# Patient Record
Sex: MALE | Race: BLACK OR AFRICAN AMERICAN | NOT HISPANIC OR LATINO | Employment: FULL TIME | URBAN - METROPOLITAN AREA
[De-identification: names, ages, dates, MRNs, and addresses within clinical notes are randomized per-mention and may not be internally consistent; named-entity substitution may affect disease eponyms.]

---

## 2017-04-06 ENCOUNTER — TELEPHONE (OUTPATIENT)
Dept: ORTHOPEDIC SURGERY | Facility: CLINIC | Age: 32
End: 2017-04-06

## 2017-04-06 NOTE — TELEPHONE ENCOUNTER
----- Message from Honey Hicks sent at 4/4/2017  4:53 PM CDT -----  Contact: self  Patient called regarding injury. Didn't get into the specifics but has nevriky been seen. Please contact [phone] (home)

## 2017-04-06 NOTE — TELEPHONE ENCOUNTER
Mr. Shelley was calling because he is concerned about his wife. She is crying in pain and he wanted to know if there is anything else Isabelle Tobin can do for her. I let him know other than the anti-inflammatory, muscle relaxer, physical therapy, appointment with Dr. Peacock and the surgeon she has nothing more to offer. The patient is going for her physical therapy evaluation today.

## 2017-05-16 ENCOUNTER — NURSE TRIAGE (OUTPATIENT)
Dept: ADMINISTRATIVE | Facility: CLINIC | Age: 32
End: 2017-05-16

## 2017-05-16 ENCOUNTER — TELEPHONE (OUTPATIENT)
Dept: FAMILY MEDICINE | Facility: CLINIC | Age: 32
End: 2017-05-16

## 2017-05-16 NOTE — TELEPHONE ENCOUNTER
----- Message from Cesia Mckay sent at 5/16/2017  1:32 PM CDT -----  Contact: Patient  Patient's right middle finger is very swollen. Patient cannot close finger. Needs to be seen today. Please call patient at [phone].

## 2017-05-16 NOTE — TELEPHONE ENCOUNTER
Tried to reach pt. No answer. No voicemail. Unable to leave msg. Will have to try later, or if pt calls back, will recommend UC clinic or offer first available when he calls back.

## 2017-05-16 NOTE — TELEPHONE ENCOUNTER
"    Reason for Disposition   [1] Looks infected (spreading redness, red streak, pus) AND [2] severe pain with movement    Answer Assessment - Initial Assessment Questions  1. ONSET: "When did the pain start?"       4-5 days.    2. LOCATION and RADIATION: "Where is the pain located?"  (e.g., fingertip, around nail, joint, entire   finger)       All around the nail is hurting, and the entire middle finger of my right hand is swollen.    3. SEVERITY: "How bad is the pain?" "What does it keep you from doing?"   (Scale 1-10; or mild, moderate, severe)   - MILD - doesn't interfere with normal activities    - MODERATE - interferes with normal activities or awakens from sleep   - SEVERE - excruciating pain, unable to hold a glass of water or bend finger even a little      I can't write with this hand.    4. APPEARANCE: "What does the finger look like?" (e.g., redness, swelling, bruising, pallor)      Red, swollen at least twice as large as the other hand's middle finger.    5. WORK OR EXERCISE: "Has there been any recent work or exercise that involved this part of the body?"        5. CAUSE: "What do you think is causing the pain?"      I think it is an ingrown nail.  Because I bite my nails.    6. AGGRAVATING FACTORS: "What makes the pain worse?" (e.g., using computer)      No, it is constant pain.  10 of 10 pain.    7. OTHER SYMPTOMS: "Do you have any other symptoms?" (e.g., fever, neck pain, numbness)      My finger is numb in certain areas, but no fever.    8. PREGNANCY: "Is there any chance you are pregnant?" "When was your last menstrual period?"      n/a    Protocols used:  FINGER PAIN-A-    Recommended ED now for Kurt, who called with red, swollen, and severely painful (10 of 10) middle finger of right hand.  He states has been getting worse over the last 4-5 days, and states it is probably due to an ingrown fingernail.  Finger is twice as large as left hand middle finger, and he states he can't hold a pen or " write with the right hand.  Message to Wili Molina MD .  Please contact caller directly with any additional care advice.

## 2017-05-17 NOTE — TELEPHONE ENCOUNTER
Pt spoke with Sole Elias RN yesterday regarding his swollen finger ans he advised pt to go to ED.

## 2017-05-18 ENCOUNTER — TELEPHONE (OUTPATIENT)
Dept: FAMILY MEDICINE | Facility: CLINIC | Age: 32
End: 2017-05-18

## 2017-05-18 NOTE — TELEPHONE ENCOUNTER
----- Message from Artie Donahue sent at 5/18/2017  3:47 PM CDT -----  Contact: self   Patient need orders for bloodwork, any questions please call back at, [phone] (home)

## 2017-05-18 NOTE — TELEPHONE ENCOUNTER
Spoke with pt and he is asking to get lab orders for his annual that he has scheduled 06/05/2017 with Dr. Molina.     Also pt is on the schedule for 05/19/2017 due to chest pains ( not active); he said that he does not c/o SOB or any left arm numbness that it comes and goes and he has been under some stress and seems to think it may be anxiety related.

## 2017-05-19 NOTE — TELEPHONE ENCOUNTER
Lm for pt to return call to inform him that labs will be ordered on his appt that is scheduled for 05/19/2017.

## 2017-05-19 NOTE — TELEPHONE ENCOUNTER
----- Message from Patrica Rocco sent at 5/19/2017  3:25 PM CDT -----  Returning your call.  Please call [phone].

## 2017-05-22 ENCOUNTER — PATIENT OUTREACH (OUTPATIENT)
Dept: ADMINISTRATIVE | Facility: HOSPITAL | Age: 32
End: 2017-05-22

## 2017-06-23 ENCOUNTER — NURSE TRIAGE (OUTPATIENT)
Dept: ADMINISTRATIVE | Facility: CLINIC | Age: 32
End: 2017-06-23

## 2017-06-23 ENCOUNTER — OFFICE VISIT (OUTPATIENT)
Dept: FAMILY MEDICINE | Facility: CLINIC | Age: 32
End: 2017-06-23
Payer: COMMERCIAL

## 2017-06-23 VITALS
TEMPERATURE: 98 F | HEIGHT: 74 IN | BODY MASS INDEX: 23.68 KG/M2 | DIASTOLIC BLOOD PRESSURE: 82 MMHG | WEIGHT: 184.5 LBS | SYSTOLIC BLOOD PRESSURE: 130 MMHG | HEART RATE: 64 BPM

## 2017-06-23 DIAGNOSIS — R07.9 CHEST PAIN, UNSPECIFIED TYPE: Primary | ICD-10-CM

## 2017-06-23 DIAGNOSIS — M51.37 DDD (DEGENERATIVE DISC DISEASE), LUMBOSACRAL: ICD-10-CM

## 2017-06-23 DIAGNOSIS — R20.2 TINGLING SENSATION: ICD-10-CM

## 2017-06-23 DIAGNOSIS — I24.9 ACS (ACUTE CORONARY SYNDROME): ICD-10-CM

## 2017-06-23 DIAGNOSIS — R94.31 ABNORMAL EKG: ICD-10-CM

## 2017-06-23 PROCEDURE — 99215 OFFICE O/P EST HI 40 MIN: CPT | Mod: S$GLB,,, | Performed by: INTERNAL MEDICINE

## 2017-06-23 PROCEDURE — 93005 ELECTROCARDIOGRAM TRACING: CPT | Mod: S$GLB,,, | Performed by: INTERNAL MEDICINE

## 2017-06-23 PROCEDURE — 99999 PR PBB SHADOW E&M-EST. PATIENT-LVL III: CPT | Mod: PBBFAC,,, | Performed by: INTERNAL MEDICINE

## 2017-06-23 PROCEDURE — 93010 ELECTROCARDIOGRAM REPORT: CPT | Mod: S$GLB,,, | Performed by: INTERNAL MEDICINE

## 2017-06-23 NOTE — TELEPHONE ENCOUNTER
Spoke with pt, he states that he has been having this and he doesn't feel he needs to go to the ED for this.  I did explain to him that if anything happens we are not responsible as he did not seek care in the ED when directed and he accepted full responsibility.  He was booked same day with [facility] at 340pm.

## 2017-06-23 NOTE — TELEPHONE ENCOUNTER
Reason for Disposition   Recent long-distance travel with prolonged time in car, bus, plane, or train (i.e., within past 2 weeks; 6 or more hours duration)    Protocols used: ST CHEST PAIN-A-[place]    Pt c/o left sided sharp, intermittent chest pain and left foot tingling. Care advice given. Pt does not want to go to the ER, states he wants appt with PCP. Please call for further care advice.

## 2017-06-23 NOTE — PROGRESS NOTES
Subjective:       Patient ID: Kurt Shelley is a 31 y.o. male.    Chief Complaint: Chest Pain    HPI  32 yo male seen for Dr Molina, for evaluation of L lower thoracic chest pain.  Had some chest pain earlier here in the office before being seen cleared by the time he was evaluated.  She was no known history of any heart problems has reportedly had chest pain involving left lower thoracic area which started about 4 weeks ago it waxes and wanes sore at times is unable to state how long it lasts occurs about 1-2 times a day.  He has no radiation of the chest pain.  No nausea or vomiting associated with it or shortness of breath or diaphoresis.  He does however get sweats at night occasionally which as been occurring for a few months now he denies any drug abuse caffeine intake is one cup of coffee or tea during the day.  In EKG at age 19 to join in [employer] was performed patient reportedly failed due to abnormal heart as he describes it.  [facility] due to his service excess stress test at about age 19 he reportedly did fine he suspects.  Chest pain occurs at rest and with actinic her at walking as well.  He works out regularly he's in good shape he lifts weights and runs 5 miles at a time about 2-3 times per week  Family medical history is negative for coronary disease his mom does have hypertension and has had a CVA.  His brother  of congestive heart failure at 23 he was obese with obstructive sleep apnea.  He also wants to have tingling in his left foot evaluated which he's had for about one week.  He doesn't smoke or have a history of elevated fasting blood sugar.  Has been constant mostly in character.  He has no lower back pain or gluteal pain.  He has a history of bulging disc involving his lower back.from  MRI was performed to lower [facility] roughly around [facility].  He's taken aleve on two occasions but not really sure if this has done anything. Extensive time spent discussing his  "complaints, and answering questions, along w counseling, and plan of care. Total time: 450 pm-600 pm.    Review of Systems   Constitutional: Negative for appetite change, fatigue, fever and unexpected weight change.   HENT: Negative for congestion, postnasal drip, rhinorrhea and sinus pressure.         Seasomnal w perennial allergies   Eyes: Negative for discharge and itching.   Respiratory: Negative for cough, chest tightness, shortness of breath and wheezing.    Cardiovascular: Positive for chest pain and palpitations. Negative for leg swelling.        Palpitations more like skipped beats   Gastrointestinal: Positive for constipation. Negative for abdominal pain, blood in stool, diarrhea, nausea and vomiting.        Heartburn last week; has cleared. Constipation for 2 weeks.    Endocrine: Negative for polydipsia, polyphagia and polyuria.   Genitourinary: Negative for dysuria, hematuria and urgency.   Musculoskeletal: Negative for arthralgias and myalgias.   Skin: Negative for rash.   Allergic/Immunologic: Positive for environmental allergies. Negative for food allergies and immunocompromised state.   Neurological: Negative for tremors, seizures and headaches.        Tingling in his feet.   Hematological: Negative for adenopathy. Does not bruise/bleed easily.   Psychiatric/Behavioral:        Denies anxiety or depression.       Objective:      Vitals:    06/23/17 1613   BP: 130/82   Pulse: 64   Temp: 98.2 °F (36.8 °C)   TempSrc: Oral   Weight: 83.7 kg (184 lb 8.4 oz)   Height: 6' 2" (1.88 m)     Body mass index is 23.69 kg/m².    Physical Exam   Constitutional: He is oriented to person, place, and time. He appears well-developed and well-nourished.   HENT:   Head: Normocephalic and atraumatic.   Eyes: EOM are normal.   Neck: Normal range of motion. Neck supple. No thyromegaly present.   No carotid bruits heard.   Cardiovascular: Normal rate, regular rhythm and normal heart sounds.  Exam reveals no gallop.    No " murmur heard.  Pulmonary/Chest: Effort normal and breath sounds normal. No respiratory distress. He has no wheezes. He has no rales.   Abdominal: Soft. Bowel sounds are normal. He exhibits no distension. There is no tenderness. There is no rebound and no guarding.   Musculoskeletal: Normal range of motion. He exhibits no edema.   Lymphadenopathy:     He has no cervical adenopathy.   Neurological: He is alert and oriented to person, place, and time.   Moves all 4 extremities fine. SLR neg; nl ROM hips; hips nontender. MS 5/5 LE's; patellar DTR's ; DP's 2+; no decreased sensation to touch in his feet.   Skin: No rash noted.   Psychiatric: He has a normal mood and affect. His behavior is normal. Thought content normal.   Vitals reviewed.      Assessment:       1. Chest pain, unspecified type    2. ACS (acute coronary syndrome)    3. Abnormal EKG    4. DDD (degenerative disc disease), lumbosacral    5. Tingling sensation        Plan:       Chest pain, unspecified type; patient given  mg by mouth in the office; he had no further chest pain in the office.  EKG shows normal sinus rhythm at 86 with a deep QS in V1 suspicious for septal infarct.  Also with evidence of left atrial hypertrophy and V1.  Fair amount of T-wave suppression and V3 worsens in V4 and then improves through V5 and V6 which can easily be reflective of anterior lateral ischemia also with ST depression in V4 and V5 and minimum and V6 patient also with mild ST depression inferiorly as well as T inversion with flattened to slightly inverted T waves laterally in I and aVL.  Changes in V4 of elevated R waves  may be due to early repolarization versus LVH w strain as well; r/o ischemia; patient to go to emergency room for further evaluation and treatment of chest pain with evidence of ischemic changes per EKG; suspect ACS w Abnormal EKG;     ACS suspected; needs to be r/o. Needs cardiac isoenzymes asap and ER evaluation.    History of recently elevated  blood pressure; anxiety likely has a significant role    DDD (degenerative disc disease), lumbosacral; his history of bulging disc in his lower back; now with intermittent tingling to his left foot    Tingling sensation to left foot; suspected due to bulging disc; patient needs MRI of lumbar sacral spine; he requests that it be performed in an open MRI; will need insurance authorization first.

## 2017-06-26 DIAGNOSIS — Z76.89 ENCOUNTER TO ESTABLISH CARE: Primary | ICD-10-CM

## 2017-06-27 ENCOUNTER — TELEPHONE (OUTPATIENT)
Dept: FAMILY MEDICINE | Facility: CLINIC | Age: 32
End: 2017-06-27

## 2017-06-27 NOTE — TELEPHONE ENCOUNTER
Spoke with Ana María , she just needs an order place to be attached to appt for billing. The EKG is was already done.

## 2017-06-27 NOTE — TELEPHONE ENCOUNTER
----- Message from Ana María Wang sent at 6/27/2017  8:59 AM CDT -----  Regarding: EKG ORDER  Please put in EKG order, thanks. Ana María [phone]

## 2017-06-28 DIAGNOSIS — R07.9 CHEST PAIN, UNSPECIFIED TYPE: Primary | ICD-10-CM

## 2017-06-29 ENCOUNTER — OFFICE VISIT (OUTPATIENT)
Dept: INTERNAL MEDICINE | Facility: CLINIC | Age: 32
End: 2017-06-29
Payer: COMMERCIAL

## 2017-06-29 ENCOUNTER — LAB VISIT (OUTPATIENT)
Dept: LAB | Facility: HOSPITAL | Age: 32
End: 2017-06-29
Attending: FAMILY MEDICINE
Payer: COMMERCIAL

## 2017-06-29 VITALS
SYSTOLIC BLOOD PRESSURE: 133 MMHG | HEIGHT: 74 IN | HEART RATE: 68 BPM | TEMPERATURE: 98 F | WEIGHT: 186.94 LBS | DIASTOLIC BLOOD PRESSURE: 84 MMHG | BODY MASS INDEX: 23.99 KG/M2

## 2017-06-29 DIAGNOSIS — Z00.00 ROUTINE GENERAL MEDICAL EXAMINATION AT A HEALTH CARE FACILITY: ICD-10-CM

## 2017-06-29 DIAGNOSIS — I10 ESSENTIAL HYPERTENSION: ICD-10-CM

## 2017-06-29 DIAGNOSIS — Z00.00 ROUTINE GENERAL MEDICAL EXAMINATION AT A HEALTH CARE FACILITY: Primary | ICD-10-CM

## 2017-06-29 LAB
ALBUMIN SERPL BCP-MCNC: 4.1 G/DL
ALP SERPL-CCNC: 60 U/L
ALT SERPL W/O P-5'-P-CCNC: 26 U/L
ANION GAP SERPL CALC-SCNC: 7 MMOL/L
AST SERPL-CCNC: 28 U/L
BASOPHILS # BLD AUTO: 0.03 K/UL
BASOPHILS NFR BLD: 1.1 %
BILIRUB SERPL-MCNC: 0.5 MG/DL
BUN SERPL-MCNC: 22 MG/DL
CALCIUM SERPL-MCNC: 9.4 MG/DL
CHLORIDE SERPL-SCNC: 105 MMOL/L
CHOLEST/HDLC SERPL: 3.2 {RATIO}
CO2 SERPL-SCNC: 29 MMOL/L
CREAT SERPL-MCNC: 1.4 MG/DL
DIFFERENTIAL METHOD: ABNORMAL
EOSINOPHIL # BLD AUTO: 0.1 K/UL
EOSINOPHIL NFR BLD: 3 %
ERYTHROCYTE [DISTWIDTH] IN BLOOD BY AUTOMATED COUNT: 13.4 %
EST. GFR  (AFRICAN AMERICAN): >60 ML/MIN/1.73 M^2
EST. GFR  (NON AFRICAN AMERICAN): >60 ML/MIN/1.73 M^2
GLUCOSE SERPL-MCNC: 100 MG/DL
HCT VFR BLD AUTO: 41.2 %
HDL/CHOLESTEROL RATIO: 31.1 %
HDLC SERPL-MCNC: 193 MG/DL
HDLC SERPL-MCNC: 60 MG/DL
HGB BLD-MCNC: 13.7 G/DL
LDLC SERPL CALC-MCNC: 125.4 MG/DL
LYMPHOCYTES # BLD AUTO: 1.6 K/UL
LYMPHOCYTES NFR BLD: 59.2 %
MCH RBC QN AUTO: 28.2 PG
MCHC RBC AUTO-ENTMCNC: 33.3 %
MCV RBC AUTO: 85 FL
MONOCYTES # BLD AUTO: 0.2 K/UL
MONOCYTES NFR BLD: 8.7 %
NEUTROPHILS # BLD AUTO: 0.7 K/UL
NEUTROPHILS NFR BLD: 28 %
NONHDLC SERPL-MCNC: 133 MG/DL
PLATELET # BLD AUTO: 207 K/UL
PMV BLD AUTO: 11.8 FL
POTASSIUM SERPL-SCNC: 4.6 MMOL/L
PROT SERPL-MCNC: 7.4 G/DL
RBC # BLD AUTO: 4.86 M/UL
SODIUM SERPL-SCNC: 141 MMOL/L
T4 FREE SERPL-MCNC: 1.01 NG/DL
TRIGL SERPL-MCNC: 38 MG/DL
TSH SERPL DL<=0.005 MIU/L-ACNC: 1.01 UIU/ML
WBC # BLD AUTO: 2.65 K/UL

## 2017-06-29 PROCEDURE — 84439 ASSAY OF FREE THYROXINE: CPT

## 2017-06-29 PROCEDURE — 84443 ASSAY THYROID STIM HORMONE: CPT

## 2017-06-29 PROCEDURE — 80053 COMPREHEN METABOLIC PANEL: CPT

## 2017-06-29 PROCEDURE — 36415 COLL VENOUS BLD VENIPUNCTURE: CPT | Mod: PO

## 2017-06-29 PROCEDURE — 99999 PR PBB SHADOW E&M-EST. PATIENT-LVL III: CPT | Mod: PBBFAC,,, | Performed by: FAMILY MEDICINE

## 2017-06-29 PROCEDURE — 85025 COMPLETE CBC W/AUTO DIFF WBC: CPT

## 2017-06-29 PROCEDURE — 99395 PREV VISIT EST AGE 18-39: CPT | Mod: S$GLB,,, | Performed by: FAMILY MEDICINE

## 2017-06-29 PROCEDURE — 80061 LIPID PANEL: CPT

## 2017-06-29 RX ORDER — AMLODIPINE BESYLATE 5 MG/1
5 TABLET ORAL DAILY
Qty: 30 TABLET | Refills: 5 | Status: SHIPPED | OUTPATIENT
Start: 2017-06-29 | End: 2017-06-29 | Stop reason: SDUPTHER

## 2017-06-29 RX ORDER — AMLODIPINE BESYLATE 5 MG/1
5 TABLET ORAL DAILY
Qty: 30 TABLET | Refills: 5 | Status: SHIPPED | OUTPATIENT
Start: 2017-06-29 | End: 2017-08-23 | Stop reason: ALTCHOICE

## 2017-06-29 NOTE — PROGRESS NOTES
"Subjective:   Patient ID: Kurt Shelley is a 31 y.o. male.    Chief Complaint: Annual Exam      HPI  32 yo male here with two children. Here for general physical exam.   Patient queried and denies any further complaints.    PREVENTIVE MED  Diet--fair  Exercise--fair  Colorectal Ca--NA for age.  Lung ca--NA  Alcohol use--modest  Tobacco--does not use  BP--cont to monitor  Depression--none  Type 2 DM--screen  Hep C--screen  Lipids--screen  HIV--not at risk  Obesity--cont to monitor  Prostate --na  STD--not at risk  TB--not at risk  Vision--check annually      ALLERGIES AND MEDICATIONS: updated and reviewed.  Review of patient's allergies indicates:   Allergen Reactions    No known drug allergies        Current Outpatient Prescriptions:     amlodipine (NORVASC) 5 MG tablet, Take 1 tablet (5 mg total) by mouth once daily., Disp: 30 tablet, Rfl: 5    aspirin (ECOTRIN) 325 MG EC tablet, Take 325 mg by mouth once daily., Disp: , Rfl:     Review of Systems   Constitutional: Negative for activity change, appetite change, chills, diaphoresis, fatigue, fever and unexpected weight change.   HENT: Negative for congestion, ear discharge, ear pain, postnasal drip, rhinorrhea, sneezing and sore throat.    Eyes: Negative for photophobia and discharge.   Respiratory: Negative for cough, chest tightness, shortness of breath and wheezing.    Cardiovascular: Negative for chest pain and palpitations.   Gastrointestinal: Negative for abdominal distention, abdominal pain, diarrhea, nausea and vomiting.   Genitourinary: Negative for dysuria.   Musculoskeletal: Negative for arthralgias and neck pain.   Skin: Negative for rash.   Neurological: Negative for headaches.       Objective:     Vitals:    06/29/17 0813   BP: 133/84   Pulse: 68   Temp: 97.7 °F (36.5 °C)   TempSrc: Oral   Weight: 84.8 kg (186 lb 15.2 oz)   Height: 6' 2" (1.88 m)   PainSc: 0-No pain     Body mass index is 24 kg/m².    Physical Exam   Constitutional: He appears " well-developed and well-nourished.   HENT:   Head: Normocephalic and atraumatic.   Right Ear: Hearing, tympanic membrane, external ear and ear canal normal. No drainage or swelling. No decreased hearing is noted.   Left Ear: Hearing, tympanic membrane, external ear and ear canal normal. No drainage or swelling. No decreased hearing is noted.   Nose: Nose normal. No rhinorrhea.   Mouth/Throat: Oropharynx is clear and moist. No oropharyngeal exudate, posterior oropharyngeal edema or posterior oropharyngeal erythema.   Eyes: Conjunctivae, EOM and lids are normal. Pupils are equal, round, and reactive to light. Right eye exhibits no discharge and no exudate. Left eye exhibits no discharge and no exudate. Right conjunctiva is not injected. Left conjunctiva is not injected.   Neck: Trachea normal and full passive range of motion without pain. Normal carotid pulses, no hepatojugular reflux and no JVD present. Carotid bruit is not present. No neck rigidity. No edema and no erythema present. No thyroid mass and no thyromegaly present.   Cardiovascular: Normal rate, regular rhythm and normal heart sounds.    Pulmonary/Chest: Effort normal. No respiratory distress.   Abdominal: Soft. Normal appearance and bowel sounds are normal. There is no tenderness. There is negative Holly's sign.   Lymphadenopathy:     He has no cervical adenopathy.   Neurological: He is alert.   Skin: Skin is warm and dry.   Psychiatric: He has a normal mood and affect. His speech is normal and behavior is normal.       Assessment and Plan:   Kurt Ponce was seen today for annual exam.    Diagnoses and all orders for this visit:    Routine general medical examination at a health care facility  -     CBC auto differential; Future  -     Comprehensive metabolic panel; Future  -     Lipid panel; Future  -     TSH; Future  -     T4, free; Future    Essential hypertension    -     amlodipine (NORVASC) 5 MG tablet; Take 1 tablet (5 mg total) by mouth once  daily.        Return in about 6 months (around 12/29/2017).    THIS NOTE WILL BE SHARED WITH THE PATIENT.

## 2017-07-05 ENCOUNTER — TELEPHONE (OUTPATIENT)
Dept: INTERNAL MEDICINE | Facility: CLINIC | Age: 32
End: 2017-07-05

## 2017-07-05 DIAGNOSIS — I10 ESSENTIAL HYPERTENSION: ICD-10-CM

## 2017-07-05 DIAGNOSIS — D72.819 LEUKOPENIA, UNSPECIFIED TYPE: Primary | ICD-10-CM

## 2017-07-05 NOTE — TELEPHONE ENCOUNTER
Repeat CBC orders put in. Please sign order. Pt has been informed via portal and an appt letter.Also 6 month recall put in for f/u.  Thanks Adrienne

## 2017-07-07 DIAGNOSIS — Z76.89 ENCOUNTER TO ESTABLISH CARE: Primary | ICD-10-CM

## 2017-07-10 ENCOUNTER — OFFICE VISIT (OUTPATIENT)
Dept: CARDIOLOGY | Facility: CLINIC | Age: 32
End: 2017-07-10
Payer: COMMERCIAL

## 2017-07-10 ENCOUNTER — PATIENT MESSAGE (OUTPATIENT)
Dept: CARDIOLOGY | Facility: CLINIC | Age: 32
End: 2017-07-10

## 2017-07-10 ENCOUNTER — HOSPITAL ENCOUNTER (OUTPATIENT)
Dept: CARDIOLOGY | Facility: CLINIC | Age: 32
Discharge: HOME | End: 2017-07-10
Payer: COMMERCIAL

## 2017-07-10 VITALS
SYSTOLIC BLOOD PRESSURE: 140 MMHG | HEIGHT: 74 IN | DIASTOLIC BLOOD PRESSURE: 84 MMHG | WEIGHT: 192.88 LBS | HEART RATE: 68 BPM | BODY MASS INDEX: 24.75 KG/M2

## 2017-07-10 DIAGNOSIS — R94.31 ABNORMAL ECG: ICD-10-CM

## 2017-07-10 DIAGNOSIS — Z76.89 ENCOUNTER TO ESTABLISH CARE: ICD-10-CM

## 2017-07-10 DIAGNOSIS — R07.9 CHEST PAIN OF UNKNOWN ETIOLOGY: ICD-10-CM

## 2017-07-10 PROCEDURE — 99204 OFFICE O/P NEW MOD 45 MIN: CPT | Mod: S$GLB,,, | Performed by: INTERNAL MEDICINE

## 2017-07-10 PROCEDURE — 93000 ELECTROCARDIOGRAM COMPLETE: CPT | Mod: S$GLB,,, | Performed by: INTERNAL MEDICINE

## 2017-07-10 PROCEDURE — 99999 PR PBB SHADOW E&M-EST. PATIENT-LVL III: CPT | Mod: PBBFAC,,, | Performed by: INTERNAL MEDICINE

## 2017-07-10 RX ORDER — HYDROCHLOROTHIAZIDE 25 MG/1
25 TABLET ORAL DAILY
Qty: 30 TABLET | Refills: 11 | Status: SHIPPED | OUTPATIENT
Start: 2017-07-10 | End: 2018-05-02 | Stop reason: ALTCHOICE

## 2017-07-10 NOTE — Clinical Note
Thank you for referring Kurt Shelley for evaluation of hypertension and an abnormal electrocardiogram. Please see my note for details of this encounter. If you have any questions, please contact me.  Thank you again for the referral.

## 2017-07-10 NOTE — PROGRESS NOTES
Chart has been dictated using voice recognition software.  It is not been reviewed carefully for any transcriptional errors due to this technology.   Subjective:   Patient ID:  Kurt Shelley is a 31 y.o. male who presents for evaluation of Chest Pain and Palpitations      HPI: Patient with history of hypertension who was seen in the emergency department on  for chest pain after seeing PCP presents for further evaluation.  Evaluation showed normal cardiac enzymes with an ECG that showed significant left ventricular hypertrophy with associated ST-T wave changes.    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  Has never been treated up until a couple weeks. Patient has pain near left breast lasting seconds that would occur spontaneously. No chest pain when running 5 mi x3/week.  Does circuit weight training without chest pain.  Patient denies any dyspnea at rest or on exertion, orthopnea, PND, or edema.      Patient gets skipped beat feeling very erratically. Patient denies any lightheadedness or syncope. Had near syncope once after standing following significant activity. No history of heart murmur.    Brother  at age 23 from heart failure (overweight with sleep apnea).      Cardiac risk factors: hypertension    Past Medical History:   Diagnosis Date    Allergy     Asthma        No past surgical history on file.    Social History   Substance Use Topics    Smoking status: Never Smoker    Smokeless tobacco: Not on file    Alcohol use 2.4 oz/week     1 Standard drinks or equivalent, 3 Shots of liquor per week       Outpatient Medications Prior to Visit   Medication Sig Dispense Refill    amlodipine (NORVASC) 5 MG tablet Take 1 tablet (5 mg total) by mouth once daily. 30 tablet 5    aspirin (ECOTRIN) 325 MG EC tablet Take 325 mg by mouth once daily.       No facility-administered medications prior to visit.        Review of patient's allergies indicates:   Allergen Reactions    No  "known drug allergies        Review of Systems   Constitution: Negative for weight gain and weight loss.   HENT: Negative for nosebleeds.    Eyes: Negative for vision loss in left eye and vision loss in right eye.   Cardiovascular: Negative for claudication.        As above   Respiratory: Negative for hemoptysis, shortness of breath, snoring, sputum production and wheezing.    Endocrine: Negative for polydipsia and polyuria.   Hematologic/Lymphatic: Does not bruise/bleed easily.   Musculoskeletal: Negative for myalgias.   Gastrointestinal: Positive for constipation, hematochezia and hemorrhoids. Negative for change in bowel habit, hematemesis, melena, nausea and vomiting.   Genitourinary: Negative for hematuria.   Neurological: Negative for focal weakness and numbness.     Objective:   Physical Exam   Constitutional: He is oriented to person, place, and time. He appears well-developed and well-nourished.   BP (!) 140/84   Pulse 68   Ht 6' 2" (1.88 m)   Wt 87.5 kg (192 lb 14.4 oz)   BMI 24.77 kg/m²    Eyes: Pupils are equal, round, and reactive to light.   Neck: Neck supple. No JVD present. Carotid bruit is not present. No thyromegaly present.   Cardiovascular: Normal rate, regular rhythm and intact distal pulses.   No extrasystoles are present. PMI is not displaced.  Exam reveals gallop and S4. Exam reveals no friction rub.    Murmur heard.   Scratchy midsystolic murmur is present with a grade of 2/6  at the lower left sternal border  Pulmonary/Chest: Effort normal and breath sounds normal. He has no wheezes. He has no rales.   Abdominal: Soft. Bowel sounds are normal. There is no hepatosplenomegaly. There is no tenderness.   Musculoskeletal: He exhibits no edema.   Neurological: He is alert and oriented to person, place, and time. He has normal strength.   Skin: No cyanosis. Nails show no clubbing.       Lab Results   Component Value Date    WBC 2.65 (L) 06/29/2017    HGB 13.7 (L) 06/29/2017    HCT 41.2 " 06/29/2017    MCV 85 06/29/2017     06/29/2017       Lab Results   Component Value Date     06/29/2017    K 4.6 06/29/2017    BUN 22 (H) 06/29/2017    CREATININE 1.4 06/29/2017     06/29/2017    CHOL 193 06/29/2017    HDL 60 06/29/2017    LDLCALC 125.4 06/29/2017    TRIG 38 06/29/2017    CHOLHDL 31.1 06/29/2017    HGB 13.7 (L) 06/29/2017    HCT 41.2 06/29/2017     06/29/2017     Normal sinus rhythm with left ventricular hypertrophy with associated ST-T wave changes.  There is no change from an ECG dating back to 11-Jan-2010.  Assessment:     1. Abnormal ECG    2. Chest pain of unknown etiology      The patient has an abnormal ECG suggestive left ventricular hypertrophy with associated ST-T wave changes.  The patient is asymptomatic for any evidence of heart failure.  His chest pain is not compatible with coronary artery disease.  At this point, an echocardiogram should be obtained to determine whether there is any evidence of left ventricular hypertrophy and to evaluate his left ventricular function.  The patient's hypertension needs to be well treated as he showing signs of renal dysfunction and LVH on his ECG.  Therefore, HCTZ 25 mg daily will be started.  The patient will be reevaluated in 6 weeks as well as having his electrolytes checked at that time.  Plan:     Kurt Ponce was seen today for chest pain and palpitations.    Diagnoses and all orders for this visit:    Abnormal ECG  -     2D echo with color flow doppler; Future  -     Basic metabolic panel; Future; Expected date: 08/21/2017    Chest pain of unknown etiology    Other orders  -     hydrochlorothiazide (HYDRODIURIL) 25 MG tablet; Take 1 tablet (25 mg total) by mouth once daily.

## 2017-07-11 ENCOUNTER — PATIENT MESSAGE (OUTPATIENT)
Dept: INTERNAL MEDICINE | Facility: CLINIC | Age: 32
End: 2017-07-11

## 2017-07-13 ENCOUNTER — HOSPITAL ENCOUNTER (OUTPATIENT)
Dept: CARDIOLOGY | Facility: CLINIC | Age: 32
Discharge: HOME | End: 2017-07-13
Payer: COMMERCIAL

## 2017-07-13 DIAGNOSIS — R94.31 ABNORMAL ECG: ICD-10-CM

## 2017-07-13 LAB
DIASTOLIC DYSFUNCTION: NO
ESTIMATED PA SYSTOLIC PRESSURE: 29.63
MITRAL VALVE REGURGITATION: NORMAL
RETIRED EF AND QEF - SEE NOTES: 68 (ref 55–65)
TRICUSPID VALVE REGURGITATION: NORMAL

## 2017-07-13 PROCEDURE — 93306 TTE W/DOPPLER COMPLETE: CPT | Mod: S$GLB,,, | Performed by: INTERNAL MEDICINE

## 2017-07-19 ENCOUNTER — LAB VISIT (OUTPATIENT)
Dept: LAB | Facility: HOSPITAL | Age: 32
End: 2017-07-19
Attending: FAMILY MEDICINE
Payer: COMMERCIAL

## 2017-07-19 DIAGNOSIS — I10 ESSENTIAL HYPERTENSION: ICD-10-CM

## 2017-07-19 DIAGNOSIS — D72.819 LEUKOPENIA, UNSPECIFIED TYPE: ICD-10-CM

## 2017-07-19 LAB
BASOPHILS # BLD AUTO: 0.04 K/UL
BASOPHILS NFR BLD: 1.1 %
DIFFERENTIAL METHOD: ABNORMAL
EOSINOPHIL # BLD AUTO: 0.1 K/UL
EOSINOPHIL NFR BLD: 2.6 %
ERYTHROCYTE [DISTWIDTH] IN BLOOD BY AUTOMATED COUNT: 13.3 %
HCT VFR BLD AUTO: 39.2 %
HGB BLD-MCNC: 13.3 G/DL
LYMPHOCYTES # BLD AUTO: 1.8 K/UL
LYMPHOCYTES NFR BLD: 51.6 %
MCH RBC QN AUTO: 28.5 PG
MCHC RBC AUTO-ENTMCNC: 33.9 G/DL
MCV RBC AUTO: 84 FL
MONOCYTES # BLD AUTO: 0.4 K/UL
MONOCYTES NFR BLD: 10.9 %
NEUTROPHILS # BLD AUTO: 1.2 K/UL
NEUTROPHILS NFR BLD: 33.5 %
PLATELET # BLD AUTO: 211 K/UL
PMV BLD AUTO: 10.6 FL
RBC # BLD AUTO: 4.66 M/UL
WBC # BLD AUTO: 3.49 K/UL

## 2017-07-19 PROCEDURE — 85025 COMPLETE CBC W/AUTO DIFF WBC: CPT

## 2017-07-19 PROCEDURE — 36415 COLL VENOUS BLD VENIPUNCTURE: CPT | Mod: PO

## 2017-07-21 ENCOUNTER — PATIENT MESSAGE (OUTPATIENT)
Dept: INTERNAL MEDICINE | Facility: CLINIC | Age: 32
End: 2017-07-21

## 2017-07-21 NOTE — TELEPHONE ENCOUNTER
Dr Dodson, please put in referral for hematology, and blood work you want to order for pt.  Also, see pt ORALIA messqge from today  Thanks Adrienne

## 2017-07-24 DIAGNOSIS — D70.9 NEUTROPENIA, UNSPECIFIED TYPE: Primary | ICD-10-CM

## 2017-07-24 DIAGNOSIS — D64.9 ANEMIA, UNSPECIFIED TYPE: ICD-10-CM

## 2017-07-25 ENCOUNTER — PATIENT MESSAGE (OUTPATIENT)
Dept: CARDIOLOGY | Facility: CLINIC | Age: 32
End: 2017-07-25

## 2017-07-25 ENCOUNTER — TELEPHONE (OUTPATIENT)
Dept: CARDIOLOGY | Facility: CLINIC | Age: 32
End: 2017-07-25

## 2017-07-25 DIAGNOSIS — I42.2 HYPERTROPHIC CARDIOMYOPATHY: ICD-10-CM

## 2017-07-25 NOTE — TELEPHONE ENCOUNTER
----- Message from Ton Flores MD sent at 7/25/2017  8:49 AM CDT -----  I have ordered a cardiac MRI for this patient.  Please work with him to get it scheduled and completed by time he returns in the end of August. Thank you

## 2017-07-27 ENCOUNTER — TELEPHONE (OUTPATIENT)
Dept: HEMATOLOGY/ONCOLOGY | Facility: CLINIC | Age: 32
End: 2017-07-27

## 2017-07-27 NOTE — TELEPHONE ENCOUNTER
Spoke with patient to schedule referral.  Pt requested 8/3  1pm  With Dr Kenny.    ----- Message from Rommel Gil RN sent at 7/27/2017  2:14 PM CDT -----  Contact: [phone]      ----- Message -----  From: Pura Li  Sent: 7/24/2017  11:37 AM  To: Rommel Gil RN    Hi, Pt being referred to Hematology. Orders are entered. Can you please call to discuss?    Thanks!

## 2017-07-31 ENCOUNTER — HOSPITAL ENCOUNTER (OUTPATIENT)
Dept: RADIOLOGY | Facility: HOSPITAL | Age: 32
Discharge: HOME | End: 2017-07-31
Attending: INTERNAL MEDICINE
Payer: COMMERCIAL

## 2017-07-31 DIAGNOSIS — I42.2 HYPERTROPHIC CARDIOMYOPATHY: ICD-10-CM

## 2017-07-31 PROCEDURE — A9585 GADOBUTROL INJECTION: HCPCS | Performed by: INTERNAL MEDICINE

## 2017-07-31 PROCEDURE — 75561 CARDIAC MRI FOR MORPH W/DYE: CPT | Mod: 26,,, | Performed by: RADIOLOGY

## 2017-07-31 PROCEDURE — 75561 CARDIAC MRI FOR MORPH W/DYE: CPT | Mod: TC

## 2017-07-31 PROCEDURE — 25500020 PHARM REV CODE 255: Performed by: INTERNAL MEDICINE

## 2017-07-31 RX ORDER — GADOBUTROL 604.72 MG/ML
20 INJECTION INTRAVENOUS
Status: COMPLETED | OUTPATIENT
Start: 2017-07-31 | End: 2017-07-31

## 2017-07-31 RX ADMIN — GADOBUTROL 20 ML: 604.72 INJECTION INTRAVENOUS at 02:07

## 2017-08-03 ENCOUNTER — TELEPHONE (OUTPATIENT)
Dept: HEMATOLOGY/ONCOLOGY | Facility: CLINIC | Age: 32
End: 2017-08-03

## 2017-08-03 ENCOUNTER — TELEPHONE (OUTPATIENT)
Dept: CARDIOLOGY | Facility: HOSPITAL | Age: 32
End: 2017-08-03

## 2017-08-03 NOTE — TELEPHONE ENCOUNTER
----- Message from Jayson Garza sent at 8/3/2017 12:15 PM CDT -----  Contact: PT   PT would like to know if he is able to come next week for an appt? If not, he will keep his appt for today and be here for 1pm.    Please do not cancel appt today, if there are no available appts next week. Please call patient regarding this matter as soon as possible.     Call [phone]  Spoke with pt and appt has been rescheduled to 08/11/17 @ 340pm per pt's request

## 2017-08-03 NOTE — TELEPHONE ENCOUNTER
----- Message from Cecile Guillermo sent at 8/3/2017 12:56 PM CDT -----  Contact: pt can be reached at [phone]  Pt is calling to cancel his appt due to work schedule.  Please contact pt to reschedule appt.      Thank you!  Spoke with pt and appt has been rescheduled to 08/11/17 @ 340pm per pt's request.  Nelda

## 2017-08-08 ENCOUNTER — PATIENT MESSAGE (OUTPATIENT)
Dept: CARDIOLOGY | Facility: CLINIC | Age: 32
End: 2017-08-08

## 2017-08-09 ENCOUNTER — TELEPHONE (OUTPATIENT)
Dept: CARDIOLOGY | Facility: CLINIC | Age: 32
End: 2017-08-09

## 2017-08-10 ENCOUNTER — TELEPHONE (OUTPATIENT)
Dept: GENETICS | Facility: CLINIC | Age: 32
End: 2017-08-10

## 2017-08-10 NOTE — TELEPHONE ENCOUNTER
----- Message from Tamara Freeman MA sent at 8/10/2017  4:39 PM CDT -----  Thank you  ----- Message -----  From: Manuel Cabrales MA  Sent: 8/10/2017   4:29 PM  To: Tamara Freeman MA    Ok, I've scheduled it for 11/14 at 9am.   ----- Message -----  From: Tamara Freeman MA  Sent: 8/10/2017   2:59 PM  To: Manuel Cabrales MA    I did talk with Dr Flores there really isn't an urgency but he wanted something before December.  Sorry just give us the first available.  ----- Message -----  From: Manuel Cabrales MA  Sent: 8/10/2017   1:36 PM  To: Tamara Freeman MA    What's the urgency? Is there a reason it needs to be before next available?  ----- Message -----  From: Tamara Freeman MA  Sent: 8/10/2017   8:28 AM  To: Yoselin CHAVEZ Staff    Dr Flores would like this patient to be seen to be screened for hypertrophic cardiomyopathy.  Is there anyway to be seen before December Thanks

## 2017-08-11 ENCOUNTER — LAB VISIT (OUTPATIENT)
Dept: LAB | Facility: HOSPITAL | Age: 32
End: 2017-08-11
Payer: COMMERCIAL

## 2017-08-11 ENCOUNTER — INITIAL CONSULT (OUTPATIENT)
Dept: HEMATOLOGY/ONCOLOGY | Facility: CLINIC | Age: 32
End: 2017-08-11
Payer: COMMERCIAL

## 2017-08-11 VITALS
RESPIRATION RATE: 18 BRPM | BODY MASS INDEX: 23.82 KG/M2 | HEART RATE: 101 BPM | DIASTOLIC BLOOD PRESSURE: 90 MMHG | OXYGEN SATURATION: 99 % | HEIGHT: 74 IN | SYSTOLIC BLOOD PRESSURE: 161 MMHG | TEMPERATURE: 98 F | WEIGHT: 185.63 LBS

## 2017-08-11 DIAGNOSIS — D72.819 LEUKOPENIA, UNSPECIFIED TYPE: ICD-10-CM

## 2017-08-11 DIAGNOSIS — D64.9 ANEMIA, UNSPECIFIED TYPE: ICD-10-CM

## 2017-08-11 DIAGNOSIS — D72.819 LEUKOPENIA, UNSPECIFIED TYPE: Primary | ICD-10-CM

## 2017-08-11 LAB
ALBUMIN SERPL BCP-MCNC: 4.5 G/DL
ALP SERPL-CCNC: 59 U/L
ALT SERPL W/O P-5'-P-CCNC: 22 U/L
ANION GAP SERPL CALC-SCNC: 12 MMOL/L
AST SERPL-CCNC: 27 U/L
BASOPHILS # BLD AUTO: 0.03 K/UL
BASOPHILS NFR BLD: 0.4 %
BILIRUB SERPL-MCNC: 0.9 MG/DL
BUN SERPL-MCNC: 36 MG/DL
CALCIUM SERPL-MCNC: 10 MG/DL
CHLORIDE SERPL-SCNC: 101 MMOL/L
CO2 SERPL-SCNC: 26 MMOL/L
CREAT SERPL-MCNC: 1.5 MG/DL
DIFFERENTIAL METHOD: ABNORMAL
EOSINOPHIL # BLD AUTO: 0 K/UL
EOSINOPHIL NFR BLD: 0.4 %
ERYTHROCYTE [DISTWIDTH] IN BLOOD BY AUTOMATED COUNT: 13.2 %
EST. GFR  (AFRICAN AMERICAN): >60 ML/MIN/1.73 M^2
EST. GFR  (NON AFRICAN AMERICAN): >60 ML/MIN/1.73 M^2
FOLATE SERPL-MCNC: 12.8 NG/ML
GLUCOSE SERPL-MCNC: 93 MG/DL
HCT VFR BLD AUTO: 39.5 %
HGB BLD-MCNC: 13.8 G/DL
LYMPHOCYTES # BLD AUTO: 1.6 K/UL
LYMPHOCYTES NFR BLD: 21.5 %
MCH RBC QN AUTO: 28.6 PG
MCHC RBC AUTO-ENTMCNC: 34.9 G/DL
MCV RBC AUTO: 82 FL
MONOCYTES # BLD AUTO: 0.4 K/UL
MONOCYTES NFR BLD: 5.2 %
NEUTROPHILS # BLD AUTO: 5.4 K/UL
NEUTROPHILS NFR BLD: 72.1 %
PLATELET # BLD AUTO: 191 K/UL
PMV BLD AUTO: 9.6 FL
POTASSIUM SERPL-SCNC: 3.7 MMOL/L
PROT SERPL-MCNC: 7.9 G/DL
RBC # BLD AUTO: 4.82 M/UL
SODIUM SERPL-SCNC: 139 MMOL/L
VIT B12 SERPL-MCNC: 526 PG/ML
WBC # BLD AUTO: 7.54 K/UL

## 2017-08-11 PROCEDURE — 87340 HEPATITIS B SURFACE AG IA: CPT

## 2017-08-11 PROCEDURE — 85060 BLOOD SMEAR INTERPRETATION: CPT | Mod: ,,, | Performed by: PATHOLOGY

## 2017-08-11 PROCEDURE — 99999 PR PBB SHADOW E&M-EST. PATIENT-LVL III: CPT | Mod: PBBFAC,,, | Performed by: INTERNAL MEDICINE

## 2017-08-11 PROCEDURE — 86803 HEPATITIS C AB TEST: CPT

## 2017-08-11 PROCEDURE — 82607 VITAMIN B-12: CPT

## 2017-08-11 PROCEDURE — 86704 HEP B CORE ANTIBODY TOTAL: CPT

## 2017-08-11 PROCEDURE — 80053 COMPREHEN METABOLIC PANEL: CPT

## 2017-08-11 PROCEDURE — 82525 ASSAY OF COPPER: CPT

## 2017-08-11 PROCEDURE — 3080F DIAST BP >= 90 MM HG: CPT | Mod: S$GLB,,, | Performed by: INTERNAL MEDICINE

## 2017-08-11 PROCEDURE — 99204 OFFICE O/P NEW MOD 45 MIN: CPT | Mod: S$GLB,,, | Performed by: INTERNAL MEDICINE

## 2017-08-11 PROCEDURE — 86706 HEP B SURFACE ANTIBODY: CPT

## 2017-08-11 PROCEDURE — 86703 HIV-1/HIV-2 1 RESULT ANTBDY: CPT

## 2017-08-11 PROCEDURE — 82746 ASSAY OF FOLIC ACID SERUM: CPT

## 2017-08-11 PROCEDURE — 3008F BODY MASS INDEX DOCD: CPT | Mod: S$GLB,,, | Performed by: INTERNAL MEDICINE

## 2017-08-11 PROCEDURE — 3077F SYST BP >= 140 MM HG: CPT | Mod: S$GLB,,, | Performed by: INTERNAL MEDICINE

## 2017-08-11 PROCEDURE — 85025 COMPLETE CBC W/AUTO DIFF WBC: CPT

## 2017-08-11 NOTE — PROGRESS NOTES
SECTION OF HEMATOLOGY AND BONE MARROW TRANSPLANT  New Patient Visit   08/14/2017  Referred by:  Dr. Joshua Dodson  Referred for: anemia, leukopenia    CHIEF COMPLAINT:   Chief Complaint   Patient presents with    leukopenia       HISTORY OF PRESENT ILLNESS:   Healthy 32 yo referred for anemia/leukopenia, mild, asymptomatic noted on 2 cbc's this summer. No acute issues at time of cbc.  Being worked up for congential LVH right now.  Denies fever, chills, nightsweats, bleeding, brusing, lymphadenopathy, signs/symptoms of splenomegaly.    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  Comes to clinic alone.   PAST MEDICAL HISTORY:   Past Medical History:   Diagnosis Date    Allergy     Asthma        PAST SURGICAL HISTORY:   No past surgical history on file.    PAST SOCIAL HISTORY:   reports that he has never smoked. He has never used smokeless tobacco. He reports that he drinks about 2.4 oz of alcohol per week . He reports that he does not use drugs.    FAMILY HISTORY:  Family History   Problem Relation Age of Onset    Diabetes Mother     Hypertension Mother     Early death Brother 23     chf, Sleep apnea    Cancer Paternal Grandfather 78     colon ca and prostate ca       CURRENT MEDICATIONS:   Current Outpatient Prescriptions   Medication Sig    amlodipine (NORVASC) 5 MG tablet Take 1 tablet (5 mg total) by mouth once daily.    hydrochlorothiazide (HYDRODIURIL) 25 MG tablet Take 1 tablet (25 mg total) by mouth once daily.     No current facility-administered medications for this visit.      ALLERGIES:   Review of patient's allergies indicates:   Allergen Reactions    No known drug allergies              REVIEW OF SYSTEMS:   General ROS: negative  Psychological ROS: negative  Ophthalmic ROS: negative  ENT ROS: negative  Allergy and Immunology ROS: negative  Hematological and Lymphatic ROS: negative  Endocrine ROS: negative  Respiratory ROS: negative  Cardiovascular ROS: negative  Gastrointestinal ROS:  negative  Genito-Urinary ROS: negative  Musculoskeletal ROS: negative  Neurological ROS: negative  Dermatological ROS: negative    PHYSICAL EXAM:   Vitals:    08/11/17 1558   BP: (!) 161/90   Pulse: 101   Resp: 18   Temp: 98.1 °F (36.7 °C)       General - well developed, well nourished, no apparent distress  Head & Face - no sinus tenderness  Eyes - normal conjunctivae and lids   ENT - normal external auditory canals and tympanic membranes bilaterally oropharynx clear,  Normal dentition and gums  Neck - normal thyroid  Chest and Lung - normal respiratory effort, clear to auscultation bilaterally   Cardiovascular - RRR with no MGR, normal S1 and S2; no pedal edema  Abdomen -  soft, nontender, no palpable hepatomegaly or splenomegaly  Lymph - no palpable lymphadenopathy  Extremities - unremarkable nails and digits  Heme - no bruising, petechiae, pallor  Skin - no rashes or lesions  Psych - appropriate mood and affect      ECOG Performance Status: (foot note - ECOG PS provided by Eastern Cooperative Oncology Group) 0 - Asymptomatic    Karnofsky Performance Score:  100%- Normal, No Complaints, No Evidence of Disease  DATA:   Results for DANIEL JUAREZ (MRN [id number]) as of 8/14/2017 07:11   Ref. Range 8/11/2017 16:50   WBC Latest Ref Range: 3.90 - 12.70 K/uL 7.54   RBC Latest Ref Range: 4.60 - 6.20 M/uL 4.82   Hemoglobin Latest Ref Range: 14.0 - 18.0 g/dL 13.8 (L)   Hematocrit Latest Ref Range: 40.0 - 54.0 % 39.5 (L)   MCV Latest Ref Range: 82 - 98 fL 82   MCH Latest Ref Range: 27.0 - 31.0 pg 28.6   MCHC Latest Ref Range: 32.0 - 36.0 g/dL 34.9   RDW Latest Ref Range: 11.5 - 14.5 % 13.2   Platelets Latest Ref Range: 150 - 350 K/uL 191   MPV Latest Ref Range: 9.2 - 12.9 fL 9.6   Gran% Latest Ref Range: 38.0 - 73.0 % 72.1   Gran # Latest Ref Range: 1.8 - 7.7 K/uL 5.4   Lymph% Latest Ref Range: 18.0 - 48.0 % 21.5   Lymph # Latest Ref Range: 1.0 - 4.8 K/uL 1.6   Mono% Latest Ref Range: 4.0 - 15.0 % 5.2   Mono # Latest Ref  Range: 0.3 - 1.0 K/uL 0.4   Eosinophil% Latest Ref Range: 0.0 - 8.0 % 0.4   Eos # Latest Ref Range: 0.0 - 0.5 K/uL 0.0   Basophil% Latest Ref Range: 0.0 - 1.9 % 0.4   Baso # Latest Ref Range: 0.00 - 0.20 K/uL 0.03   Folate Latest Ref Range: 4.0 - 24.0 ng/mL 12.8   Vitamin B-12 Latest Ref Range: 210 - 950 pg/mL 526   Sodium Latest Ref Range: 136 - 145 mmol/L 139   Potassium Latest Ref Range: 3.5 - 5.1 mmol/L 3.7   Chloride Latest Ref Range: 95 - 110 mmol/L 101   CO2 Latest Ref Range: 23 - 29 mmol/L 26   Anion Gap Latest Ref Range: 8 - 16 mmol/L 12   BUN, Bld Latest Ref Range: 6 - 20 mg/dL 36 (H)   Creatinine Latest Ref Range: 0.5 - 1.4 mg/dL 1.5 (H)   eGFR if non African American Latest Ref Range: >60 mL/min/1.73 m^2 >60.0   eGFR if African American Latest Ref Range: >60 mL/min/1.73 m^2 >60.0   Glucose Latest Ref Range: 70 - 110 mg/dL 93   Calcium Latest Ref Range: 8.7 - 10.5 mg/dL 10.0   Alkaline Phosphatase Latest Ref Range: 55 - 135 U/L 59   Total Protein Latest Ref Range: 6.0 - 8.4 g/dL 7.9   Albumin Latest Ref Range: 3.5 - 5.2 g/dL 4.5   Total Bilirubin Latest Ref Range: 0.1 - 1.0 mg/dL 0.9   AST Latest Ref Range: 10 - 40 U/L 27   ALT Latest Ref Range: 10 - 44 U/L 22   Differential Method Unknown Automated   Pathologist Review Unknown Review required     ASSESSMENT AND PLAN:   Encounter Diagnoses   Name Primary?    Leukopenia, unspecified type Yes    Anemia, unspecified type      -negligible anemia; normal by most lab ranges  -no concerning symptoms or physical exam findings  -leukopenia/neutropenia present on previous cbc's now resolved on today's labs as above; suspect physiologic; have sent peripheral smear and reflexive flow cytometry to look for abnormal forms but low clinical index of suspicion for primary   Bone marrow process  -also b12, folate normal; copper pending for rarer nutritional causes of neutropenia  -hiv, hep panel pending but no risk factors  -assuming all testing as above normal; he does  not need fu with hematology; recommend pcp check cbc at least annually and he can be referred back if any new changes or concerning symptoms develop    Follow Up: as above  Rafiq Kenny MD  Hematology/Oncology/Bone Marrow Transplant

## 2017-08-11 NOTE — LETTER
August 14, 2017      Joshua Dodson MD  [address] [facility]  6th [address]  [place] [address]           [facility]  [address]  [place] [address]  Phone: [phone]          Patient: Kurt Shelley   MR Number: [id number]   YOB: 1985   Date of Visit: 8/11/2017       Dear Dr. Joshua Dodson:    Thank you for referring Kurt Shelley to me for evaluation. Attached you will find relevant portions of my assessment and plan of care.    If you have questions, please do not hesitate to call me. I look forward to following Kurt Shelley along with you.    Sincerely,        Enclosure  CC:  No Recipients    If you would like to receive this communication electronically, please contact [email or web address] or [phone] to request more information on [employer] Link access.    For providers and/or their staff who would like to refer a patient to [facility], please contact us through our one-stop-shop provider referral line, [facility], at [phone].    If you feel you have received this communication in error or would no longer like to receive these types of communications, please e-mail [email or web address]

## 2017-08-14 ENCOUNTER — PATIENT MESSAGE (OUTPATIENT)
Dept: HEMATOLOGY/ONCOLOGY | Facility: CLINIC | Age: 32
End: 2017-08-14

## 2017-08-14 LAB
HBV CORE AB SERPL QL IA: NEGATIVE
HBV SURFACE AB SER-ACNC: POSITIVE M[IU]/ML
HBV SURFACE AG SERPL QL IA: NEGATIVE
HCV AB SERPL QL IA: NEGATIVE
HIV 1+2 AB+HIV1 P24 AG SERPL QL IA: NEGATIVE
PATH REV BLD -IMP: NORMAL
PATH REV BLD -IMP: NORMAL

## 2017-08-15 LAB — COPPER SERPL-MCNC: 919 UG/L (ref 665–1480)

## 2017-08-21 ENCOUNTER — TELEPHONE (OUTPATIENT)
Dept: GENETICS | Facility: CLINIC | Age: 32
End: 2017-08-21

## 2017-08-21 NOTE — TELEPHONE ENCOUNTER
----- Message from Maritza Sanchez sent at 8/21/2017  9:49 AM CDT -----  Contact: Pt Kurt Shelley [phone]  Pt is requesting a call back from the nurse as he is trying to get a sooner appt.  Pt is already on the wait list but will be back in town late Tuesday night.    Pt may be reached at [phone].    Thank you.  LC

## 2017-08-23 ENCOUNTER — OFFICE VISIT (OUTPATIENT)
Dept: CARDIOLOGY | Facility: CLINIC | Age: 32
End: 2017-08-23
Payer: COMMERCIAL

## 2017-08-23 ENCOUNTER — LAB VISIT (OUTPATIENT)
Dept: LAB | Facility: HOSPITAL | Age: 32
End: 2017-08-23
Attending: INTERNAL MEDICINE
Payer: COMMERCIAL

## 2017-08-23 VITALS
BODY MASS INDEX: 25.29 KG/M2 | DIASTOLIC BLOOD PRESSURE: 79 MMHG | WEIGHT: 197.06 LBS | OXYGEN SATURATION: 98 % | SYSTOLIC BLOOD PRESSURE: 131 MMHG | HEIGHT: 74 IN | HEART RATE: 72 BPM

## 2017-08-23 DIAGNOSIS — Z82.49 FAMILY HISTORY OF CHF (CONGESTIVE HEART FAILURE): ICD-10-CM

## 2017-08-23 DIAGNOSIS — R94.31 ABNORMAL ECG: ICD-10-CM

## 2017-08-23 DIAGNOSIS — I10 ESSENTIAL HYPERTENSION: Chronic | ICD-10-CM

## 2017-08-23 DIAGNOSIS — I42.2 HYPERTROPHIC CARDIOMYOPATHY: Primary | Chronic | ICD-10-CM

## 2017-08-23 PROBLEM — Z82.41 FAMILY HISTORY OF SUDDEN CARDIAC DEATH IN BROTHER: Status: ACTIVE | Noted: 2017-08-23

## 2017-08-23 LAB
ANION GAP SERPL CALC-SCNC: 10 MMOL/L
BUN SERPL-MCNC: 16 MG/DL
CALCIUM SERPL-MCNC: 9.4 MG/DL
CHLORIDE SERPL-SCNC: 101 MMOL/L
CO2 SERPL-SCNC: 28 MMOL/L
CREAT SERPL-MCNC: 1.5 MG/DL
EST. GFR  (AFRICAN AMERICAN): >60 ML/MIN/1.73 M^2
EST. GFR  (NON AFRICAN AMERICAN): >60 ML/MIN/1.73 M^2
GLUCOSE SERPL-MCNC: 87 MG/DL
POTASSIUM SERPL-SCNC: 3.8 MMOL/L
SODIUM SERPL-SCNC: 139 MMOL/L

## 2017-08-23 PROCEDURE — 99999 PR PBB SHADOW E&M-EST. PATIENT-LVL IV: CPT | Mod: PBBFAC,,, | Performed by: NURSE PRACTITIONER

## 2017-08-23 PROCEDURE — 36415 COLL VENOUS BLD VENIPUNCTURE: CPT

## 2017-08-23 PROCEDURE — 3078F DIAST BP <80 MM HG: CPT | Mod: S$GLB,,, | Performed by: NURSE PRACTITIONER

## 2017-08-23 PROCEDURE — 99214 OFFICE O/P EST MOD 30 MIN: CPT | Mod: S$GLB,,, | Performed by: NURSE PRACTITIONER

## 2017-08-23 PROCEDURE — 3075F SYST BP GE 130 - 139MM HG: CPT | Mod: S$GLB,,, | Performed by: NURSE PRACTITIONER

## 2017-08-23 PROCEDURE — 80048 BASIC METABOLIC PNL TOTAL CA: CPT

## 2017-08-23 PROCEDURE — 3008F BODY MASS INDEX DOCD: CPT | Mod: S$GLB,,, | Performed by: NURSE PRACTITIONER

## 2017-08-23 RX ORDER — VERAPAMIL HYDROCHLORIDE 180 MG/1
180 CAPSULE, EXTENDED RELEASE ORAL DAILY
Qty: 30 CAPSULE | Refills: 11 | Status: SHIPPED | OUTPATIENT
Start: 2017-08-23 | End: 2017-12-01

## 2017-08-23 NOTE — PROGRESS NOTES
Mr. Shelley is a patient of Dr. Flores and was last seen in [facility] 7/10/2017.    Subjective:   Patient ID:  Kurt Shelley is a 31 y.o. male who presents for follow-up of Abnormal ECG (6 weeks fu) and Chest Pain    Problems  HTN  Asthma  Hypertrophic cardiomyopathy    HPI  Mr. Shelley is in clinic today for routine follow up.  Patient denies chest pain with exertion or at rest, palpitations, SOB, WAN, dizziness, syncope, edema, orthopnea, PND, or claudication.  Reports routine exercise.  He runs and plays basketball and works full time as a  without sx.  Hypertension is treated with CCB (amlodipine 5mg) and thiazide diuretic (HCTZ 25mg).  Reports following a low salt diet.  He had an echo on 7/13/17 that revealed concentric remodeling, normal systolic and diastolic function, and EF 65-70%. He had borderline increased density to the myocardium, raising a possibility of a myocardial infiltrative process so a cardiac MRI was performed.  Cardiac MRI on 7/31/17: Upper limits of LV mass with maximum wall thickness of 12mm. There is cavity obliteration noted of the mid LV to apex with systole with myocardial crypts noted in the basal anterolateral and inferolateral walls. No delayed hyper-enhancement noted. At this time, unable to fit the definition of a specific CM.     Family History   Problem Relation Age of Onset    Diabetes Mother     Hypertension Mother     Early death Brother 23     chf, Sleep apnea    Cancer Paternal Grandfather 78     colon ca and prostate ca     Review of Systems   Constitution: Negative for decreased appetite, diaphoresis, weakness, malaise/fatigue, weight gain and weight loss.   HENT: Negative for headaches.    Eyes: Negative for visual disturbance.   Cardiovascular: Negative for chest pain, claudication, dyspnea on exertion, irregular heartbeat, leg swelling, near-syncope, orthopnea, palpitations, paroxysmal nocturnal dyspnea and syncope.        Denies chest pressure  "  Respiratory: Negative for cough, hemoptysis, shortness of breath, sleep disturbances due to breathing and snoring.    Endocrine: Negative for cold intolerance and heat intolerance.   Hematologic/Lymphatic: Negative for bleeding problem. Does not bruise/bleed easily.   Musculoskeletal: Negative for myalgias.   Gastrointestinal: Negative for bloating, abdominal pain, anorexia, change in bowel habit, constipation, diarrhea, nausea and vomiting.   Neurological: Negative for difficulty with concentration, disturbances in coordination, excessive daytime sleepiness, dizziness, light-headedness, loss of balance and numbness.   Psychiatric/Behavioral: The patient does not have insomnia.      Allergies and current medications updated and reviewed:  Review of patient's allergies indicates:   Allergen Reactions    No known drug allergies      Current Outpatient Prescriptions   Medication Sig    amlodipine (NORVASC) 5 MG tablet Take 1 tablet (5 mg total) by mouth once daily.    hydrochlorothiazide (HYDRODIURIL) 25 MG tablet Take 1 tablet (25 mg total) by mouth once daily.     No current facility-administered medications for this visit.      Objective:     Right Arm BP - Sittin/79 (17 1439)  Left Arm BP - Sittin/79 (17 1439)    /79 (BP Location: Left arm, Patient Position: Sitting, BP Method: X-Large (Automatic))   Pulse 72   Ht 6' 2" (1.88 m)   Wt 89.4 kg (197 lb 1.5 oz)   SpO2 98%   BMI 25.31 kg/m²     Physical Exam   Constitutional: He is oriented to person, place, and time. Vital signs are normal. He appears well-developed and well-nourished. He is active. No distress.   HENT:   Head: Normocephalic and atraumatic.   Eyes: Conjunctivae and lids are normal. No scleral icterus.   Neck: Neck supple. Normal carotid pulses, no hepatojugular reflux and no JVD present. Carotid bruit is not present.   Cardiovascular: Normal rate, regular rhythm, S1 normal, S2 normal and intact distal pulses.  " PMI is not displaced.  Exam reveals gallop and S4. Exam reveals no friction rub.    No murmur heard.  Pulses:       Carotid pulses are 2+ on the right side, and 2+ on the left side.       Radial pulses are 2+ on the right side, and 2+ on the left side.        Dorsalis pedis pulses are 2+ on the right side, and 2+ on the left side.        Posterior tibial pulses are 2+ on the right side, and 2+ on the left side.   Pulmonary/Chest: Effort normal and breath sounds normal. No respiratory distress. He has no decreased breath sounds. He has no wheezes. He has no rhonchi. He has no rales. He exhibits no tenderness.   Abdominal: Soft. Normal appearance and bowel sounds are normal. He exhibits no distension, no fluid wave, no abdominal bruit, no ascites and no pulsatile midline mass. There is no hepatosplenomegaly. There is no tenderness.   Musculoskeletal: He exhibits no edema.   Neurological: He is alert and oriented to person, place, and time. Gait normal.   Skin: Skin is warm, dry and intact. No rash noted. He is not diaphoretic. Nails show no clubbing.   Psychiatric: He has a normal mood and affect. His speech is normal and behavior is normal. Judgment and thought content normal. Cognition and memory are normal.   Nursing note and vitals reviewed.      Chemistry        Component Value Date/Time     08/11/2017 1650    K 3.7 08/11/2017 1650     08/11/2017 1650    CO2 26 08/11/2017 1650    BUN 36 (H) 08/11/2017 1650    CREATININE 1.5 (H) 08/11/2017 1650    GLU 93 08/11/2017 1650        Component Value Date/Time    CALCIUM 10.0 08/11/2017 1650    ALKPHOS 59 08/11/2017 1650    AST 27 08/11/2017 1650    ALT 22 08/11/2017 1650    BILITOT 0.9 08/11/2017 1650    ESTGFRAFRICA >60.0 08/11/2017 1650    EGFRNONAA >60.0 08/11/2017 1650          Recent Labs  Lab 06/29/17  0841  08/11/17  1650   WHITE BLOOD CELL COUNT 2.65 L  < > 7.54   HEMOGLOBIN 13.7 L  < > 13.8 L   HEMATOCRIT 41.2  < > 39.5 L   MCV 85  < > 82   PLATELETS  207  < > 191   TSH 1.012  --   --    CHOLESTEROL 193  --   --    HDL 60  --   --    LDL CHOLESTEROL 125.4  --   --    TRIGLYCERIDES 38  --   --    HDL/CHOLESTEROL RATIO 31.1  --   --    < > = values in this interval not displayed.         Test(s) Reviewed  I have reviewed the following in detail:  [] Stress test   [] Angiography   [x] Echocardiogram   [x] Labs   [x] Other:  ECG, holter monitor, and cardiac MRI       Assessment/Plan:     Hypertrophic cardiomyopathy  Comments:  Change amlodipine to verapamil for negative inotropic effect. Discussed importance of follow up if he has syncope, SOB, and palpitations. Genetic testing  Orders:  -     verapamil (VERELAN) 180 MG C24P; Take 1 capsule (180 mg total) by mouth once daily.  Dispense: 30 capsule; Refill: 11    Essential hypertension  Comments:  Verapamil 180mg daily. Stop amlodipine. Continue HCTZ. Instructed to stay hydrated based on pre-renal changes on bmp. 2gm sodium diet   Orders:  -     verapamil (VERELAN) 180 MG C24P; Take 1 capsule (180 mg total) by mouth once daily.  Dispense: 30 capsule; Refill: 11    Family history of CHF (congestive heart failure)  Comments:  Brother  at 23 from CHF.       Return in about 6 months (around 2018).

## 2017-08-23 NOTE — PATIENT INSTRUCTIONS
Restrict salt to no more than 2,000mg a day  -No more than 100mg of salt in a snack  -No more than 250mg of salt in an entree  -No more than 500mg of salt in an entire meal    Stop amlodipine    Start verapamil    Keep appointment for genetic testing

## 2017-08-24 ENCOUNTER — TELEPHONE (OUTPATIENT)
Dept: GENETICS | Facility: CLINIC | Age: 32
End: 2017-08-24

## 2017-08-24 ENCOUNTER — PATIENT MESSAGE (OUTPATIENT)
Dept: GENETICS | Facility: CLINIC | Age: 32
End: 2017-08-24

## 2017-08-24 NOTE — TELEPHONE ENCOUNTER
----- Message from Jerome Nair sent at 8/24/2017 11:37 AM CDT -----  Contact: Pt   Pt would like a call back from staff in ref to scheduling an earlier visit    Pt can be reached at [phone]

## 2017-08-25 ENCOUNTER — PATIENT MESSAGE (OUTPATIENT)
Dept: CARDIOLOGY | Facility: CLINIC | Age: 32
End: 2017-08-25

## 2017-08-31 ENCOUNTER — OFFICE VISIT (OUTPATIENT)
Dept: GENETICS | Facility: CLINIC | Age: 32
End: 2017-08-31
Payer: COMMERCIAL

## 2017-08-31 ENCOUNTER — TELEPHONE (OUTPATIENT)
Dept: GENETICS | Facility: CLINIC | Age: 32
End: 2017-08-31

## 2017-08-31 ENCOUNTER — LAB VISIT (OUTPATIENT)
Dept: LAB | Facility: HOSPITAL | Age: 32
End: 2017-08-31
Attending: MEDICAL GENETICS
Payer: COMMERCIAL

## 2017-08-31 VITALS — BODY MASS INDEX: 25.29 KG/M2 | HEIGHT: 74 IN | WEIGHT: 197.06 LBS

## 2017-08-31 DIAGNOSIS — I42.2 HYPERTROPHIC CARDIOMYOPATHY: ICD-10-CM

## 2017-08-31 DIAGNOSIS — Z82.41 FAMILY HISTORY OF SUDDEN CARDIAC DEATH IN BROTHER: ICD-10-CM

## 2017-08-31 DIAGNOSIS — I10 ESSENTIAL HYPERTENSION: ICD-10-CM

## 2017-08-31 DIAGNOSIS — I42.2 HYPERTROPHIC CARDIOMYOPATHY: Primary | ICD-10-CM

## 2017-08-31 PROCEDURE — 36415 COLL VENOUS BLD VENIPUNCTURE: CPT | Mod: PO

## 2017-08-31 PROCEDURE — 99245 OFF/OP CONSLTJ NEW/EST HI 55: CPT | Mod: S$GLB,,, | Performed by: MEDICAL GENETICS

## 2017-08-31 PROCEDURE — 99999 PR PBB SHADOW E&M-EST. PATIENT-LVL III: CPT | Mod: PBBFAC,,, | Performed by: MEDICAL GENETICS

## 2017-08-31 NOTE — TELEPHONE ENCOUNTER
Spoke with pt and rescheduled appt from 11/14 to today at 1pm per cancellation list. Pt verbalized understanding.

## 2017-08-31 NOTE — LETTER
August 31, 2017      Ton Flores MD  [address]  [facility] [address]           [facility]  [address]  [facility] [address]  Phone: [phone]          Patient: Kurt Shelley   MR Number: [id number]   YOB: 1985   Date of Visit: 8/31/2017       Dear Dr. Ton Flores:    Thank you for referring Kurt Shelley to me for evaluation. Attached you will find relevant portions of my assessment and plan of care.    If you have questions, please do not hesitate to call me. I look forward to following Kurt Shelley along with you.    Sincerely,    Dutch Wyatt MD    Enclosure  CC:  No Recipients    If you would like to receive this communication electronically, please contact [email or web address] or [phone] to request more information on [employer] Link access.    For providers and/or their staff who would like to refer a patient to [facility], please contact us through our one-stop-shop provider referral line, [facility] [place], at [phone].    If you feel you have received this communication in error or would no longer like to receive these types of communications, please e-mail [email or web address]

## 2017-09-01 NOTE — PROGRESS NOTES
Elias Shelley  DOS: 17  : [birth year]  MRN: [id number]    REFERING MD: Ton Flores    REASON FOR CONSULT: Our Medical Genetics Service was asked to evaluate this 32-year-old  male with personal history of hypertrophic cardiomyopathy (HCM).     HISTORY OF PRESENT ILLNESS: Mr. Shelley was recently diagnosed with HCM after a few months of feeling sporadic chest pains not associated with any particular activity. His EKG, echo and cardiac MRI point towards HCM. He also has HTN. His cardiologist Dr. Flores referred him for a genetic evaluation of hereditary HCM.    PAST MEDICAL HISTORY: as above, HCM, HTN, allergic rhinitis.    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    FAMILY HISTORY: Mr. Shelley has a family history of sudden death in the brother at the age of 23 from heart failure. He did have obesity. His dad  young at 34 from a gunshot. Theres history of MI in maternal uncle in his 30s. His mom is 58 and no history of heart disease. The patient has 2 sons who are 14 and 12 and 1 daughter who is 9. His children did not have a cardiac evaluation.     PHYSICAL EXAMINATION: WT: 197 lbs, HT: 62, BMI 25.   HEENT: Normocephalic. No dysmorphic facial features noted.   NECK: Supple.   CHEST: normally formed.   CARDIAC: Regular rate and rhythm.  ABDOMEN: Soft, non-distended, non-tender.   MUSCULOSKELETAL: No dysmorphic features of hands or feet.   NEUROLOGICAL: Normal strength and tone. Normal gait. DTRs 2+, PERRLA, EOMI. Normal language and cognition.    IMPRESSION: I reviewed the patients medical and family history and discussed the etiology of HCM which is defined clinically as unexplained ventricular hypertrophy. HCM is a relatively common disease with an incidence of about 1 in 500 adults. This  condition has diverse clinical and pathological manifestations. Patients with HCM generally experience dyspnea, chest pain, intolerance to exercise, syncopal episodes, and in some cases sudden death. The risk for sudden death is estimated to be between 0.1 and 1.0% in adults.  Symptoms may present at any age, but patients, like Mr. Shelley, may have HCM findings by echocardiogram, and be asymptomatic. His brothers sudden death at 23 from heart failure is suspicious for HCM and his father didnt live long enough to  whether he had it. Theres extreme variable expressivity and incomplete penetrance even within the same family.  Most hereditary forms of HCM have been reported with autosomal dominant inheritance. Mr. Shelley has an up to 50% chance of passing it on to his children. Mr. Shelley expressed a desire to have gene testing.  [employer] offers a 25 gene panel for HCM (ACTC1, ACTN2, CAV3, CSRP3, FHL1, GLA, JPH2, LAMP2, MTTG, MTTI, MTTK, MTTQ, MYBPC3, MYH7, MYL2, MYL3, PLN, PRKAG2, TCAP, TNNC1, TNNI3, TNNT2, TPM1, TTR, VCL). This panel includes genes associated with Fabry disease, Transthyretin Amyloidosis, Danon disease, and Limb Girdle Muscular Dystrophy, among others.  It is important to know if this patients HCM was caused by a mutation in any of the genes associated with these other systemic conditions, as these conditions can be managed differently.  A positive genetic test result would provide a definitive cause for this patients history and would ensure this patient is being treated appropriately. If a mutation is found in any one these genes, we can offer targeted mutation testing to the patients children and mother. The risks of a VUS and insurance repercussions were reviewed with the patient. Mr. Shelley has expressed understanding of the information discusses and has opted to purse testing at this time.    RECOMMENDATIONS:   1. HCM panel 25 genes (ACTC1, ACTN2, CAV3, CSRP3, FHL1, GLA, JPH2, LAMP2,  MTTG, MTTI, MTTK, MTTQ, MYBPC3, MYH7, MYL2, MYL3, PLN, PRKAG2, TCAP, TNNC1, TNNI3, TNNT2, TPM1, TTR, VCL).  2. If negative, consider additional genes encoding cardiac sarcomere proteins.  3. If positive, advise the patient for appropriate healthcare management.  4. If positive, offer testing to appropriate relatives.  5. Follow-up when results are complete.    REFERENCE:  Connor Rudd. Hypertrophic Cardiomyopathy Overview. 2008 Aug 5 [Updated 2014 Jan 16]. In: Christian RA, Dann MP, Devonte HH, et al., editors. [employer]® [Internet]. [place]): [employer], [place]; 4246-5090. Available from: http://www.ncbi.nlm.nih.gov/books/EQW3715/    Time spent: 80 minutes, more than 50% was spent in counseling. The note is in epic.     Dutch Wyatt M.D.                                                                 Section Head - Medical Genetics                                                    [facility]

## 2017-11-14 ENCOUNTER — TELEPHONE (OUTPATIENT)
Dept: GENETICS | Facility: CLINIC | Age: 32
End: 2017-11-14

## 2017-11-14 NOTE — TELEPHONE ENCOUNTER
Spoke to the patient to disclose negative results from the HCM/DCM/LVNC panel testing. This does not rule out an underlying hereditary disorder. We would like to see the patient back for a follow-up in 1 year. I have confirmed the home address. I am mailing copies of the results to the patient's home. Mr. Shelley denied questions at this time.

## 2017-12-01 ENCOUNTER — PATIENT MESSAGE (OUTPATIENT)
Dept: CARDIOLOGY | Facility: CLINIC | Age: 32
End: 2017-12-01

## 2017-12-01 ENCOUNTER — TELEPHONE (OUTPATIENT)
Dept: CARDIOLOGY | Facility: CLINIC | Age: 32
End: 2017-12-01

## 2017-12-01 DIAGNOSIS — I10 ESSENTIAL HYPERTENSION: Primary | ICD-10-CM

## 2017-12-01 DIAGNOSIS — I10 ESSENTIAL HYPERTENSION: ICD-10-CM

## 2017-12-01 RX ORDER — AMLODIPINE BESYLATE 5 MG/1
5 TABLET ORAL DAILY
Qty: 30 TABLET | Refills: 11 | Status: SHIPPED | OUTPATIENT
Start: 2017-12-01 | End: 2017-12-01 | Stop reason: SDUPTHER

## 2017-12-01 NOTE — TELEPHONE ENCOUNTER
Patient complaining of problems with memory since starting verapamil (and HCTZ?).  Also, having problems with erectile dysfunction (ED), which he did not have on amlodipine at 5 mg qd.  BP has been consistently >=140 systolic. After discussion, will continue HCTZ, discontinue verapamil, restart amlodipine at 5 mg qd.  If BP remains elevated after a month, will increase to 10 mg qd.  Will also send a link to the DASH diet.Patient acknowledged understanding of information and agreement with plan.

## 2017-12-04 RX ORDER — AMLODIPINE BESYLATE 5 MG/1
TABLET ORAL
Qty: 90 TABLET | Refills: 3 | Status: SHIPPED | OUTPATIENT
Start: 2017-12-04 | End: 2018-05-02 | Stop reason: SINTOL

## 2017-12-12 LAB
MISCELLANEOUS TEST NAME: NORMAL
REFERENCE LAB: NORMAL
SPECIMEN TYPE: NORMAL
TEST RESULT: NORMAL

## 2018-01-17 NOTE — TELEPHONE ENCOUNTER
Discussed results of echo and MRI with patient.  Also discussed the medical therapy he is currently taking for his hypertension.  Patient will come in and undergo genetic testing for hypertrophic cardiomyopathy.   Normal

## 2018-03-02 ENCOUNTER — PATIENT MESSAGE (OUTPATIENT)
Dept: CARDIOLOGY | Facility: CLINIC | Age: 33
End: 2018-03-02

## 2018-04-09 PROBLEM — Z82.41 FAMILY HISTORY OF SUDDEN CARDIAC DEATH IN BROTHER: Status: RESOLVED | Noted: 2017-08-23 | Resolved: 2018-04-09

## 2018-04-26 ENCOUNTER — TELEPHONE (OUTPATIENT)
Dept: UROLOGY | Facility: CLINIC | Age: 33
End: 2018-04-26

## 2018-04-26 ENCOUNTER — PATIENT MESSAGE (OUTPATIENT)
Dept: UROLOGY | Facility: CLINIC | Age: 33
End: 2018-04-26

## 2018-05-02 ENCOUNTER — OFFICE VISIT (OUTPATIENT)
Dept: CARDIOLOGY | Facility: CLINIC | Age: 33
End: 2018-05-02
Payer: COMMERCIAL

## 2018-05-02 VITALS
HEIGHT: 74 IN | SYSTOLIC BLOOD PRESSURE: 126 MMHG | DIASTOLIC BLOOD PRESSURE: 67 MMHG | WEIGHT: 200.81 LBS | BODY MASS INDEX: 25.77 KG/M2 | HEART RATE: 68 BPM

## 2018-05-02 DIAGNOSIS — R07.9 CHEST PAIN, UNSPECIFIED TYPE: ICD-10-CM

## 2018-05-02 DIAGNOSIS — I10 ESSENTIAL HYPERTENSION: Primary | ICD-10-CM

## 2018-05-02 DIAGNOSIS — I42.2 HYPERTROPHIC CARDIOMYOPATHY: ICD-10-CM

## 2018-05-02 PROCEDURE — 99999 PR PBB SHADOW E&M-EST. PATIENT-LVL III: CPT | Mod: PBBFAC,,, | Performed by: NURSE PRACTITIONER

## 2018-05-02 PROCEDURE — 3078F DIAST BP <80 MM HG: CPT | Mod: CPTII,S$GLB,, | Performed by: NURSE PRACTITIONER

## 2018-05-02 PROCEDURE — 99214 OFFICE O/P EST MOD 30 MIN: CPT | Mod: SA,S$GLB,, | Performed by: NURSE PRACTITIONER

## 2018-05-02 PROCEDURE — 3074F SYST BP LT 130 MM HG: CPT | Mod: CPTII,S$GLB,, | Performed by: NURSE PRACTITIONER

## 2018-05-02 RX ORDER — LISINOPRIL 10 MG/1
10 TABLET ORAL DAILY
Qty: 90 TABLET | Refills: 3 | Status: SHIPPED | OUTPATIENT
Start: 2018-05-02 | End: 2018-09-13

## 2018-05-02 RX ORDER — CHLORTHALIDONE 25 MG/1
25 TABLET ORAL DAILY
Qty: 90 TABLET | Refills: 3 | Status: SHIPPED | OUTPATIENT
Start: 2018-05-02 | End: 2018-05-02 | Stop reason: SDUPTHER

## 2018-05-02 RX ORDER — CHLORTHALIDONE 25 MG/1
TABLET ORAL
Qty: 45 TABLET | Refills: 3 | Status: SHIPPED | OUTPATIENT
Start: 2018-05-02 | End: 2019-05-29 | Stop reason: SDUPTHER

## 2018-05-02 NOTE — PATIENT INSTRUCTIONS
Stop the HCTZ and the amlodipine.    Start the lisinopril 10mg and chlorthalidone 25mg    Please have your blood drawn in 2 weeks.   Lisinopril tablets  What is this medicine?  LISINOPRIL (lyse IN oh pril) is an ACE inhibitor. This medicine is used to treat high blood pressure and heart failure. It is also used to protect the heart immediately after a heart attack.  How should I use this medicine?  Take this medicine by mouth with a glass of water. Follow the directions on your prescription label. You may take this medicine with or without food. If it upsets your stomach, take it with food. Take your medicine at regular intervals. Do not take it more often than directed. Do not stop taking except on your doctor's advice.  Talk to your pediatrician regarding the use of this medicine in children. Special care may be needed. While this drug may be prescribed for children as young as 6 years of age for selected conditions, precautions do apply.  What side effects may I notice from receiving this medicine?  Side effects that you should report to your doctor or health care professional as soon as possible:  · allergic reactions like skin rash, itching or hives, swelling of the hands, feet, face, lips, throat, or tongue  · breathing problems  · signs and symptoms of kidney injury like trouble passing urine or change in the amount of urine  · signs and symptoms of increased potassium like muscle weakness; chest pain; or fast, irregular heartbeat  · signs and symptoms of liver injury like dark yellow or brown urine; general ill feeling or flu-like symptoms; light-colored stools; loss of appetite; nausea; right upper belly pain; unusually weak or tired; yellowing of the eyes or skin  · signs and symptoms of low blood pressure like dizziness; feeling faint or lightheaded, falls; unusually weak or tired  · stomach pain with or without nausea and vomiting  Side effects that usually do not require medical attention (report to  your doctor or health care professional if they continue or are bothersome):  · changes in taste  · cough  · dizziness  · fever  · headache  · sensitivity to light  What may interact with this medicine?  Do not take this medicine with any of the following medications:  · hymenoptera venomThis medicines may also interact with the following medications:  · aliskiren  · angiotensin receptor blockers, like losartan or valsartan  · certain medicines for diabetes  · diuretics  · everolimus  · gold compounds  · lithium  · NSAIDs, medicines for pain and inflammation, like ibuprofen or naproxen  · potassium salts or supplements  · salt substitutes  · sirolimus  · temsirolimus  What if I miss a dose?  If you miss a dose, take it as soon as you can. If it is almost time for your next dose, take only that dose. Do not take double or extra doses.  Where should I keep my medicine?  Keep out of the reach of children.  Store at room temperature between 15 and 30 degrees C (59 and 86 degrees F). Protect from moisture. Keep container tightly closed. Throw away any unused medicine after the expiration date.  What should I tell my health care provider before I take this medicine?  They need to know if you have any of these conditions:  · diabetes  · heart or blood vessel disease  · kidney disease  · low blood pressure  · previous swelling of the tongue, face, or lips with difficulty breathing, difficulty swallowing, hoarseness, or tightening of the throat  · an unusual or allergic reaction to lisinopril, other ACE inhibitors, insect venom, foods, dyes, or preservatives  · pregnant or trying to get pregnant  · breast-feeding  What should I watch for while using this medicine?  Visit your doctor or health care professional for regular check ups. Check your blood pressure as directed. Ask your doctor what your blood pressure should be, and when you should contact him or her.  Do not treat yourself for coughs, colds, or pain while you are  using this medicine without asking your doctor or health care professional for advice. Some ingredients may increase your blood pressure.  Women should inform their doctor if they wish to become pregnant or think they might be pregnant. There is a potential for serious side effects to an unborn child. Talk to your health care professional or pharmacist for more information.  Check with your doctor or health care professional if you get an attack of severe diarrhea, nausea and vomiting, or if you sweat a lot. The loss of too much body fluid can make it dangerous for you to take this medicine.  You may get drowsy or dizzy. Do not drive, use machinery, or do anything that needs mental alertness until you know how this drug affects you. Do not stand or sit up quickly, especially if you are an older patient. This reduces the risk of dizzy or fainting spells. Alcohol can make you more drowsy and dizzy. Avoid alcoholic drinks.  Avoid salt substitutes unless you are told otherwise by your doctor or health care professional.  NOTE:This sheet is a summary. It may not cover all possible information. If you have questions about this medicine, talk to your doctor, pharmacist, or health care provider. Copyright© 2017 [employer]        Chlorthalidone tablets  What is this medicine?  CHLORTHALIDONE (klor THAL i done) is a diuretic. It increases the amount of urine passed, which causes the body to lose salt and water. This medicine is used to treat high blood pressure and edema or water retention.  How should I use this medicine?  Take this medicine by mouth with a glass of water. Follow the directions on the prescription label. It is best to take your dose in the morning with food. Take your medicine at regular intervals. Do not take your medicine more often than directed. Do not stop taking except on your doctor's advice.  Talk to your pediatrician regarding the use of this medicine in children. Special care may be  needed.  What side effects may I notice from receiving this medicine?  Side effects that you should report to your doctor or health care professional as soon as possible:  · allergic reactions like skin rash, itching or hives, swelling of the face, lips, or tongue  · dark urine  · dry mouth  · excess thirst  · fast, irregular heart rate  · fever, chills  · muscle pain, cramps, or spasm  · nausea, vomiting  · redness, blistering, peeling or loosening of the skin, including inside the mouth  · tingling, pain or numbness in the hands or feet  · unusually weak or tired  · yellowing of the eyes or skin  Side effects that usually do not require medical attention (report to your doctor or health care professional if they continue or are bothersome):  · diarrhea or constipation  · headache  · impotence  · loss of appetite  · stomach upset  What may interact with this medicine?  · barbiturate medicines for sleep or seizure control  · digoxin  · lithium  · medicines for diabetes  · norepinephrine  · other medicines for high blood pressure  · some pain medicines  · steroid hormones like prednisone, cortisone, hydrocortisone, corticotropin  · tubocurarine  What if I miss a dose?  If you miss a dose, take it as soon as you can. If it is almost time for your next dose, take only that dose. Do not take double or extra doses.  Where should I keep my medicine?  Keep out of the reach of children.  Store at room temperature between 15 and 30 degrees C (59 and 86 degrees F). Keep container tightly closed. Throw away any unused medicine after the expiration date.  What should I tell my health care provider before I take this medicine?  They need to know if you have any of these conditions:  · asthma  · diabetes  · gout  · kidney disease  · liver disease  · parathyroid disease  · systemic lupus erythematosus (SLE)  · taking cortisone, digoxin, lithium carbonate, or drugs for diabetes  · an unusual or allergic reaction to chlorthalidone,  sulfa drugs, other medicines, foods, dyes, or preservatives  · pregnant or trying to get pregnant  · breast-feeding  What should I watch for while using this medicine?  Visit your doctor or health care professional for regular check ups. Check your blood pressure as directed. Ask your doctor or health care professional what your blood pressure should be and when you should contact him or her.  You may need to be on a special diet while taking this medicine. Ask your doctor.  You may get drowsy or dizzy. Do not drive, use machinery, or do anything that needs mental alertness until you know how this medicine affects you. Do not stand or sit up quickly, especially if you are an older patient. This reduces the risk of dizzy or fainting spells. Alcohol may interfere with the effect of this medicine. Avoid alcoholic drinks.  This medicine may affect your blood sugar level. If you have diabetes, check with your doctor or health care professional before changing the dose of your diabetic medicine.  This medicine can make you more sensitive to the sun. Keep out of the sun. If you cannot avoid being in the sun, wear protective clothing and use sunscreen. Do not use sun lamps or tanning beds/booths.  NOTE:This sheet is a summary. It may not cover all possible information. If you have questions about this medicine, talk to your doctor, pharmacist, or health care provider. Copyright© 2017 [employer]

## 2018-05-02 NOTE — PROGRESS NOTES
Mr. Shelley is a patient of Dr. Flores and was last seen in [facility] Cardiology Visit 8/23/17.    Subjective:   Patient ID:  Kurt Shelley is a 32 y.o. male who presents for follow-up of Chest Pain and Palpitations    Problems  HTN  Asthma  Hypertrophic cardiomyopathy  -questionable infiltrative hypertophy; cardiac MRI r/o  Family h/o early heart dz: brother CHF death at 23    HPI  Mr. Shelley is in clinic today for routine follow up.  He had chest pain that was substernal squeezing tightness that lasted 5-10 minutes.  No associated N/V, sweating, or radiation of the pain.  He was evaluated in the ED in [place] and monitored overnight.  Negative TRP levels and he was released the next day.  Since his last visit, he was seen by genetics and there were no mutations found. He was switched to verapamil at his last visit and went back to amlodipine because of ED.  Echo on 7/13/17 that revealed concentric remodeling, normal systolic and diastolic function, and EF 65-70%. He had borderline increased density to the myocardium, raising a possibility of a myocardial infiltrative process so a cardiac MRI was performed.  Cardiac MRI on 7/31/17: Upper limits of LV mass with maximum wall thickness of 12mm. There is cavity obliteration noted of the mid LV to apex with systole with myocardial crypts noted in the basal anterolateral and inferolateral walls. No delayed hyper-enhancement noted.     Review of Systems   Constitution: Negative for decreased appetite, diaphoresis, weakness, malaise/fatigue, weight gain and weight loss.   Eyes: Negative for visual disturbance.   Cardiovascular: Negative for chest pain, claudication, dyspnea on exertion, irregular heartbeat, leg swelling, near-syncope, orthopnea, palpitations, paroxysmal nocturnal dyspnea and syncope.        Denies chest pressure   Respiratory: Negative for cough, hemoptysis, shortness of breath, sleep disturbances due to breathing and snoring.    Endocrine: Negative for cold  "intolerance and heat intolerance.   Hematologic/Lymphatic: Negative for bleeding problem. Does not bruise/bleed easily.   Musculoskeletal: Negative for myalgias.   Gastrointestinal: Negative for bloating, abdominal pain, anorexia, change in bowel habit, constipation, diarrhea, nausea and vomiting.   Neurological: Negative for difficulty with concentration, disturbances in coordination, excessive daytime sleepiness, dizziness, headaches, light-headedness, loss of balance and numbness.   Psychiatric/Behavioral: The patient does not have insomnia.        Allergies and current medications updated and reviewed:  Review of patient's allergies indicates:   Allergen Reactions    No known drug allergies      Current Outpatient Prescriptions   Medication Sig    chlorthalidone (HYGROTEN) 25 MG Tab Take 1/2 tablet (12.5mg) by mouth daily    lisinopril 10 MG tablet Take 1 tablet (10 mg total) by mouth once daily.     No current facility-administered medications for this visit.        Objective:     Right Arm BP - Sittin/63 (18 1555)  Left Arm BP - Sittin/67 (18 1555)    /67 (BP Location: Left arm, Patient Position: Sitting, BP Method: Medium (Automatic))   Pulse 68   Ht 6' 2" (1.88 m)   Wt 91.1 kg (200 lb 13.4 oz)   BMI 25.79 kg/m²     Physical Exam   Constitutional: He is oriented to person, place, and time. Vital signs are normal. He appears well-developed and well-nourished. He is active. No distress.   HENT:   Head: Normocephalic and atraumatic.   Eyes: Conjunctivae and lids are normal. No scleral icterus.   Neck: Neck supple. Normal carotid pulses, no hepatojugular reflux and no JVD present. Carotid bruit is not present.   Cardiovascular: Normal rate, regular rhythm, S1 normal, S2 normal and intact distal pulses.  PMI is not displaced.  Exam reveals no gallop and no friction rub.    No murmur heard.  Pulses:       Carotid pulses are 2+ on the right side, and 2+ on the left side.       " Radial pulses are 2+ on the right side, and 2+ on the left side.        Dorsalis pedis pulses are 2+ on the right side, and 2+ on the left side.        Posterior tibial pulses are 1+ on the right side, and 1+ on the left side.   Pulmonary/Chest: Effort normal and breath sounds normal. No respiratory distress. He has no decreased breath sounds. He has no wheezes. He has no rhonchi. He has no rales. He exhibits no tenderness.   Abdominal: Soft. Normal appearance and bowel sounds are normal. He exhibits no distension, no fluid wave, no abdominal bruit, no ascites and no pulsatile midline mass. There is no hepatosplenomegaly. There is no tenderness.   Musculoskeletal: He exhibits no edema.   Neurological: He is alert and oriented to person, place, and time. Gait normal.   Skin: Skin is warm, dry and intact. No rash noted. He is not diaphoretic. Nails show no clubbing.   Psychiatric: He has a normal mood and affect. His speech is normal and behavior is normal. Judgment and thought content normal. Cognition and memory are normal.   Nursing note and vitals reviewed.      Chemistry        Component Value Date/Time     08/23/2017 1426    K 3.8 08/23/2017 1426     08/23/2017 1426    CO2 28 08/23/2017 1426    BUN 16 08/23/2017 1426    CREATININE 1.5 (H) 08/23/2017 1426    GLU 87 08/23/2017 1426        Component Value Date/Time    CALCIUM 9.4 08/23/2017 1426    ALKPHOS 59 08/11/2017 1650    AST 27 08/11/2017 1650    ALT 22 08/11/2017 1650    BILITOT 0.9 08/11/2017 1650    ESTGFRAFRICA >60.0 08/23/2017 1426    EGFRNONAA >60.0 08/23/2017 1426        No results found for: LABA1C, HGBA1C      Recent Labs  Lab 06/29/17  0841  08/11/17  1650   WHITE BLOOD CELL COUNT 2.65 L  < > 7.54   HEMOGLOBIN 13.7 L  < > 13.8 L   HEMATOCRIT 41.2  < > 39.5 L   MCV 85  < > 82   PLATELETS 207  < > 191   TSH 1.012  --   --    CHOLESTEROL 193  --   --    HDL 60  --   --    LDL CHOLESTEROL 125.4  --   --    TRIGLYCERIDES 38  --   --     HDL/CHOLESTEROL RATIO 31.1  --   --    < > = values in this interval not displayed.           Test(s) Reviewed  I have reviewed the following in detail:  [] Stress test   [] Angiography   [x] Echocardiogram   [x] Labs   [] Other:         Assessment/Plan:     Essential hypertension  Comments:  BP at goal <130/80. SE with amlodipine. Stop amlodipine and HCTZ. Start chlortalidone 25mg and lisinopril 10mg. Rpt bmp 2wks  Orders:  -     lisinopril 10 MG tablet; Take 1 tablet (10 mg total) by mouth once daily.  Dispense: 90 tablet; Refill: 3  -     Discontinue: chlorthalidone (HYGROTEN) 25 MG Tab; Take 1 tablet (25 mg total) by mouth once daily.  Dispense: 90 tablet; Refill: 3  -     Basic metabolic panel; Future; Expected date: 05/16/2018  -     chlorthalidone (HYGROTEN) 25 MG Tab; Take 1/2 tablet (12.5mg) by mouth daily  Dispense: 45 tablet; Refill: 3    Hypertrophic cardiomyopathy  Comments:  Non-obstructive. Tolerating exercise without sx.     Chest pain, unspecified type  Comments:  Negative w/u in the ED. No recurrence of sx despite working out and playing basketball. -      Follow-up in about 3 months (around 8/2/2018).

## 2018-09-13 ENCOUNTER — OFFICE VISIT (OUTPATIENT)
Dept: INTERNAL MEDICINE | Facility: CLINIC | Age: 33
End: 2018-09-13
Payer: COMMERCIAL

## 2018-09-13 ENCOUNTER — LAB VISIT (OUTPATIENT)
Dept: LAB | Facility: HOSPITAL | Age: 33
End: 2018-09-13
Attending: FAMILY MEDICINE
Payer: COMMERCIAL

## 2018-09-13 VITALS
WEIGHT: 199.75 LBS | HEART RATE: 68 BPM | HEIGHT: 74 IN | RESPIRATION RATE: 18 BRPM | TEMPERATURE: 98 F | DIASTOLIC BLOOD PRESSURE: 89 MMHG | BODY MASS INDEX: 25.64 KG/M2 | SYSTOLIC BLOOD PRESSURE: 127 MMHG

## 2018-09-13 DIAGNOSIS — D70.9 NEUTROPENIA, UNSPECIFIED TYPE: ICD-10-CM

## 2018-09-13 DIAGNOSIS — Z00.00 ROUTINE GENERAL MEDICAL EXAMINATION AT A HEALTH CARE FACILITY: Primary | ICD-10-CM

## 2018-09-13 DIAGNOSIS — R94.31 ABNORMAL ECG: ICD-10-CM

## 2018-09-13 DIAGNOSIS — I42.2 HYPERTROPHIC CARDIOMYOPATHY: ICD-10-CM

## 2018-09-13 DIAGNOSIS — I10 ESSENTIAL HYPERTENSION: ICD-10-CM

## 2018-09-13 DIAGNOSIS — I42.9 CARDIOMYOPATHY, UNSPECIFIED TYPE: ICD-10-CM

## 2018-09-13 DIAGNOSIS — Z00.00 ROUTINE GENERAL MEDICAL EXAMINATION AT A HEALTH CARE FACILITY: ICD-10-CM

## 2018-09-13 DIAGNOSIS — J30.1 ALLERGIC RHINITIS DUE TO POLLEN, UNSPECIFIED SEASONALITY: ICD-10-CM

## 2018-09-13 DIAGNOSIS — M17.12 OSTEOARTHRITIS OF LEFT KNEE, UNSPECIFIED OSTEOARTHRITIS TYPE: ICD-10-CM

## 2018-09-13 DIAGNOSIS — D64.9 ANEMIA, UNSPECIFIED TYPE: ICD-10-CM

## 2018-09-13 LAB
ALBUMIN SERPL BCP-MCNC: 4.5 G/DL
ALP SERPL-CCNC: 58 U/L
ALT SERPL W/O P-5'-P-CCNC: 15 U/L
ANION GAP SERPL CALC-SCNC: 9 MMOL/L
AST SERPL-CCNC: 21 U/L
BASOPHILS # BLD AUTO: 0.05 K/UL
BASOPHILS # BLD AUTO: 0.05 K/UL
BASOPHILS NFR BLD: 1.5 %
BASOPHILS NFR BLD: 1.5 %
BILIRUB SERPL-MCNC: 0.6 MG/DL
BUN SERPL-MCNC: 21 MG/DL
CALCIUM SERPL-MCNC: 10.2 MG/DL
CHLORIDE SERPL-SCNC: 100 MMOL/L
CHOLEST SERPL-MCNC: 187 MG/DL
CHOLEST/HDLC SERPL: 2.8 {RATIO}
CO2 SERPL-SCNC: 28 MMOL/L
CREAT SERPL-MCNC: 1.3 MG/DL
DIFFERENTIAL METHOD: ABNORMAL
DIFFERENTIAL METHOD: ABNORMAL
EOSINOPHIL # BLD AUTO: 0.2 K/UL
EOSINOPHIL # BLD AUTO: 0.2 K/UL
EOSINOPHIL NFR BLD: 4.5 %
EOSINOPHIL NFR BLD: 4.5 %
ERYTHROCYTE [DISTWIDTH] IN BLOOD BY AUTOMATED COUNT: 13.6 %
ERYTHROCYTE [DISTWIDTH] IN BLOOD BY AUTOMATED COUNT: 13.6 %
EST. GFR  (AFRICAN AMERICAN): >60 ML/MIN/1.73 M^2
EST. GFR  (NON AFRICAN AMERICAN): >60 ML/MIN/1.73 M^2
ESTIMATED AVG GLUCOSE: 120 MG/DL
FERRITIN SERPL-MCNC: 112 NG/ML
FOLATE SERPL-MCNC: 7.8 NG/ML
GLUCOSE SERPL-MCNC: 99 MG/DL
HBA1C MFR BLD HPLC: 5.8 %
HCT VFR BLD AUTO: 45.1 %
HCT VFR BLD AUTO: 45.1 %
HDLC SERPL-MCNC: 66 MG/DL
HDLC SERPL: 35.3 %
HGB BLD-MCNC: 14.7 G/DL
HGB BLD-MCNC: 14.7 G/DL
IMM GRANULOCYTES # BLD AUTO: 0 K/UL
IMM GRANULOCYTES # BLD AUTO: 0 K/UL
IMM GRANULOCYTES NFR BLD AUTO: 0 %
IMM GRANULOCYTES NFR BLD AUTO: 0 %
IRON SERPL-MCNC: 69 UG/DL
LDLC SERPL CALC-MCNC: 113.4 MG/DL
LYMPHOCYTES # BLD AUTO: 1.7 K/UL
LYMPHOCYTES # BLD AUTO: 1.7 K/UL
LYMPHOCYTES NFR BLD: 50.2 %
LYMPHOCYTES NFR BLD: 50.2 %
MCH RBC QN AUTO: 28.4 PG
MCH RBC QN AUTO: 28.4 PG
MCHC RBC AUTO-ENTMCNC: 32.6 G/DL
MCHC RBC AUTO-ENTMCNC: 32.6 G/DL
MCV RBC AUTO: 87 FL
MCV RBC AUTO: 87 FL
MONOCYTES # BLD AUTO: 0.2 K/UL
MONOCYTES # BLD AUTO: 0.2 K/UL
MONOCYTES NFR BLD: 7.3 %
MONOCYTES NFR BLD: 7.3 %
NEUTROPHILS # BLD AUTO: 1.2 K/UL
NEUTROPHILS # BLD AUTO: 1.2 K/UL
NEUTROPHILS NFR BLD: 36.5 %
NEUTROPHILS NFR BLD: 36.5 %
NONHDLC SERPL-MCNC: 121 MG/DL
NRBC BLD-RTO: 0 /100 WBC
NRBC BLD-RTO: 0 /100 WBC
PLATELET # BLD AUTO: 224 K/UL
PLATELET # BLD AUTO: 224 K/UL
PMV BLD AUTO: 11.3 FL
PMV BLD AUTO: 11.3 FL
POTASSIUM SERPL-SCNC: 4.8 MMOL/L
PROT SERPL-MCNC: 7.8 G/DL
RBC # BLD AUTO: 5.17 M/UL
RBC # BLD AUTO: 5.17 M/UL
SODIUM SERPL-SCNC: 137 MMOL/L
T4 FREE SERPL-MCNC: 0.99 NG/DL
TRIGL SERPL-MCNC: 38 MG/DL
TSH SERPL DL<=0.005 MIU/L-ACNC: 1 UIU/ML
VIT B12 SERPL-MCNC: 421 PG/ML
WBC # BLD AUTO: 3.31 K/UL
WBC # BLD AUTO: 3.31 K/UL

## 2018-09-13 PROCEDURE — 3079F DIAST BP 80-89 MM HG: CPT | Mod: CPTII,S$GLB,, | Performed by: FAMILY MEDICINE

## 2018-09-13 PROCEDURE — 83540 ASSAY OF IRON: CPT

## 2018-09-13 PROCEDURE — 99395 PREV VISIT EST AGE 18-39: CPT | Mod: S$GLB,,, | Performed by: FAMILY MEDICINE

## 2018-09-13 PROCEDURE — 80053 COMPREHEN METABOLIC PANEL: CPT

## 2018-09-13 PROCEDURE — 82746 ASSAY OF FOLIC ACID SERUM: CPT

## 2018-09-13 PROCEDURE — 85025 COMPLETE CBC W/AUTO DIFF WBC: CPT

## 2018-09-13 PROCEDURE — 83036 HEMOGLOBIN GLYCOSYLATED A1C: CPT

## 2018-09-13 PROCEDURE — 80061 LIPID PANEL: CPT

## 2018-09-13 PROCEDURE — 36415 COLL VENOUS BLD VENIPUNCTURE: CPT | Mod: PO

## 2018-09-13 PROCEDURE — 84443 ASSAY THYROID STIM HORMONE: CPT

## 2018-09-13 PROCEDURE — 82607 VITAMIN B-12: CPT

## 2018-09-13 PROCEDURE — 3074F SYST BP LT 130 MM HG: CPT | Mod: CPTII,S$GLB,, | Performed by: FAMILY MEDICINE

## 2018-09-13 PROCEDURE — 82728 ASSAY OF FERRITIN: CPT

## 2018-09-13 PROCEDURE — 99999 PR PBB SHADOW E&M-EST. PATIENT-LVL IV: CPT | Mod: PBBFAC,,, | Performed by: FAMILY MEDICINE

## 2018-09-13 PROCEDURE — 84439 ASSAY OF FREE THYROXINE: CPT

## 2018-09-13 RX ORDER — HYDROCHLOROTHIAZIDE 12.5 MG/1
12.5 CAPSULE ORAL
COMMUNITY
End: 2019-03-15

## 2018-09-14 PROBLEM — R94.31 ABNORMAL ECG: Chronic | Status: ACTIVE | Noted: 2017-07-10

## 2018-09-14 PROBLEM — I42.2 HYPERTROPHIC CARDIOMYOPATHY: Chronic | Status: ACTIVE | Noted: 2017-07-25

## 2018-09-14 PROBLEM — M17.12 OSTEOARTHRITIS OF LEFT KNEE: Status: ACTIVE | Noted: 2018-09-14

## 2018-09-14 PROBLEM — I10 ESSENTIAL HYPERTENSION: Chronic | Status: ACTIVE | Noted: 2017-08-23

## 2018-09-14 NOTE — PROGRESS NOTES
Subjective:   Patient ID: Kurt Shelley is a 33 y.o. male.    Chief Complaint: Annual Exam      HPI  34 yo male here for annual exam. Asymptomatic.     Patient queried and denies any further complaints    PREVENTIVE MED  Diet  Exercise  Colorectal Ca  Alcohol use  Tobacco  BP  Depression  Type 2 DM  Hep C  STD  Vision  ALL REVIEWED      PAST MEDICAL HISTORY:  Past Medical History:   Diagnosis Date    Allergy     Asthma     Family history of sudden cardiac death in brother 8/23/2017       PAST SURGICAL HISTORY:  History reviewed. No pertinent surgical history.    SOCIAL HISTORY:  Social History     Socioeconomic History    Marital status:      Spouse name: Not on file    Number of children: Not on file    Years of education: Not on file    Highest education level: Not on file   Social Needs    Financial resource strain: Not on file    Food insecurity - worry: Not on file    Food insecurity - inability: Not on file    Transportation needs - medical: Not on file    Transportation needs - non-medical: Not on file   Occupational History    Not on file   Tobacco Use    Smoking status: Never Smoker    Smokeless tobacco: Never Used   Substance and Sexual Activity    Alcohol use: Yes     Alcohol/week: 2.4 oz     Types: 3 Shots of liquor, 1 Standard drinks or equivalent per week    Drug use: No    Sexual activity: Not on file   Other Topics Concern    Not on file   Social History Narrative    Not on file       FAMILY HISTORY:  Family History   Problem Relation Age of Onset    Diabetes Mother     Hypertension Mother     Early death Brother 23        chf, Sleep apnea    Cancer Paternal Grandfather 78        colon ca and prostate ca       ALLERGIES AND MEDICATIONS: updated and reviewed.  Review of patient's allergies indicates:   Allergen Reactions    No known drug allergies        Current Outpatient Medications:     chlorthalidone (HYGROTEN) 25 MG Tab, Take 1/2 tablet (12.5mg) by mouth  "daily, Disp: 45 tablet, Rfl: 3    hydroCHLOROthiazide (MICROZIDE) 12.5 mg capsule, Take 12.5 mg by mouth., Disp: , Rfl:     Review of Systems   Constitutional: Negative for activity change, appetite change, chills, diaphoresis, fatigue, fever and unexpected weight change.   HENT: Negative for congestion, ear discharge, ear pain, facial swelling, hearing loss, nosebleeds, postnasal drip, rhinorrhea, sinus pressure, sneezing, sore throat, tinnitus, trouble swallowing and voice change.    Eyes: Negative for photophobia, pain, discharge, redness, itching and visual disturbance.   Respiratory: Negative for cough, chest tightness, shortness of breath and wheezing.    Cardiovascular: Negative for chest pain, palpitations and leg swelling.   Gastrointestinal: Negative for abdominal distention, abdominal pain, anal bleeding, blood in stool, constipation, diarrhea, nausea, rectal pain and vomiting.   Endocrine: Negative for cold intolerance, heat intolerance, polydipsia, polyphagia and polyuria.   Genitourinary: Negative for difficulty urinating, dysuria and flank pain.   Musculoskeletal: Negative for arthralgias, back pain, joint swelling, myalgias and neck pain.   Skin: Negative for pallor and rash.   Allergic/Immunologic: Negative for food allergies and immunocompromised state.   Neurological: Negative for dizziness, tremors, seizures, syncope, speech difficulty, weakness, light-headedness, numbness and headaches.   Hematological: Negative for adenopathy. Does not bruise/bleed easily.   Psychiatric/Behavioral: Negative for behavioral problems, confusion, decreased concentration, dysphoric mood, sleep disturbance and suicidal ideas. The patient is not nervous/anxious and is not hyperactive.        Objective:     Vitals:    09/13/18 0819   BP: 127/89   Pulse: 68   Resp: 18   Temp: 98.2 °F (36.8 °C)   TempSrc: Oral   Weight: 90.6 kg (199 lb 11.8 oz)   Height: 6' 2" (1.88 m)   PainSc: 0-No pain     Body mass index is 25.64 " kg/m².    Physical Exam   Constitutional: He is oriented to person, place, and time. He appears well-developed and well-nourished. He is cooperative. He does not have a sickly appearance. No distress.   HENT:   Head: Normocephalic and atraumatic.   Right Ear: Hearing, tympanic membrane, external ear and ear canal normal. No tenderness.   Left Ear: Hearing, tympanic membrane, external ear and ear canal normal. No tenderness.   Nose: Nose normal.   Mouth/Throat: Oropharynx is clear and moist.   Eyes: Conjunctivae and lids are normal. Pupils are equal, round, and reactive to light. Right eye exhibits no discharge. Left eye exhibits no discharge. Right conjunctiva is not injected. Left conjunctiva is not injected. No scleral icterus. Right eye exhibits normal extraocular motion. Left eye exhibits normal extraocular motion.   Neck: Normal range of motion. Neck supple. No JVD present. Carotid bruit is not present. No tracheal deviation and no edema present. No thyromegaly present.   Cardiovascular: Normal rate, regular rhythm, normal heart sounds and normal pulses. Exam reveals no friction rub.   No murmur heard.  Pulmonary/Chest: Effort normal and breath sounds normal. No accessory muscle usage. No respiratory distress. He has no wheezes. He has no rhonchi. He has no rales.   Abdominal: Soft. Bowel sounds are normal. He exhibits no distension, no abdominal bruit, no pulsatile midline mass and no mass. There is no hepatosplenomegaly. There is no tenderness. There is no rebound, no guarding, no CVA tenderness, no tenderness at McBurney's point and negative Holly's sign.   Musculoskeletal: He exhibits no edema.   Lymphadenopathy:        Head (right side): No submandibular, no preauricular and no posterior auricular adenopathy present.        Head (left side): No submandibular, no preauricular and no posterior auricular adenopathy present.     He has no cervical adenopathy.   Neurological: He is alert and oriented to  person, place, and time. GCS eye subscore is 4. GCS verbal subscore is 5. GCS motor subscore is 6.   Skin: Skin is warm and dry. No ecchymosis and no rash noted. Rash is not maculopapular and not urticarial. He is not diaphoretic. No cyanosis or erythema. Nails show no clubbing.   Psychiatric: He has a normal mood and affect. His speech is normal and behavior is normal. Thought content normal. His mood appears not anxious. His affect is not angry and not inappropriate. He does not exhibit a depressed mood.   Nursing note and vitals reviewed.      Assessment and Plan:   Kurt Ponce was seen today for annual exam.    Diagnoses and all orders for this visit:    Routine general medical examination at a health care facility  -     CBC auto differential; Future  -     Comprehensive metabolic panel; Future  -     Hemoglobin A1c; Future  -     Lipid panel; Future  -     TSH; Future  -     T4, free; Future    Essential hypertension  Comments:  BP at goal <130/80. SE with amlodipine. Stop amlodipine and HCTZ. Start chlortalidone 25mg and lisinopril 10mg. Rpt bmp 2wks    Hypertrophic cardiomyopathy    Osteoarthritis of left knee, unspecified osteoarthritis type  -     Ambulatory Referral to Orthopedics    Allergic rhinitis due to pollen, unspecified seasonality    Cardiomyopathy, unspecified type  -     Ambulatory consult to Cardiology  -     2D Echo w/ Color Flow Doppler; Future    Abnormal ECG        Follow-up in about 6 months (around 3/13/2019).        THIS NOTE WILL BE SHARED WITH THE PATIENT.

## 2019-03-15 ENCOUNTER — LAB VISIT (OUTPATIENT)
Dept: LAB | Facility: HOSPITAL | Age: 34
End: 2019-03-15
Attending: INTERNAL MEDICINE
Payer: COMMERCIAL

## 2019-03-15 ENCOUNTER — OFFICE VISIT (OUTPATIENT)
Dept: CARDIOLOGY | Facility: CLINIC | Age: 34
End: 2019-03-15
Payer: COMMERCIAL

## 2019-03-15 VITALS
HEIGHT: 74 IN | HEART RATE: 72 BPM | BODY MASS INDEX: 25.49 KG/M2 | SYSTOLIC BLOOD PRESSURE: 136 MMHG | WEIGHT: 198.63 LBS | DIASTOLIC BLOOD PRESSURE: 86 MMHG

## 2019-03-15 DIAGNOSIS — I10 ESSENTIAL HYPERTENSION: ICD-10-CM

## 2019-03-15 DIAGNOSIS — I42.2 HYPERTROPHIC CARDIOMYOPATHY: Primary | Chronic | ICD-10-CM

## 2019-03-15 LAB
ANION GAP SERPL CALC-SCNC: 5 MMOL/L
BUN SERPL-MCNC: 11 MG/DL
CALCIUM SERPL-MCNC: 9.8 MG/DL
CHLORIDE SERPL-SCNC: 103 MMOL/L
CO2 SERPL-SCNC: 29 MMOL/L
CREAT SERPL-MCNC: 1.1 MG/DL
EST. GFR  (AFRICAN AMERICAN): >60 ML/MIN/1.73 M^2
EST. GFR  (NON AFRICAN AMERICAN): >60 ML/MIN/1.73 M^2
GLUCOSE SERPL-MCNC: 92 MG/DL
POTASSIUM SERPL-SCNC: 4.6 MMOL/L
SODIUM SERPL-SCNC: 137 MMOL/L

## 2019-03-15 PROCEDURE — 99214 PR OFFICE/OUTPT VISIT, EST, LEVL IV, 30-39 MIN: ICD-10-PCS | Mod: S$GLB,,, | Performed by: INTERNAL MEDICINE

## 2019-03-15 PROCEDURE — 36415 COLL VENOUS BLD VENIPUNCTURE: CPT | Mod: PO

## 2019-03-15 PROCEDURE — 3079F PR MOST RECENT DIASTOLIC BLOOD PRESSURE 80-89 MM HG: ICD-10-PCS | Mod: CPTII,S$GLB,, | Performed by: INTERNAL MEDICINE

## 2019-03-15 PROCEDURE — 3075F SYST BP GE 130 - 139MM HG: CPT | Mod: CPTII,S$GLB,, | Performed by: INTERNAL MEDICINE

## 2019-03-15 PROCEDURE — 99999 PR PBB SHADOW E&M-EST. PATIENT-LVL III: CPT | Mod: PBBFAC,,, | Performed by: INTERNAL MEDICINE

## 2019-03-15 PROCEDURE — 3008F BODY MASS INDEX DOCD: CPT | Mod: CPTII,S$GLB,, | Performed by: INTERNAL MEDICINE

## 2019-03-15 PROCEDURE — 3079F DIAST BP 80-89 MM HG: CPT | Mod: CPTII,S$GLB,, | Performed by: INTERNAL MEDICINE

## 2019-03-15 PROCEDURE — 93000 EKG 12-LEAD: ICD-10-PCS | Mod: S$GLB,,, | Performed by: INTERNAL MEDICINE

## 2019-03-15 PROCEDURE — 93000 ELECTROCARDIOGRAM COMPLETE: CPT | Mod: S$GLB,,, | Performed by: INTERNAL MEDICINE

## 2019-03-15 PROCEDURE — 80048 BASIC METABOLIC PNL TOTAL CA: CPT

## 2019-03-15 PROCEDURE — 3075F PR MOST RECENT SYSTOLIC BLOOD PRESS GE 130-139MM HG: ICD-10-PCS | Mod: CPTII,S$GLB,, | Performed by: INTERNAL MEDICINE

## 2019-03-15 PROCEDURE — 99999 PR PBB SHADOW E&M-EST. PATIENT-LVL III: ICD-10-PCS | Mod: PBBFAC,,, | Performed by: INTERNAL MEDICINE

## 2019-03-15 PROCEDURE — 3008F PR BODY MASS INDEX (BMI) DOCUMENTED: ICD-10-PCS | Mod: CPTII,S$GLB,, | Performed by: INTERNAL MEDICINE

## 2019-03-15 PROCEDURE — 99214 OFFICE O/P EST MOD 30 MIN: CPT | Mod: S$GLB,,, | Performed by: INTERNAL MEDICINE

## 2019-03-15 RX ORDER — LISINOPRIL 10 MG/1
5 TABLET ORAL DAILY
Refills: 1 | COMMUNITY
Start: 2019-01-03 | End: 2019-05-06 | Stop reason: SDUPTHER

## 2019-03-15 NOTE — PROGRESS NOTES
Subjective:     Patient ID:  Kurt Shelley is a 33 y.o. male who presents for evaluation of Cardiomyopathy      Problem List:      HPI:     Kurt Shelley has a cardiomyopathy consistiing of increased LV wall thickening and mid cavity obliteration. Echo revealed increased myocardial density but there was no delayed hyper enhancement on MRI. ECG Has deep T-wave abnormality in the inferolateral leads with particularly deep inversion in the lateral precordial leads. Genetic testing was unrevealing.  He   He does not report angina or shortness of breath with exertion.  A brother  from heart failure.      Review of Systems   Constitution: Negative for weakness, malaise/fatigue, weight gain and weight loss.   HENT: Negative for hearing loss and nosebleeds.    Eyes: Negative for visual disturbance.   Cardiovascular: Negative for chest pain, claudication, dyspnea on exertion, irregular heartbeat, leg swelling, orthopnea, palpitations, paroxysmal nocturnal dyspnea and syncope.   Respiratory: Negative for cough, hemoptysis, sputum production and wheezing.    Hematologic/Lymphatic: Does not bruise/bleed easily.   Musculoskeletal: Negative for arthritis, back pain, falls, joint pain, muscle cramps, muscle weakness and myalgias.   Gastrointestinal: Negative for abdominal pain, heartburn and melena.   Genitourinary: Negative for frequency, hematuria and nocturia.   Neurological: Negative for dizziness, headaches, light-headedness, loss of balance, numbness and paresthesias.   Psychiatric/Behavioral: Negative for depression. The patient is not nervous/anxious.          Current Outpatient Medications   Medication Sig    lisinopril 10 MG tablet Take 5 mg by mouth once daily.    chlorthalidone (HYGROTEN) 25 MG Tab Take 1/2 tablet (12.5mg) by mouth daily (Patient taking differently: Take 12.5 mg by mouth. 1/2 tablet 2-3 days per week)           Past Medical History:   Diagnosis Date    Allergy     Asthma     Family  "history of sudden cardiac death in brother 8/23/2017      History reviewed. No pertinent surgical history.      Social History     Tobacco Use    Smoking status: Never Smoker    Smokeless tobacco: Never Used   Substance Use Topics    Alcohol use: Yes     Alcohol/week: 2.4 oz     Types: 3 Shots of liquor, 1 Standard drinks or equivalent per week    Drug use: No         Family History   Problem Relation Age of Onset    Diabetes Mother     Hypertension Mother     Early death Brother 23        chf, Sleep apnea    Cancer Paternal Grandfather 78        colon ca and prostate ca         Objective:     Physical Exam   Constitutional: He is oriented to person, place, and time. He appears well-developed and well-nourished.   /86   Pulse 72   Ht 6' 2" (1.88 m)   Wt 90.1 kg (198 lb 10.2 oz)   BMI 25.50 kg/m²      HENT:   Head: Normocephalic and atraumatic.   Neck: No JVD present. Carotid bruit is not present.   Cardiovascular: Normal rate, regular rhythm, S1 normal and S2 normal. Exam reveals no gallop.   No murmur (with or without Valsalva and hand ) heard.  Pulses:       Radial pulses are 2+ on the right side, and 2+ on the left side.   Pulmonary/Chest: Effort normal. He has no wheezes. He has no rales. Chest wall is not dull to percussion.   Abdominal: Soft. There is no splenomegaly or hepatomegaly. There is no tenderness.   Musculoskeletal:        Right lower leg: He exhibits no edema.        Left lower leg: He exhibits no edema.   Neurological: He is alert and oriented to person, place, and time. Gait normal.   Skin: Skin is warm and dry. No bruising noted. No cyanosis. Nails show no clubbing.   Psychiatric: He has a normal mood and affect. His speech is normal and behavior is normal. Judgment and thought content normal. Cognition and memory are normal.         Lab Results   Component Value Date    CHOL 187 09/13/2018    CHOL 193 06/29/2017    CHOL 187 05/12/2016     Lab Results   Component Value Date "    HDL 66 09/13/2018    HDL 60 06/29/2017    HDL 64 05/12/2016     Lab Results   Component Value Date    LDLCALC 113.4 09/13/2018    LDLCALC 125.4 06/29/2017    LDLCALC 115.8 05/12/2016     Lab Results   Component Value Date    TRIG 38 09/13/2018    TRIG 38 06/29/2017    TRIG 36 05/12/2016     Lab Results   Component Value Date    CHOLHDL 35.3 09/13/2018    CHOLHDL 31.1 06/29/2017    CHOLHDL 34.2 05/12/2016     Lab Results   Component Value Date    ALT 15 09/13/2018     Lab Results   Component Value Date    ALT 15 09/13/2018    AST 21 09/13/2018    ALKPHOS 58 09/13/2018    BILITOT 0.6 09/13/2018      Lab Results   Component Value Date    CREATININE 1.1 03/15/2019    BUN 11 03/15/2019     03/15/2019    K 4.6 03/15/2019     03/15/2019    CO2 29 03/15/2019     Echo 7/17:    1 - Normal left ventricular systolic function (EF 65-70%).     2 - No wall motion abnormalities.     3 - Concentric hypertrophy.  Septum 1.3 cm, posterior wall 1.4 cm in thickness.    4 - There is a borderline increased density to the myocardium, raising a  possibility of a myocardial infiltrative process (clinical correlation required).     5 - Normal left ventricular diastolic function.     6 - Right atrial enlargement.     7 - Right ventricle is upper limit of normal in size with hypertrophy, with normal systolic function.     8 - Trivial mitral regurgitation.     9 - Trivial to mild tricuspid regurgitation.     10 - Trivial to mild pulmonic regurgitation.     11 - The estimated PA systolic pressure is 30 mmHg.   MRI 7/17:  Upper limits of LV mass with maximum wall thickness of 12mm. There is cavity obliteration noted of the mid LV to apex with systole with myocardial crypts noted in the basal anterolateral and inferolateral walls. No   DHE noted. At this time, unable to fit the definition of a specific CM.     ECG today reviewed by me. It reveals sinus rhythm with T-wave inversions in the inferolateral leads.      Assessment and  Plan:     1. Hypertrophic cardiomyopathy    2. Essential hypertension      He has a cardiomyopathy that predates the hypertension.  Long conversation about ACE-I and the possible risk of cancer. Recommend waiting a few more months before switching to an angiotensin receptor blocker.    Follow-up in about 6 months (around 9/15/2019).

## 2019-03-15 NOTE — PATIENT INSTRUCTIONS
Let us wait till the end of July before deciding further to switch your lisinopril over to valsartan, losartan or irbesartan.    Stay away from salt

## 2019-03-29 DIAGNOSIS — I42.2 HYPERTROPHIC CARDIOMYOPATHY: Primary | ICD-10-CM

## 2019-03-29 DIAGNOSIS — I10 ESSENTIAL HYPERTENSION: ICD-10-CM

## 2019-05-04 DIAGNOSIS — I10 ESSENTIAL HYPERTENSION: ICD-10-CM

## 2019-05-06 ENCOUNTER — NURSE TRIAGE (OUTPATIENT)
Dept: ADMINISTRATIVE | Facility: CLINIC | Age: 34
End: 2019-05-06

## 2019-05-06 RX ORDER — LISINOPRIL 10 MG/1
5 TABLET ORAL DAILY
Qty: 30 TABLET | Refills: 1 | Status: SHIPPED | OUTPATIENT
Start: 2019-05-06 | End: 2019-05-29 | Stop reason: SDUPTHER

## 2019-05-06 RX ORDER — LISINOPRIL 10 MG/1
TABLET ORAL
Qty: 90 TABLET | Refills: 0 | OUTPATIENT
Start: 2019-05-06

## 2019-05-07 ENCOUNTER — NURSE TRIAGE (OUTPATIENT)
Dept: ADMINISTRATIVE | Facility: CLINIC | Age: 34
End: 2019-05-07

## 2019-05-07 NOTE — TELEPHONE ENCOUNTER
Patient called to report the following:     -out of bp med for the past couple of day   -30 day supply of Lisinopril sent to pharm       Reason for Disposition   Caller requesting a refill, no triage required, and triager able to refill per unit policy    Protocols used: MEDICATION QUESTION CALL-A-

## 2019-05-07 NOTE — TELEPHONE ENCOUNTER
Reason for Disposition   Caller has medication question only, adult not sick, and triager answers question    Protocols used: MEDICATION QUESTION CALL-A-AH    -Lisinopril is not a pharm   -Rx called to [facility]

## 2019-05-29 ENCOUNTER — PATIENT MESSAGE (OUTPATIENT)
Dept: CARDIOLOGY | Facility: CLINIC | Age: 34
End: 2019-05-29

## 2019-05-29 ENCOUNTER — PATIENT MESSAGE (OUTPATIENT)
Dept: INTERNAL MEDICINE | Facility: CLINIC | Age: 34
End: 2019-05-29

## 2019-05-29 DIAGNOSIS — I10 ESSENTIAL HYPERTENSION: ICD-10-CM

## 2019-05-29 RX ORDER — CHLORTHALIDONE 25 MG/1
TABLET ORAL
Qty: 45 TABLET | Refills: 3 | Status: CANCELLED | OUTPATIENT
Start: 2019-05-29

## 2019-05-29 RX ORDER — LISINOPRIL 10 MG/1
5 TABLET ORAL DAILY
Qty: 30 TABLET | Refills: 1 | Status: CANCELLED | OUTPATIENT
Start: 2019-05-29

## 2019-05-30 RX ORDER — LISINOPRIL 10 MG/1
5 TABLET ORAL DAILY
Qty: 30 TABLET | Refills: 6 | Status: SHIPPED | OUTPATIENT
Start: 2019-05-30 | End: 2019-08-18 | Stop reason: SDUPTHER

## 2019-05-30 RX ORDER — CHLORTHALIDONE 25 MG/1
TABLET ORAL
Qty: 45 TABLET | Refills: 3 | Status: SHIPPED | OUTPATIENT
Start: 2019-05-30 | End: 2019-09-26 | Stop reason: SDUPTHER

## 2019-07-21 DIAGNOSIS — I10 ESSENTIAL HYPERTENSION: ICD-10-CM

## 2019-07-25 RX ORDER — CHLORTHALIDONE 25 MG/1
TABLET ORAL
Qty: 90 TABLET | Refills: 0 | Status: SHIPPED | OUTPATIENT
Start: 2019-07-25 | End: 2020-07-28

## 2019-08-18 RX ORDER — LISINOPRIL 10 MG/1
TABLET ORAL
Qty: 30 TABLET | Refills: 11 | Status: SHIPPED | OUTPATIENT
Start: 2019-08-18 | End: 2019-09-26

## 2019-09-25 ENCOUNTER — LAB VISIT (OUTPATIENT)
Dept: LAB | Facility: HOSPITAL | Age: 34
End: 2019-09-25
Attending: INTERNAL MEDICINE
Payer: COMMERCIAL

## 2019-09-25 DIAGNOSIS — I42.2 HYPERTROPHIC CARDIOMYOPATHY: ICD-10-CM

## 2019-09-25 DIAGNOSIS — I10 ESSENTIAL HYPERTENSION: ICD-10-CM

## 2019-09-25 LAB
ALT SERPL W/O P-5'-P-CCNC: 11 U/L (ref 10–44)
ANION GAP SERPL CALC-SCNC: 7 MMOL/L (ref 8–16)
AST SERPL-CCNC: 20 U/L (ref 10–40)
BUN SERPL-MCNC: 24 MG/DL (ref 6–20)
CALCIUM SERPL-MCNC: 9.6 MG/DL (ref 8.7–10.5)
CHLORIDE SERPL-SCNC: 104 MMOL/L (ref 95–110)
CHOLEST SERPL-MCNC: 184 MG/DL (ref 120–199)
CHOLEST/HDLC SERPL: 2.8 {RATIO} (ref 2–5)
CO2 SERPL-SCNC: 28 MMOL/L (ref 23–29)
CREAT SERPL-MCNC: 1.3 MG/DL (ref 0.5–1.4)
EST. GFR  (AFRICAN AMERICAN): >60 ML/MIN/1.73 M^2
EST. GFR  (NON AFRICAN AMERICAN): >60 ML/MIN/1.73 M^2
GLUCOSE SERPL-MCNC: 93 MG/DL (ref 70–110)
HDLC SERPL-MCNC: 66 MG/DL (ref 40–75)
HDLC SERPL: 35.9 % (ref 20–50)
LDLC SERPL CALC-MCNC: 111.8 MG/DL (ref 63–159)
NONHDLC SERPL-MCNC: 118 MG/DL
POTASSIUM SERPL-SCNC: 4.1 MMOL/L (ref 3.5–5.1)
SODIUM SERPL-SCNC: 139 MMOL/L (ref 136–145)
TRIGL SERPL-MCNC: 31 MG/DL (ref 30–150)

## 2019-09-25 PROCEDURE — 36415 COLL VENOUS BLD VENIPUNCTURE: CPT | Mod: PO

## 2019-09-25 PROCEDURE — 80048 BASIC METABOLIC PNL TOTAL CA: CPT

## 2019-09-25 PROCEDURE — 84450 TRANSFERASE (AST) (SGOT): CPT

## 2019-09-25 PROCEDURE — 80061 LIPID PANEL: CPT

## 2019-09-25 PROCEDURE — 84460 ALANINE AMINO (ALT) (SGPT): CPT

## 2019-09-26 ENCOUNTER — OFFICE VISIT (OUTPATIENT)
Dept: CARDIOLOGY | Facility: CLINIC | Age: 34
End: 2019-09-26
Payer: COMMERCIAL

## 2019-09-26 VITALS
HEART RATE: 76 BPM | HEIGHT: 74 IN | WEIGHT: 196.63 LBS | SYSTOLIC BLOOD PRESSURE: 130 MMHG | DIASTOLIC BLOOD PRESSURE: 84 MMHG | BODY MASS INDEX: 25.23 KG/M2

## 2019-09-26 DIAGNOSIS — I42.2 HYPERTROPHIC CARDIOMYOPATHY: Primary | Chronic | ICD-10-CM

## 2019-09-26 DIAGNOSIS — R07.9 CHEST PAIN, UNSPECIFIED TYPE: ICD-10-CM

## 2019-09-26 DIAGNOSIS — I10 ESSENTIAL HYPERTENSION: Chronic | ICD-10-CM

## 2019-09-26 PROCEDURE — 3079F DIAST BP 80-89 MM HG: CPT | Mod: CPTII,S$GLB,, | Performed by: NURSE PRACTITIONER

## 2019-09-26 PROCEDURE — 99214 PR OFFICE/OUTPT VISIT, EST, LEVL IV, 30-39 MIN: ICD-10-PCS | Mod: S$GLB,,, | Performed by: NURSE PRACTITIONER

## 2019-09-26 PROCEDURE — 99999 PR PBB SHADOW E&M-EST. PATIENT-LVL III: ICD-10-PCS | Mod: PBBFAC,,, | Performed by: NURSE PRACTITIONER

## 2019-09-26 PROCEDURE — 3079F PR MOST RECENT DIASTOLIC BLOOD PRESSURE 80-89 MM HG: ICD-10-PCS | Mod: CPTII,S$GLB,, | Performed by: NURSE PRACTITIONER

## 2019-09-26 PROCEDURE — 99999 PR PBB SHADOW E&M-EST. PATIENT-LVL III: CPT | Mod: PBBFAC,,, | Performed by: NURSE PRACTITIONER

## 2019-09-26 PROCEDURE — 99214 OFFICE O/P EST MOD 30 MIN: CPT | Mod: S$GLB,,, | Performed by: NURSE PRACTITIONER

## 2019-09-26 PROCEDURE — 3008F PR BODY MASS INDEX (BMI) DOCUMENTED: ICD-10-PCS | Mod: CPTII,S$GLB,, | Performed by: NURSE PRACTITIONER

## 2019-09-26 PROCEDURE — 3075F SYST BP GE 130 - 139MM HG: CPT | Mod: CPTII,S$GLB,, | Performed by: NURSE PRACTITIONER

## 2019-09-26 PROCEDURE — 3075F PR MOST RECENT SYSTOLIC BLOOD PRESS GE 130-139MM HG: ICD-10-PCS | Mod: CPTII,S$GLB,, | Performed by: NURSE PRACTITIONER

## 2019-09-26 PROCEDURE — 3008F BODY MASS INDEX DOCD: CPT | Mod: CPTII,S$GLB,, | Performed by: NURSE PRACTITIONER

## 2019-09-26 RX ORDER — CANDESARTAN 8 MG/1
8 TABLET ORAL DAILY
Qty: 90 TABLET | Refills: 3 | Status: SHIPPED | OUTPATIENT
Start: 2019-09-26 | End: 2019-10-25

## 2019-09-26 NOTE — PROGRESS NOTES
Mr. Shelley is a patient of Dr. Mcbride and was last seen in [facility] Cardiology Visit 3/15/19.      Subjective:   Patient ID:  Kurt Shelley is a 34 y.o. male who presents for follow-up of Cardiomyopathy    Problems  HTN  Asthma  Hypertrophic cardiomyopathy  -questionable infiltrative hypertophy; cardiac MRI r/o  Family h/o early heart dz: brother CHF death at 23    HPI  Mr. Shelley is in clinic today for routine follow up.  Patient denies chest pain with exertion or at rest, palpitations, SOB, WAN, dizziness, syncope, edema, orthopnea, PND, or claudication.  Reports routine exercise.  He was seen by genetics in August of 2017 and the HCM genetics testing was negative.  His TTE in 2017 revealed normal systolic function.  The cardiac MRI in 2017 did not show any DHE to support an infiltrative process.  He is very active and runs about 15 miles a week.  Reports a couple of episodes of lightheadedness after running outside and not hydrating before doing lifting.  The other episode occurred after mowing the lawn in the heat.  He is on a thiazide diuretic and he noted these sx after starting the chlorthalidone.      Review of Systems   Constitution: Negative for decreased appetite, diaphoresis, malaise/fatigue, weight gain and weight loss.   Eyes: Negative for visual disturbance.   Cardiovascular: Positive for chest pain (sharp shooting pain lasting seconds). Negative for claudication, dyspnea on exertion, irregular heartbeat, leg swelling, near-syncope, orthopnea, palpitations, paroxysmal nocturnal dyspnea and syncope.        Denies chest pressure   Respiratory: Negative for cough, hemoptysis, shortness of breath, sleep disturbances due to breathing and snoring.    Endocrine: Negative for cold intolerance and heat intolerance.   Hematologic/Lymphatic: Negative for bleeding problem. Does not bruise/bleed easily.   Musculoskeletal: Negative for myalgias.   Gastrointestinal: Negative for bloating, abdominal pain, anorexia,  "change in bowel habit, constipation, diarrhea, nausea and vomiting.   Neurological: Negative for difficulty with concentration, disturbances in coordination, excessive daytime sleepiness, dizziness, headaches, light-headedness, loss of balance, numbness and weakness.   Psychiatric/Behavioral: The patient does not have insomnia.        Allergies and current medications updated and reviewed:  Review of patient's allergies indicates:   Allergen Reactions    No known drug allergies      Current Outpatient Medications   Medication Sig    chlorthalidone (HYGROTEN) 25 MG Tab Take 1/2 tablet (12.5mg) by mouth daily    chlorthalidone (HYGROTEN) 25 MG Tab TAKE 1 TABLET BY MOUTH ONCE DAILY    lisinopril 10 MG tablet TAKE 1/2 TABLET BY MOUTH DAILY     No current facility-administered medications for this visit.      Objective:        /84   Pulse 76   Ht 6' 2" (1.88 m)   Wt 89.2 kg (196 lb 10.4 oz)   BMI 25.25 kg/m²       Physical Exam   Constitutional: He is oriented to person, place, and time. Vital signs are normal. He appears well-developed and well-nourished. He is active. No distress.   HENT:   Head: Normocephalic and atraumatic.   Eyes: Conjunctivae and lids are normal. No scleral icterus.   Neck: Neck supple. Normal carotid pulses, no hepatojugular reflux and no JVD present. Carotid bruit is not present.   Cardiovascular: Normal rate, regular rhythm, S1 normal, S2 normal and intact distal pulses. PMI is not displaced. Exam reveals no gallop and no friction rub.   No murmur heard.  Pulses:       Carotid pulses are 2+ on the right side, and 2+ on the left side.       Radial pulses are 2+ on the right side, and 2+ on the left side.        Dorsalis pedis pulses are 2+ on the right side, and 2+ on the left side.        Posterior tibial pulses are 1+ on the right side, and 1+ on the left side.   Pulmonary/Chest: Effort normal and breath sounds normal. No respiratory distress. He has no decreased breath sounds. He " has no wheezes. He has no rhonchi. He has no rales. He exhibits no tenderness.   Abdominal: Soft. Normal appearance and bowel sounds are normal. He exhibits no distension, no fluid wave, no abdominal bruit, no ascites and no pulsatile midline mass. There is no hepatosplenomegaly. There is no tenderness.   Musculoskeletal: He exhibits no edema.   Neurological: He is alert and oriented to person, place, and time. Gait normal.   Skin: Skin is warm, dry and intact. No rash noted. He is not diaphoretic. Nails show no clubbing.   Psychiatric: He has a normal mood and affect. His speech is normal and behavior is normal. Judgment and thought content normal. Cognition and memory are normal.   Nursing note and vitals reviewed.      Chemistry        Component Value Date/Time     09/25/2019 0812    K 4.1 09/25/2019 0812     09/25/2019 0812    CO2 28 09/25/2019 0812    BUN 24 (H) 09/25/2019 0812    CREATININE 1.3 09/25/2019 0812    GLU 93 09/25/2019 0812        Component Value Date/Time    CALCIUM 9.6 09/25/2019 0812    ALKPHOS 58 09/13/2018 0905    AST 20 09/25/2019 0812    ALT 11 09/25/2019 0812    BILITOT 0.6 09/13/2018 0905    ESTGFRAFRICA >60.0 09/25/2019 0812    EGFRNONAA >60.0 09/25/2019 0812        Lab Results   Component Value Date    HGBA1C 5.8 (H) 09/13/2018         Recent Labs   Lab 09/13/18  0905 09/25/19  0812   WBC 3.31 L  3.31 L  --    Hemoglobin 14.7  14.7  --    Hematocrit 45.1  45.1  --    Mean Corpuscular Volume 87  87  --    Platelets 224  224  --    TSH 0.997  --    Cholesterol 187 184   HDL 66 66   LDL Cholesterol 113.4 111.8   Triglycerides 38 31   Hdl/Cholesterol Ratio 35.3 35.9              Test(s) Reviewed  I have reviewed the following in detail:  [] Stress test   [] Angiography   [x] Echocardiogram   [x] Labs   [x] Other:  Cardiac MRI       Assessment/Plan:   1. Hypertrophic cardiomyopathy  Asymptomatic.  Requested he make sure to exercise during one of the days that he is wearing  the monitor and to document it on the diary.  No murmur noted with or without valsalva maneuver.  Holter is to assess for arrhythmia/ectopy secondary to HCM.  Repeat TTE to evaluate for progression of HCM.     - Holter monitor - 48 hour; Future  - Echo; Future  - Comprehensive metabolic panel; Future  - Lipid panel; Future  - Magnesium; Future  - CBC Without Differential; Future    2. Essential hypertension  BP at goal <130/80.  ACEI have been associated with a small increased risk for lung CA. Switch ACEI to ARB.  Instructed to increase hydration as his labs show pre-renal azotemia and lightheadedness likely secondary to the chlorthalidone.      - candesartan (ATACAND) 8 MG tablet; Take 1 tablet (8 mg total) by mouth once daily.  Dispense: 90 tablet; Refill: 3    3. Sharp chest pain  Fleeting sharp pains in his left chest wall occurring randomly and lasting seconds are unlikely to be cardiac.  He's been having these pains for about 2-3 years now.      Patient was discussed with but not examined by Dr. Mcbride    Follow up in about 6 months (around 3/26/2020).

## 2019-09-26 NOTE — PATIENT INSTRUCTIONS
Make a follow up with Dutch Wyatt in genetics    LDL - bad type - improves with diet and medications: typically statins; most other medications that lower LDL have not been proven to prevent heart attacks.  May not improve significantly with exercise alone.  Ideally less than 100 mg/dl. If you have coronary artery disease, this should be well below 70 mg/dl.    HDL - good type - improves with exercise.  Ideally greater than 50 mg/dl.    TGs (triglycerides) - also bad - can change very quickly and considerably with certain foods. Improve with diet and exercise  In some cases a low carbohydrate diet will lower TGs better than a low fat diet.  Ideal range  mg/dl.    Sugar, fat and cholesterol in food:     A sensible diet that limits the intake of sugars, saturated (bad) fats and trans fats while increasing the intake of unsaturated (good)  fats and plant proteins is the basis of the current dietary recommendations.      We now recommend drastically reducing the intake of sugar. There is less emphasis on excluding fat.     Cholesterol in our food is no longer a significant concern, mainly because it is generally present in small amounts. However please do not confuse this with the role of cholesterol in our blood and arteries. Make no mistake, it is cholesterol that clogs up our arteries whether it comes from our food or is manufactured by our bodies.       Most foods that are high in cholesterol are also high in saturated fat. But there is way more saturated fat than cholesterol in these foods. So its the saturated fat content that matters more than cholesterol. On the other hand there are a handful of foods that are high in cholesterol but do not contain much saturated fat: eggs, shrimp, crab legs and crawfish are OK to eat.       Saturated fat is the bad fat - you should limit your intake of this. Deep fried foods, meats and other animal fats are high in saturated fat. Cookies, donuts and most dessert  and cakes are usually high in both saturated fat and sugar.       Unsaturated fat is the good fat. It contains the same number of calories as saturated fat but these fats do not get deposited in our arteries. The Mediterranean style diet encourages the intake of unsaturated fat - olive oil, avocado and unsalted nuts. So instead of baking a piece of fish, it may be better to pan-tavera it using olive oil.     You should eat a few servings of vegetables (and fruit as long as you are not diabetic) everyday. Substitute some plant proteins in place of meat: beans, lentils, quinoa and oatmeal. They are lean proteins.     Do not use stick butter or stick margarine. Butter that comes in a tub is soft butter. It consists of 1/2 butter and 1/2 vegetable oil., either canola or olive oil It is fine to use that.       Trans fats should definitely be avoided. Most foods that are labelled as containing 0 gms of trans fat can still contain several hundred milligrams of trans fat: creamer, margarine, refrigerator dough, deep fried foods, ready made frosting, potato, corn and torilla chips, cakes, cookies, pie crusts and crackers containing shortening made with hydrogenated vegetable oil.

## 2019-10-11 ENCOUNTER — PATIENT OUTREACH (OUTPATIENT)
Dept: ADMINISTRATIVE | Facility: HOSPITAL | Age: 34
End: 2019-10-11

## 2019-10-11 NOTE — PROGRESS NOTES
Pre-visit chart review completed.  Immunizations reviewed and updated.    Patient due for the following    TETANUS VACCINE     Influenza Vaccine (1)

## 2019-10-25 ENCOUNTER — OFFICE VISIT (OUTPATIENT)
Dept: PRIMARY CARE CLINIC | Facility: CLINIC | Age: 34
End: 2019-10-25
Payer: COMMERCIAL

## 2019-10-25 VITALS
OXYGEN SATURATION: 98 % | HEIGHT: 74 IN | DIASTOLIC BLOOD PRESSURE: 74 MMHG | SYSTOLIC BLOOD PRESSURE: 120 MMHG | TEMPERATURE: 99 F | HEART RATE: 86 BPM | BODY MASS INDEX: 25.75 KG/M2 | WEIGHT: 200.63 LBS

## 2019-10-25 DIAGNOSIS — Z00.00 ROUTINE MEDICAL EXAM: Primary | ICD-10-CM

## 2019-10-25 DIAGNOSIS — I10 ESSENTIAL HYPERTENSION: Chronic | ICD-10-CM

## 2019-10-25 DIAGNOSIS — R94.31 ABNORMAL ECG: Chronic | ICD-10-CM

## 2019-10-25 DIAGNOSIS — Z23 NEED FOR TDAP VACCINATION: ICD-10-CM

## 2019-10-25 DIAGNOSIS — I42.2 HYPERTROPHIC CARDIOMYOPATHY: Chronic | ICD-10-CM

## 2019-10-25 DIAGNOSIS — J30.1 ALLERGIC RHINITIS DUE TO POLLEN, UNSPECIFIED SEASONALITY: Chronic | ICD-10-CM

## 2019-10-25 DIAGNOSIS — M17.12 OSTEOARTHRITIS OF LEFT KNEE, UNSPECIFIED OSTEOARTHRITIS TYPE: ICD-10-CM

## 2019-10-25 PROBLEM — R07.9 CHEST PAIN OF UNKNOWN ETIOLOGY: Status: RESOLVED | Noted: 2017-07-10 | Resolved: 2019-10-25

## 2019-10-25 PROCEDURE — 3078F DIAST BP <80 MM HG: CPT | Mod: CPTII,S$GLB,, | Performed by: FAMILY MEDICINE

## 2019-10-25 PROCEDURE — 90715 TDAP VACCINE 7 YRS/> IM: CPT | Mod: S$GLB,,, | Performed by: FAMILY MEDICINE

## 2019-10-25 PROCEDURE — 90471 IMMUNIZATION ADMIN: CPT | Mod: S$GLB,,, | Performed by: FAMILY MEDICINE

## 2019-10-25 PROCEDURE — 99395 PREV VISIT EST AGE 18-39: CPT | Mod: 25,S$GLB,, | Performed by: FAMILY MEDICINE

## 2019-10-25 PROCEDURE — 3078F PR MOST RECENT DIASTOLIC BLOOD PRESSURE < 80 MM HG: ICD-10-PCS | Mod: CPTII,S$GLB,, | Performed by: FAMILY MEDICINE

## 2019-10-25 PROCEDURE — 3074F SYST BP LT 130 MM HG: CPT | Mod: CPTII,S$GLB,, | Performed by: FAMILY MEDICINE

## 2019-10-25 PROCEDURE — 99395 PR PREVENTIVE VISIT,EST,18-39: ICD-10-PCS | Mod: 25,S$GLB,, | Performed by: FAMILY MEDICINE

## 2019-10-25 PROCEDURE — 3074F PR MOST RECENT SYSTOLIC BLOOD PRESSURE < 130 MM HG: ICD-10-PCS | Mod: CPTII,S$GLB,, | Performed by: FAMILY MEDICINE

## 2019-10-25 PROCEDURE — 99999 PR PBB SHADOW E&M-EST. PATIENT-LVL III: CPT | Mod: PBBFAC,,, | Performed by: FAMILY MEDICINE

## 2019-10-25 PROCEDURE — 90715 TDAP VACCINE GREATER THAN OR EQUAL TO 7YO IM: ICD-10-PCS | Mod: S$GLB,,, | Performed by: FAMILY MEDICINE

## 2019-10-25 PROCEDURE — 90471 TDAP VACCINE GREATER THAN OR EQUAL TO 7YO IM: ICD-10-PCS | Mod: S$GLB,,, | Performed by: FAMILY MEDICINE

## 2019-10-25 PROCEDURE — 99999 PR PBB SHADOW E&M-EST. PATIENT-LVL III: ICD-10-PCS | Mod: PBBFAC,,, | Performed by: FAMILY MEDICINE

## 2019-10-25 RX ORDER — CANDESARTAN 8 MG/1
8 TABLET ORAL DAILY
Qty: 30 TABLET | Refills: 1 | Status: SHIPPED | OUTPATIENT
Start: 2019-10-25 | End: 2019-11-07

## 2019-10-25 RX ORDER — LISINOPRIL 10 MG/1
TABLET ORAL
Refills: 10 | COMMUNITY
Start: 2019-10-10 | End: 2019-10-25

## 2019-10-25 NOTE — PROGRESS NOTES
Subjective:   Patient ID: Kurt Shelley is a 34 y.o. male.    Chief Complaint: Annual Exam      HPI  33 yo male with essential hypertension here for annual exam.   Asymptomatic    Patient queried and denies any further complaints.    PREVENTIVE MED  Diet  Exercise  Colorectal Ca  Alcohol use  Tobacco  BP  Depression  Type 2 DM  Hep C  STD  Vision  ALL REVIEWED    ALLERGIES AND MEDICATIONS: updated and reviewed.  Review of patient's allergies indicates:   Allergen Reactions    No known drug allergies        Current Outpatient Medications:     chlorthalidone (HYGROTEN) 25 MG Tab, TAKE 1 TABLET BY MOUTH ONCE DAILY, Disp: 90 tablet, Rfl: 0    candesartan (ATACAND) 8 MG tablet, Take 1 tablet (8 mg total) by mouth once daily., Disp: 30 tablet, Rfl: 1    Review of Systems   Constitutional: Negative for activity change, appetite change, chills, diaphoresis, fatigue, fever and unexpected weight change.   HENT: Negative for congestion, ear discharge, ear pain, facial swelling, hearing loss, nosebleeds, postnasal drip, rhinorrhea, sinus pressure, sneezing, sore throat, tinnitus, trouble swallowing and voice change.    Eyes: Negative for photophobia, pain, discharge, redness, itching and visual disturbance.   Respiratory: Negative for cough, chest tightness, shortness of breath and wheezing.    Cardiovascular: Negative for chest pain, palpitations and leg swelling.   Gastrointestinal: Negative for abdominal distention, abdominal pain, anal bleeding, blood in stool, constipation, diarrhea, nausea, rectal pain and vomiting.   Endocrine: Negative for cold intolerance, heat intolerance, polydipsia, polyphagia and polyuria.   Genitourinary: Negative for difficulty urinating, dysuria and flank pain.   Musculoskeletal: Negative for arthralgias, back pain, joint swelling, myalgias and neck pain.   Skin: Negative for rash.   Neurological: Negative for dizziness, tremors, seizures, syncope, speech difficulty, weakness,  "light-headedness, numbness and headaches.   Psychiatric/Behavioral: Negative for behavioral problems, confusion, decreased concentration, dysphoric mood, sleep disturbance and suicidal ideas. The patient is not nervous/anxious and is not hyperactive.        Objective:     Vitals:    10/25/19 0726 10/25/19 0747   BP: 134/72 120/74   Pulse: 86    Temp: 98.7 °F (37.1 °C)    TempSrc: Oral    SpO2: 98%    Weight: 91 kg (200 lb 9.9 oz)    Height: 6' 2" (1.88 m)    PainSc: 0-No pain      Body mass index is 25.76 kg/m².    Physical Exam   Constitutional: He is oriented to person, place, and time. He appears well-developed and well-nourished. He is cooperative. He does not have a sickly appearance. No distress.   HENT:   Head: Normocephalic and atraumatic.   Right Ear: Hearing, tympanic membrane, external ear and ear canal normal. No tenderness.   Left Ear: Hearing, tympanic membrane, external ear and ear canal normal. No tenderness.   Nose: Nose normal.   Mouth/Throat: Oropharynx is clear and moist.   Eyes: Pupils are equal, round, and reactive to light. Conjunctivae and lids are normal. Right eye exhibits no discharge. Left eye exhibits no discharge. Right conjunctiva is not injected. Left conjunctiva is not injected. No scleral icterus. Right eye exhibits normal extraocular motion. Left eye exhibits normal extraocular motion.   Neck: Normal range of motion. Neck supple. No JVD present. Carotid bruit is not present. No tracheal deviation and no edema present. No thyromegaly present.   Cardiovascular: Normal rate, regular rhythm, normal heart sounds and normal pulses. Exam reveals no friction rub.   No murmur heard.  Pulmonary/Chest: Effort normal and breath sounds normal. No accessory muscle usage. No respiratory distress. He has no wheezes. He has no rhonchi. He has no rales.   Abdominal: Soft. Bowel sounds are normal. He exhibits no distension, no abdominal bruit, no pulsatile midline mass and no mass. There is no " hepatosplenomegaly. There is no tenderness. There is no rebound, no guarding, no CVA tenderness, no tenderness at McBurney's point and negative Holly's sign.   Musculoskeletal: He exhibits no edema.   Lymphadenopathy:        Head (right side): No submandibular, no preauricular and no posterior auricular adenopathy present.        Head (left side): No submandibular, no preauricular and no posterior auricular adenopathy present.     He has no cervical adenopathy.   Neurological: He is alert and oriented to person, place, and time. GCS eye subscore is 4. GCS verbal subscore is 5. GCS motor subscore is 6.   Skin: Skin is warm and dry. No ecchymosis and no rash noted. Rash is not maculopapular and not urticarial. He is not diaphoretic. No cyanosis or erythema. Nails show no clubbing.   Psychiatric: He has a normal mood and affect. His speech is normal and behavior is normal. Thought content normal. His mood appears not anxious. His affect is not angry and not inappropriate. He does not exhibit a depressed mood.   Nursing note and vitals reviewed.      Assessment and Plan:   Kurt Ponce was seen today for annual exam.    Diagnoses and all orders for this visit:    Routine medical exam  -     CBC auto differential; Future  -     Comprehensive metabolic panel; Future  -     Hemoglobin A1c; Future  -     Lipid panel; Future  -     TSH; Future  -     Magnesium; Future    Hypertrophic cardiomyopathy    Essential hypertension  -     candesartan (ATACAND) 8 MG tablet; Take 1 tablet (8 mg total) by mouth once daily.    Need for Tdap vaccination  -     (In Office Administered) Tdap Vaccine    Abnormal ECG    Osteoarthritis of left knee, unspecified osteoarthritis type    Allergic rhinitis due to pollen, unspecified seasonality    Other orders  -     Cancel: Hypertension Digital Medicine (HDMP) Enrollment Order  -     Cancel: Hypertension Digital Medicine (HDMP): Assign Onboarding Questionnaires        Follow up in about 6 months  (around 4/25/2020).    THIS NOTE WILL BE SHARED WITH THE PATIENT.

## 2019-10-29 ENCOUNTER — LAB VISIT (OUTPATIENT)
Dept: LAB | Facility: HOSPITAL | Age: 34
End: 2019-10-29
Payer: COMMERCIAL

## 2019-10-29 DIAGNOSIS — Z00.00 ROUTINE MEDICAL EXAM: ICD-10-CM

## 2019-10-29 LAB
ALBUMIN SERPL BCP-MCNC: 3.9 G/DL (ref 3.5–5.2)
ALP SERPL-CCNC: 70 U/L (ref 55–135)
ALT SERPL W/O P-5'-P-CCNC: 17 U/L (ref 10–44)
ANION GAP SERPL CALC-SCNC: 6 MMOL/L (ref 8–16)
AST SERPL-CCNC: 23 U/L (ref 10–40)
BASOPHILS # BLD AUTO: 0.04 K/UL (ref 0–0.2)
BASOPHILS NFR BLD: 0.9 % (ref 0–1.9)
BILIRUB SERPL-MCNC: 0.4 MG/DL (ref 0.1–1)
BUN SERPL-MCNC: 19 MG/DL (ref 6–20)
CALCIUM SERPL-MCNC: 9.2 MG/DL (ref 8.7–10.5)
CHLORIDE SERPL-SCNC: 106 MMOL/L (ref 95–110)
CHOLEST SERPL-MCNC: 153 MG/DL (ref 120–199)
CHOLEST/HDLC SERPL: 3.1 {RATIO} (ref 2–5)
CO2 SERPL-SCNC: 28 MMOL/L (ref 23–29)
CREAT SERPL-MCNC: 1.2 MG/DL (ref 0.5–1.4)
DIFFERENTIAL METHOD: ABNORMAL
EOSINOPHIL # BLD AUTO: 0.1 K/UL (ref 0–0.5)
EOSINOPHIL NFR BLD: 2 % (ref 0–8)
ERYTHROCYTE [DISTWIDTH] IN BLOOD BY AUTOMATED COUNT: 13.7 % (ref 11.5–14.5)
EST. GFR  (AFRICAN AMERICAN): >60 ML/MIN/1.73 M^2
EST. GFR  (NON AFRICAN AMERICAN): >60 ML/MIN/1.73 M^2
ESTIMATED AVG GLUCOSE: 128 MG/DL (ref 68–131)
GLUCOSE SERPL-MCNC: 94 MG/DL (ref 70–110)
HBA1C MFR BLD HPLC: 6.1 % (ref 4–5.6)
HCT VFR BLD AUTO: 37.1 % (ref 40–54)
HDLC SERPL-MCNC: 49 MG/DL (ref 40–75)
HDLC SERPL: 32 % (ref 20–50)
HGB BLD-MCNC: 12 G/DL (ref 14–18)
IMM GRANULOCYTES # BLD AUTO: 0.01 K/UL (ref 0–0.04)
IMM GRANULOCYTES NFR BLD AUTO: 0.2 % (ref 0–0.5)
LDLC SERPL CALC-MCNC: 97.4 MG/DL (ref 63–159)
LYMPHOCYTES # BLD AUTO: 2 K/UL (ref 1–4.8)
LYMPHOCYTES NFR BLD: 44.5 % (ref 18–48)
MAGNESIUM SERPL-MCNC: 2.2 MG/DL (ref 1.6–2.6)
MCH RBC QN AUTO: 28 PG (ref 27–31)
MCHC RBC AUTO-ENTMCNC: 32.3 G/DL (ref 32–36)
MCV RBC AUTO: 87 FL (ref 82–98)
MONOCYTES # BLD AUTO: 0.4 K/UL (ref 0.3–1)
MONOCYTES NFR BLD: 9.5 % (ref 4–15)
NEUTROPHILS # BLD AUTO: 1.9 K/UL (ref 1.8–7.7)
NEUTROPHILS NFR BLD: 42.9 % (ref 38–73)
NONHDLC SERPL-MCNC: 104 MG/DL
NRBC BLD-RTO: 0 /100 WBC
PLATELET # BLD AUTO: 262 K/UL (ref 150–350)
PMV BLD AUTO: 10.8 FL (ref 9.2–12.9)
POTASSIUM SERPL-SCNC: 4 MMOL/L (ref 3.5–5.1)
PROT SERPL-MCNC: 7 G/DL (ref 6–8.4)
RBC # BLD AUTO: 4.29 M/UL (ref 4.6–6.2)
SODIUM SERPL-SCNC: 140 MMOL/L (ref 136–145)
TRIGL SERPL-MCNC: 33 MG/DL (ref 30–150)
TSH SERPL DL<=0.005 MIU/L-ACNC: 1.4 UIU/ML (ref 0.4–4)
WBC # BLD AUTO: 4.4 K/UL (ref 3.9–12.7)

## 2019-10-29 PROCEDURE — 36415 COLL VENOUS BLD VENIPUNCTURE: CPT | Mod: PN

## 2019-10-29 PROCEDURE — 83735 ASSAY OF MAGNESIUM: CPT

## 2019-10-29 PROCEDURE — 80061 LIPID PANEL: CPT

## 2019-10-29 PROCEDURE — 85025 COMPLETE CBC W/AUTO DIFF WBC: CPT

## 2019-10-29 PROCEDURE — 84443 ASSAY THYROID STIM HORMONE: CPT

## 2019-10-29 PROCEDURE — 83036 HEMOGLOBIN GLYCOSYLATED A1C: CPT

## 2019-10-29 PROCEDURE — 80053 COMPREHEN METABOLIC PANEL: CPT

## 2019-11-07 ENCOUNTER — TELEPHONE (OUTPATIENT)
Dept: PRIMARY CARE CLINIC | Facility: CLINIC | Age: 34
End: 2019-11-07

## 2019-11-07 RX ORDER — VALSARTAN 40 MG/1
40 TABLET ORAL DAILY
Qty: 90 TABLET | Refills: 1 | Status: SHIPPED | OUTPATIENT
Start: 2019-11-07 | End: 2020-05-20

## 2019-11-07 NOTE — TELEPHONE ENCOUNTER
----- Message from Ivy Blount sent at 11/7/2019  3:54 PM CST -----  Contact: Pt [phone]  The patient said the candesartan (ATACAND) 8 MG tablet is too costly. Please prescribe a less expensive alternative.    Pharmacy: [facility] #[id number] [place] [id number] AT [address] & [facility] [address] [phone] (Phone) [phone] (Fax)    Thank you

## 2019-12-11 NOTE — LETTER
May 26, 2017    Kurt Shelley  [address]  [place] [address]             [facility]  [address]  [facility] [address]  Phone: [phone] Dear Mr. Shelley:    We have tried to reach you by mychart unsuccessfully.    [facility] is committed to your overall health.  To help you get the most out of each of your visits, we will review your information to make sure you are up to date on all of your recommended tests and/or procedures.       Dr. Wili Molina has found that you may be due for pneumonia and tetanus immunizations.     If you have had any of the above done at another facility, please bring the records or information with you so that your record at [facility] will be complete.  If you would like to schedule any of these, please contact me.     If you are currently taking medication, please bring it with you to your appointment for review.     If you have any questions or concerns, please don't hesitate to call.    Thank you for letting us care for you,  Milvia Martines LPN Clinical Care Coordinator  [facility]  [phone]       
DISPLAY PLAN FREE TEXT

## 2020-03-16 ENCOUNTER — OFFICE VISIT (OUTPATIENT)
Dept: PRIMARY CARE CLINIC | Facility: CLINIC | Age: 35
End: 2020-03-16
Payer: COMMERCIAL

## 2020-03-16 ENCOUNTER — TELEPHONE (OUTPATIENT)
Dept: PRIMARY CARE CLINIC | Facility: CLINIC | Age: 35
End: 2020-03-16

## 2020-03-16 VITALS — TEMPERATURE: 99 F

## 2020-03-16 DIAGNOSIS — R50.9 FEVER, UNSPECIFIED FEVER CAUSE: Primary | ICD-10-CM

## 2020-03-16 DIAGNOSIS — J06.9 UPPER RESPIRATORY TRACT INFECTION, UNSPECIFIED TYPE: ICD-10-CM

## 2020-03-16 DIAGNOSIS — Z20.822: ICD-10-CM

## 2020-03-16 DIAGNOSIS — R05.9 COUGH: ICD-10-CM

## 2020-03-16 PROCEDURE — 99999 PR PBB SHADOW E&M-EST. PATIENT-LVL II: ICD-10-PCS | Mod: PBBFAC,,, | Performed by: FAMILY MEDICINE

## 2020-03-16 PROCEDURE — 87502 POCT INFLUENZA A/B MOLECULAR: ICD-10-PCS | Mod: QW,S$GLB,, | Performed by: FAMILY MEDICINE

## 2020-03-16 PROCEDURE — 99999 PR PBB SHADOW E&M-EST. PATIENT-LVL II: CPT | Mod: PBBFAC,,, | Performed by: FAMILY MEDICINE

## 2020-03-16 PROCEDURE — U0002 COVID-19 LAB TEST NON-CDC: HCPCS

## 2020-03-16 PROCEDURE — 99214 OFFICE O/P EST MOD 30 MIN: CPT | Mod: S$GLB,,, | Performed by: FAMILY MEDICINE

## 2020-03-16 PROCEDURE — 87502 INFLUENZA DNA AMP PROBE: CPT | Mod: QW,S$GLB,, | Performed by: FAMILY MEDICINE

## 2020-03-16 PROCEDURE — 99214 PR OFFICE/OUTPT VISIT, EST, LEVL IV, 30-39 MIN: ICD-10-PCS | Mod: S$GLB,,, | Performed by: FAMILY MEDICINE

## 2020-03-16 NOTE — TELEPHONE ENCOUNTER
----- Message from Joshua Dodson MD sent at 3/16/2020  2:35 PM CDT -----  Pt on sched today  Pt should be screened and perhaps rec'd for testing main campus   ----- Message -----  From: Joshua Dodson MD  Sent: 3/16/2020   2:34 PM CDT  To: [place] Staff    Pt should be screened and perhaps rec'd for testing main campus

## 2020-03-18 LAB
CTP QC/QA: YES
POC MOLECULAR INFLUENZA A AGN: NEGATIVE
POC MOLECULAR INFLUENZA B AGN: NEGATIVE

## 2020-03-20 ENCOUNTER — TELEPHONE (OUTPATIENT)
Dept: PRIMARY CARE CLINIC | Facility: CLINIC | Age: 35
End: 2020-03-20

## 2020-03-20 NOTE — PROGRESS NOTES
Subjective:   Patient ID: Kurt Shelley is a 34 y.o. male.    Chief Complaint: No chief complaint on file.      HPI  34-year-old male who is a  with likely exposures to corona virus.  With fevers, cough.  Myalgias.  Fatigue.  Patient queried and denies any further complaints.    LOCATION  DURATION  SEVERITY  QUALITY  TIMING  CAUSE  ASSOCIATED SYMPTOMS  MODIFIERS.    ALLERGIES AND MEDICATIONS: updated and reviewed.  Review of patient's allergies indicates:   Allergen Reactions    No known drug allergies        Current Outpatient Medications:     chlorthalidone (HYGROTEN) 25 MG Tab, TAKE 1 TABLET BY MOUTH ONCE DAILY, Disp: 90 tablet, Rfl: 0    valsartan (DIOVAN) 40 MG tablet, Take 1 tablet (40 mg total) by mouth once daily., Disp: 90 tablet, Rfl: 1    Review of Systems   Constitutional: Positive for diaphoresis, fatigue and fever. Negative for activity change, appetite change, chills and unexpected weight change.   HENT: Positive for congestion. Negative for ear discharge, ear pain, postnasal drip, rhinorrhea, sneezing and sore throat.    Eyes: Negative for photophobia and discharge.   Respiratory: Positive for cough and chest tightness. Negative for shortness of breath and wheezing.    Cardiovascular: Negative for chest pain and palpitations.   Gastrointestinal: Negative for abdominal distention, abdominal pain, diarrhea, nausea and vomiting.   Genitourinary: Negative for dysuria.   Musculoskeletal: Negative for arthralgias and neck pain.   Skin: Negative for rash.   Neurological: Negative for headaches.       Objective:     Vitals:    03/16/20 1549   Temp: 98.7 °F (37.1 °C)   TempSrc: Oral     There is no height or weight on file to calculate BMI.    Physical Exam   Constitutional: He appears well-developed and well-nourished.   HENT:   Head: Normocephalic and atraumatic.   Right Ear: Hearing, tympanic membrane, external ear and ear canal normal. No drainage or swelling. No decreased hearing is  noted.   Left Ear: Hearing, tympanic membrane, external ear and ear canal normal. No drainage or swelling. No decreased hearing is noted.   Nose: Nose normal. No rhinorrhea.   Mouth/Throat: Oropharynx is clear and moist. No oropharyngeal exudate, posterior oropharyngeal edema or posterior oropharyngeal erythema.   Eyes: Pupils are equal, round, and reactive to light. Conjunctivae, EOM and lids are normal. Right eye exhibits no discharge and no exudate. Left eye exhibits no discharge and no exudate. Right conjunctiva is not injected. Left conjunctiva is not injected.   Neck: Trachea normal and full passive range of motion without pain. Normal carotid pulses, no hepatojugular reflux and no JVD present. Carotid bruit is not present. No neck rigidity. No edema and no erythema present. No thyroid mass and no thyromegaly present.   Cardiovascular: Normal rate, regular rhythm and normal heart sounds.   Pulmonary/Chest: Effort normal. No respiratory distress.   Abdominal: Normal appearance. There is negative Holly's sign.   Lymphadenopathy:     He has no cervical adenopathy.   Neurological: He is alert.   Skin: Skin is warm and dry.   Psychiatric: He has a normal mood and affect. His speech is normal and behavior is normal.   Nursing note and vitals reviewed.      Assessment and Plan:   Diagnoses and all orders for this visit:    Fever, unspecified fever cause  -     SARS- CoV-2 (COVID-19) QUALITATIVE PCR  -     POCT Influenza A/B Molecular    Cough  -     SARS- CoV-2 (COVID-19) QUALITATIVE PCR  -     POCT Influenza A/B Molecular    Upper respiratory tract infection, unspecified type    Close Exposure to [facility] Coronavirus    Other orders  -     Cancel: SARS- CoV-2 (COVID-19) QUALITATIVE PCR    Hydrate, rest, OTC Mucinex Expectorant as directed, Nasal saline as needed.  OTC Zyrtec as directed.      No follow-ups on file.    THIS NOTE WILL BE SHARED WITH THE PATIENT.

## 2020-03-20 NOTE — TELEPHONE ENCOUNTER
Patient informed results have not been resulted and that he would be informed as soon as lab is resulted.

## 2020-03-20 NOTE — TELEPHONE ENCOUNTER
----- Message from Renee Weber sent at 3/20/2020 10:25 AM CDT -----  Type:  Needs Medical Advice    Who Called: MIGUELON       Would the patient rather a call back or a response via [facility]? CALL BACK     Best Call Back Number: [phone] [email or web address]         Additional Information: PT WOULD LIKE TO SPEAK WITH THE NURSE ABOUT GETTING HIS RECENT TEST RESULTS.

## 2020-03-23 ENCOUNTER — TELEPHONE (OUTPATIENT)
Dept: PRIMARY CARE CLINIC | Facility: CLINIC | Age: 35
End: 2020-03-23

## 2020-03-23 NOTE — TELEPHONE ENCOUNTER
----- Message from Mary Mackenzie sent at 3/23/2020 12:37 PM CDT -----  Contact: [phone]  Patient is requesting a call from the office regarding his Covid19 test results.    Please advise, thank you.

## 2020-03-27 LAB
SARS-COV-2 RNA RESP QL NAA+PROBE: DETECTED
SPECIMEN SOURCE: ABNORMAL

## 2020-05-13 DIAGNOSIS — U07.1 COVID-19 VIRUS DETECTED: Primary | ICD-10-CM

## 2020-05-20 RX ORDER — VALSARTAN 40 MG/1
TABLET ORAL
Qty: 90 TABLET | Refills: 1 | Status: SHIPPED | OUTPATIENT
Start: 2020-05-20 | End: 2020-09-03 | Stop reason: SDUPTHER

## 2020-08-26 ENCOUNTER — CLINICAL SUPPORT (OUTPATIENT)
Dept: CARDIOLOGY | Facility: CLINIC | Age: 35
End: 2020-08-26
Attending: NURSE PRACTITIONER
Payer: COMMERCIAL

## 2020-08-26 ENCOUNTER — LAB VISIT (OUTPATIENT)
Dept: LAB | Facility: HOSPITAL | Age: 35
End: 2020-08-26
Attending: NURSE PRACTITIONER
Payer: COMMERCIAL

## 2020-08-26 VITALS
WEIGHT: 200 LBS | DIASTOLIC BLOOD PRESSURE: 70 MMHG | SYSTOLIC BLOOD PRESSURE: 122 MMHG | HEIGHT: 74 IN | HEART RATE: 59 BPM | BODY MASS INDEX: 25.67 KG/M2

## 2020-08-26 DIAGNOSIS — I42.2 HYPERTROPHIC CARDIOMYOPATHY: Chronic | ICD-10-CM

## 2020-08-26 DIAGNOSIS — I42.2 HYPERTROPHIC CARDIOMYOPATHY: ICD-10-CM

## 2020-08-26 LAB
ALBUMIN SERPL BCP-MCNC: 4.5 G/DL (ref 3.5–5.2)
ALP SERPL-CCNC: 46 U/L (ref 55–135)
ALT SERPL W/O P-5'-P-CCNC: 13 U/L (ref 10–44)
ANION GAP SERPL CALC-SCNC: 8 MMOL/L (ref 8–16)
ASCENDING AORTA: 3.35 CM
AST SERPL-CCNC: 21 U/L (ref 10–40)
AV INDEX (PROSTH): 1
AV MEAN GRADIENT: 5 MMHG
AV PEAK GRADIENT: 8 MMHG
AV VALVE AREA: 4.07 CM2
AV VELOCITY RATIO: 0.94
BILIRUB SERPL-MCNC: 0.4 MG/DL (ref 0.1–1)
BSA FOR ECHO PROCEDURE: 2.18 M2
BUN SERPL-MCNC: 18 MG/DL (ref 6–20)
CALCIUM SERPL-MCNC: 9.7 MG/DL (ref 8.7–10.5)
CHLORIDE SERPL-SCNC: 102 MMOL/L (ref 95–110)
CHOLEST SERPL-MCNC: 177 MG/DL (ref 120–199)
CHOLEST/HDLC SERPL: 2.7 {RATIO} (ref 2–5)
CO2 SERPL-SCNC: 27 MMOL/L (ref 23–29)
CREAT SERPL-MCNC: 1.1 MG/DL (ref 0.5–1.4)
CV ECHO LV RWT: 0.5 CM
DOP CALC AO PEAK VEL: 1.45 M/S
DOP CALC AO VTI: 30.16 CM
DOP CALC LVOT AREA: 4.1 CM2
DOP CALC LVOT DIAMETER: 2.28 CM
DOP CALC LVOT PEAK VEL: 1.37 M/S
DOP CALC LVOT STROKE VOLUME: 122.63 CM3
DOP CALCLVOT PEAK VEL VTI: 30.05 CM
E WAVE DECELERATION TIME: 201.99 MSEC
E/A RATIO: 1.21
E/E' RATIO: 9.25 M/S
ECHO LV POSTERIOR WALL: 1.2 CM (ref 0.6–1.1)
ERYTHROCYTE [DISTWIDTH] IN BLOOD BY AUTOMATED COUNT: 13.7 % (ref 11.5–14.5)
EST. GFR  (AFRICAN AMERICAN): >60 ML/MIN/1.73 M^2
EST. GFR  (NON AFRICAN AMERICAN): >60 ML/MIN/1.73 M^2
FRACTIONAL SHORTENING: 39 % (ref 28–44)
GLUCOSE SERPL-MCNC: 105 MG/DL (ref 70–110)
HCT VFR BLD AUTO: 42 % (ref 40–54)
HDLC SERPL-MCNC: 66 MG/DL (ref 40–75)
HDLC SERPL: 37.3 % (ref 20–50)
HGB BLD-MCNC: 13.6 G/DL (ref 14–18)
INTERVENTRICULAR SEPTUM: 1.3 CM (ref 0.6–1.1)
IVRT: 114.18 MSEC
LA MAJOR: 5.46 CM
LA MINOR: 5.2 CM
LA WIDTH: 4.66 CM
LDLC SERPL CALC-MCNC: 104.2 MG/DL (ref 63–159)
LEFT ATRIUM SIZE: 4.27 CM
LEFT ATRIUM VOLUME INDEX: 41.5 ML/M2
LEFT ATRIUM VOLUME: 90.1 CM3
LEFT INTERNAL DIMENSION IN SYSTOLE: 2.93 CM (ref 2.1–4)
LEFT VENTRICLE DIASTOLIC VOLUME INDEX: 43.88 ML/M2
LEFT VENTRICLE DIASTOLIC VOLUME: 95.36 ML
LEFT VENTRICLE MASS INDEX: 107 G/M2
LEFT VENTRICLE SYSTOLIC VOLUME INDEX: 15.2 ML/M2
LEFT VENTRICLE SYSTOLIC VOLUME: 33.01 ML
LEFT VENTRICULAR INTERNAL DIMENSION IN DIASTOLE: 4.8 CM (ref 3.5–6)
LEFT VENTRICULAR MASS: 232.25 G
LV LATERAL E/E' RATIO: 7.4 M/S
LV SEPTAL E/E' RATIO: 12.33 M/S
MAGNESIUM SERPL-MCNC: 1.6 MG/DL (ref 1.6–2.6)
MCH RBC QN AUTO: 28.6 PG (ref 27–31)
MCHC RBC AUTO-ENTMCNC: 32.4 G/DL (ref 32–36)
MCV RBC AUTO: 88 FL (ref 82–98)
MV PEAK A VEL: 0.61 M/S
MV PEAK E VEL: 0.74 M/S
MV STENOSIS PRESSURE HALF TIME: 58.58 MS
MV VALVE AREA P 1/2 METHOD: 3.76 CM2
NONHDLC SERPL-MCNC: 111 MG/DL
PISA TR MAX VEL: 2.3 M/S
PLATELET # BLD AUTO: 195 K/UL (ref 150–350)
PMV BLD AUTO: 11.7 FL (ref 9.2–12.9)
POTASSIUM SERPL-SCNC: 4.2 MMOL/L (ref 3.5–5.1)
PROT SERPL-MCNC: 7.5 G/DL (ref 6–8.4)
PULM VEIN S/D RATIO: 0.93
PV PEAK D VEL: 0.56 M/S
PV PEAK S VEL: 0.52 M/S
RA MAJOR: 5.46 CM
RA PRESSURE: 8 MMHG
RA WIDTH: 3.29 CM
RBC # BLD AUTO: 4.75 M/UL (ref 4.6–6.2)
RIGHT VENTRICULAR END-DIASTOLIC DIMENSION: 3.55 CM
SINUS: 3.34 CM
SODIUM SERPL-SCNC: 137 MMOL/L (ref 136–145)
STJ: 2.98 CM
TDI LATERAL: 0.1 M/S
TDI SEPTAL: 0.06 M/S
TDI: 0.08 M/S
TR MAX PG: 21 MMHG
TRICUSPID ANNULAR PLANE SYSTOLIC EXCURSION: 1.81 CM
TRIGL SERPL-MCNC: 34 MG/DL (ref 30–150)
TV REST PULMONARY ARTERY PRESSURE: 29 MMHG
WBC # BLD AUTO: 2.66 K/UL (ref 3.9–12.7)

## 2020-08-26 PROCEDURE — 80053 COMPREHEN METABOLIC PANEL: CPT

## 2020-08-26 PROCEDURE — 93356 PR IMG, MYOCARDIAL STRAIN, SPECKLE TRACKING: ICD-10-PCS | Mod: S$GLB,,, | Performed by: INTERNAL MEDICINE

## 2020-08-26 PROCEDURE — 83735 ASSAY OF MAGNESIUM: CPT

## 2020-08-26 PROCEDURE — 36415 COLL VENOUS BLD VENIPUNCTURE: CPT | Mod: PO

## 2020-08-26 PROCEDURE — 93356 MYOCRD STRAIN IMG SPCKL TRCK: CPT | Mod: S$GLB,,, | Performed by: INTERNAL MEDICINE

## 2020-08-26 PROCEDURE — 80061 LIPID PANEL: CPT

## 2020-08-26 PROCEDURE — 93306 ECHO (CUPID ONLY): ICD-10-PCS | Mod: S$GLB,,, | Performed by: INTERNAL MEDICINE

## 2020-08-26 PROCEDURE — 85027 COMPLETE CBC AUTOMATED: CPT

## 2020-08-26 PROCEDURE — 93306 TTE W/DOPPLER COMPLETE: CPT | Mod: S$GLB,,, | Performed by: INTERNAL MEDICINE

## 2020-08-26 PROCEDURE — 99999 PR PBB SHADOW E&M-EST. PATIENT-LVL II: ICD-10-PCS | Mod: PBBFAC,,,

## 2020-08-26 PROCEDURE — 99999 PR PBB SHADOW E&M-EST. PATIENT-LVL II: CPT | Mod: PBBFAC,,,

## 2020-08-27 DIAGNOSIS — I42.2 HYPERTROPHIC CARDIOMYOPATHY: Primary | ICD-10-CM

## 2020-08-27 NOTE — PROGRESS NOTES
Mr. Shelley is a patient of Dr. Mcbride and was last seen in [facility] 8/26/2020.      Subjective:   Patient ID:  Kurt Shelley is a 35 y.o. male who presents for follow-up of Cardiomyopathy    Problems  HTN  Asthma  Hypertrophic cardiomyopathy  -questionable infiltrative hypertophy; cardiac MRI r/o  Family h/o early heart dz: brother CHF death at 23    HPI  Mr. Shelley is in clinic today for routine follow up.  Patient denies palpitations, SOB, WAN, dizziness, syncope, edema, orthopnea, PND, or claudication.  Reports riding his bike and running several miles several days a week without sx.  He completed holter monitor on 8/28 that did not reveal any arrhythmias.  He does get sharp chest pain at times usually when he's lying down but not when he exercises.     Review of Systems   Constitution: Negative for decreased appetite, diaphoresis, malaise/fatigue, weight gain and weight loss.   Eyes: Negative for visual disturbance.   Cardiovascular: Negative for chest pain, claudication, dyspnea on exertion, irregular heartbeat, leg swelling, near-syncope, orthopnea, palpitations, paroxysmal nocturnal dyspnea and syncope.        Denies chest pressure   Respiratory: Negative for cough, hemoptysis, shortness of breath, sleep disturbances due to breathing and snoring.    Endocrine: Negative for cold intolerance and heat intolerance.   Hematologic/Lymphatic: Negative for bleeding problem. Does not bruise/bleed easily.   Musculoskeletal: Negative for myalgias.   Gastrointestinal: Negative for bloating, abdominal pain, anorexia, change in bowel habit, constipation, diarrhea, nausea and vomiting.   Neurological: Negative for difficulty with concentration, disturbances in coordination, excessive daytime sleepiness, dizziness, headaches, light-headedness, loss of balance, numbness and weakness.   Psychiatric/Behavioral: The patient does not have insomnia.        Allergies and current medications updated and reviewed:  Review  "of patient's allergies indicates:   Allergen Reactions    No known drug allergies      Current Outpatient Medications   Medication Sig    chlorthalidone (HYGROTEN) 25 MG Tab TAKE 1/2 TABLET BY MOUTH DAILY    valsartan (DIOVAN) 40 MG tablet Take 1 tablet (40 mg total) by mouth once daily.     No current facility-administered medications for this visit.        Objective:          /73   Pulse 79   Ht 6' 2" (1.88 m)   Wt 90.5 kg (199 lb 8.3 oz)   BMI 25.62 kg/m²       Physical Exam   Constitutional: He is oriented to person, place, and time. Vital signs are normal. He appears well-developed and well-nourished. He is active. No distress.   HENT:   Head: Normocephalic and atraumatic.   Eyes: Conjunctivae and lids are normal. No scleral icterus.   Neck: Neck supple. Normal carotid pulses, no hepatojugular reflux and no JVD present. Carotid bruit is not present.   Cardiovascular: Normal rate, regular rhythm, S1 normal, S2 normal and intact distal pulses. PMI is not displaced. Exam reveals no gallop and no friction rub.   No murmur heard.  Pulses:       Carotid pulses are 2+ on the right side and 2+ on the left side.       Radial pulses are 2+ on the right side and 2+ on the left side.        Dorsalis pedis pulses are 2+ on the right side and 2+ on the left side.        Posterior tibial pulses are 1+ on the right side and 1+ on the left side.   Pulmonary/Chest: Effort normal and breath sounds normal. No respiratory distress. He has no decreased breath sounds. He has no wheezes. He has no rhonchi. He has no rales. He exhibits no tenderness.   Abdominal: Soft. Normal appearance and bowel sounds are normal. He exhibits no distension, no fluid wave, no abdominal bruit, no ascites and no pulsatile midline mass. There is no hepatosplenomegaly. There is no abdominal tenderness.   Musculoskeletal:         General: No edema.   Neurological: He is alert and oriented to person, place, and time. Gait normal.   Skin: Skin is " warm, dry and intact. No rash noted. He is not diaphoretic. Nails show no clubbing.   Psychiatric: He has a normal mood and affect. His speech is normal and behavior is normal. Judgment and thought content normal. Cognition and memory are normal.   Nursing note and vitals reviewed.      Chemistry        Component Value Date/Time     08/26/2020 1028    K 4.2 08/26/2020 1028     08/26/2020 1028    CO2 27 08/26/2020 1028    BUN 18 08/26/2020 1028    CREATININE 1.1 08/26/2020 1028     08/26/2020 1028        Component Value Date/Time    CALCIUM 9.7 08/26/2020 1028    ALKPHOS 46 (L) 08/26/2020 1028    AST 21 08/26/2020 1028    ALT 13 08/26/2020 1028    BILITOT 0.4 08/26/2020 1028    ESTGFRAFRICA >60.0 08/26/2020 1028    EGFRNONAA >60.0 08/26/2020 1028        Lab Results   Component Value Date    HGBA1C 6.1 (H) 10/29/2019     Recent Labs   Lab 10/29/19  0810 08/26/20  1028   WBC 4.40 2.66 L   Hemoglobin 12.0 L 13.6 L   Hematocrit 37.1 L 42.0   Mean Corpuscular Volume 87 88   Platelets 262 195   TSH 1.401  --    Cholesterol 153 177   HDL 49 66   LDL Cholesterol 97.4 104.2   Triglycerides 33 34   Hdl/Cholesterol Ratio 32.0 37.3              Test(s) Reviewed  I have reviewed the following in detail:  [] Stress test   [] Angiography   [x] Echocardiogram   [x] Labs   [] Other:         Assessment/Plan:   1. Hypertrophic cardiomyopathy  Mr. Shelley has a h/o hypertrophic cardiomyopathy.  His echo on 8/26 did not show any LVOT obstruction or KHUSHBOO.  He has a slightly low GLS of -15% and normal EF of 60%.  He was seen by genetics in 8/2017 but no genetic source was noted.  No infiltrative process noted on MRI in 2017.  Refer to Dr. Parekh in HF/Transplant to further evaluate cardiomyopathy.  ECG today SR with LVH changes but no significant changes.  Would like holter monitor or ETT every year.     - IN OFFICE EKG 12-LEAD (to [facility]); Future  - Exercise Stress - EKG; Future  - Ambulatory referral/consult to  Transplant, Heart; Future    2. Essential hypertension  BP at goal <130/80. Continue current regimen.    - valsartan (DIOVAN) 40 MG tablet; Take 1 tablet (40 mg total) by mouth once daily.  Dispense: 90 tablet; Refill: 1    3. Prediabetes  A1C 6.1 in October 2019    The patient was discussed and examined by Dr. Mcbride    Follow up in about 1 year (around 9/3/2021).

## 2020-08-28 ENCOUNTER — CLINICAL SUPPORT (OUTPATIENT)
Dept: CARDIOLOGY | Facility: CLINIC | Age: 35
End: 2020-08-28
Attending: NURSE PRACTITIONER
Payer: COMMERCIAL

## 2020-08-28 DIAGNOSIS — I42.2 HYPERTROPHIC CARDIOMYOPATHY: ICD-10-CM

## 2020-08-28 PROCEDURE — 93224 XTRNL ECG REC UP TO 48 HRS: CPT | Mod: S$GLB,,, | Performed by: INTERNAL MEDICINE

## 2020-08-28 PROCEDURE — 93224 HOLTER MONITOR - 48 HOUR (CUPID ONLY): ICD-10-PCS | Mod: S$GLB,,, | Performed by: INTERNAL MEDICINE

## 2020-09-02 ENCOUNTER — PATIENT OUTREACH (OUTPATIENT)
Dept: ADMINISTRATIVE | Facility: OTHER | Age: 35
End: 2020-09-02

## 2020-09-02 LAB
OHS CV EVENT MONITOR DAY: 0
OHS CV HOLTER LENGTH DECIMAL HOURS: 48
OHS CV HOLTER LENGTH HOURS: 48
OHS CV HOLTER LENGTH MINUTES: 0

## 2020-09-03 ENCOUNTER — OFFICE VISIT (OUTPATIENT)
Dept: CARDIOLOGY | Facility: CLINIC | Age: 35
End: 2020-09-03
Payer: COMMERCIAL

## 2020-09-03 VITALS
DIASTOLIC BLOOD PRESSURE: 73 MMHG | HEART RATE: 79 BPM | HEIGHT: 74 IN | BODY MASS INDEX: 25.6 KG/M2 | WEIGHT: 199.5 LBS | SYSTOLIC BLOOD PRESSURE: 124 MMHG

## 2020-09-03 DIAGNOSIS — I10 ESSENTIAL HYPERTENSION: Chronic | ICD-10-CM

## 2020-09-03 DIAGNOSIS — I42.2 HYPERTROPHIC CARDIOMYOPATHY: Primary | Chronic | ICD-10-CM

## 2020-09-03 DIAGNOSIS — R73.03 PREDIABETES: ICD-10-CM

## 2020-09-03 PROCEDURE — 99999 PR PBB SHADOW E&M-EST. PATIENT-LVL III: ICD-10-PCS | Mod: PBBFAC,,, | Performed by: NURSE PRACTITIONER

## 2020-09-03 PROCEDURE — 99999 PR PBB SHADOW E&M-EST. PATIENT-LVL III: CPT | Mod: PBBFAC,,, | Performed by: NURSE PRACTITIONER

## 2020-09-03 PROCEDURE — 99214 OFFICE O/P EST MOD 30 MIN: CPT | Mod: S$GLB,,, | Performed by: NURSE PRACTITIONER

## 2020-09-03 PROCEDURE — 99214 PR OFFICE/OUTPT VISIT, EST, LEVL IV, 30-39 MIN: ICD-10-PCS | Mod: S$GLB,,, | Performed by: NURSE PRACTITIONER

## 2020-09-03 PROCEDURE — 3078F PR MOST RECENT DIASTOLIC BLOOD PRESSURE < 80 MM HG: ICD-10-PCS | Mod: CPTII,S$GLB,, | Performed by: NURSE PRACTITIONER

## 2020-09-03 PROCEDURE — 3074F SYST BP LT 130 MM HG: CPT | Mod: CPTII,S$GLB,, | Performed by: NURSE PRACTITIONER

## 2020-09-03 PROCEDURE — 3078F DIAST BP <80 MM HG: CPT | Mod: CPTII,S$GLB,, | Performed by: NURSE PRACTITIONER

## 2020-09-03 PROCEDURE — 3008F PR BODY MASS INDEX (BMI) DOCUMENTED: ICD-10-PCS | Mod: CPTII,S$GLB,, | Performed by: NURSE PRACTITIONER

## 2020-09-03 PROCEDURE — 93000 ELECTROCARDIOGRAM COMPLETE: CPT | Mod: S$GLB,,, | Performed by: INTERNAL MEDICINE

## 2020-09-03 PROCEDURE — 3008F BODY MASS INDEX DOCD: CPT | Mod: CPTII,S$GLB,, | Performed by: NURSE PRACTITIONER

## 2020-09-03 PROCEDURE — 93000 EKG 12-LEAD: ICD-10-PCS | Mod: S$GLB,,, | Performed by: INTERNAL MEDICINE

## 2020-09-03 PROCEDURE — 3074F PR MOST RECENT SYSTOLIC BLOOD PRESSURE < 130 MM HG: ICD-10-PCS | Mod: CPTII,S$GLB,, | Performed by: NURSE PRACTITIONER

## 2020-09-03 RX ORDER — VALSARTAN 40 MG/1
40 TABLET ORAL DAILY
Qty: 90 TABLET | Refills: 1 | Status: SHIPPED | OUTPATIENT
Start: 2020-09-03 | End: 2021-06-04

## 2020-09-09 DIAGNOSIS — I42.2 HYPERTROPHIC CARDIOMYOPATHY: Primary | Chronic | ICD-10-CM

## 2020-09-14 ENCOUNTER — TELEPHONE (OUTPATIENT)
Dept: CARDIOLOGY | Facility: CLINIC | Age: 35
End: 2020-09-14

## 2020-09-14 NOTE — TELEPHONE ENCOUNTER
Reported difficulty with word finding intermittently since starting the chlorthalidone and valsartan.  Neither drug has memory issues associated with them on their marketing labels.  Had worse issues when on the CCB.  He runs outside almost daily for 3-4 miles and is taking chlorthalidone 12.5 mg daily.  D/w Dr. Mcbride.  Will stop chlorthalidone and have him monitor his BP for the next week.  Will likely need to increase the valsartan.  He will let me know in a week what his BP readings are and if he notices a difference with is memory.

## 2020-10-14 ENCOUNTER — TELEPHONE (OUTPATIENT)
Dept: GENETICS | Facility: CLINIC | Age: 35
End: 2020-10-14

## 2020-10-15 ENCOUNTER — OFFICE VISIT (OUTPATIENT)
Dept: GENETICS | Facility: CLINIC | Age: 35
End: 2020-10-15
Payer: COMMERCIAL

## 2020-10-15 ENCOUNTER — LAB VISIT (OUTPATIENT)
Dept: LAB | Facility: HOSPITAL | Age: 35
End: 2020-10-15
Attending: INTERNAL MEDICINE
Payer: COMMERCIAL

## 2020-10-15 VITALS — BODY MASS INDEX: 26.36 KG/M2 | HEIGHT: 74 IN | WEIGHT: 205.38 LBS

## 2020-10-15 DIAGNOSIS — I42.2 HYPERTROPHIC CARDIOMYOPATHY: Chronic | ICD-10-CM

## 2020-10-15 DIAGNOSIS — I42.2 HYPERTROPHIC CARDIOMYOPATHY: Primary | Chronic | ICD-10-CM

## 2020-10-15 LAB
ANION GAP SERPL CALC-SCNC: 7 MMOL/L (ref 8–16)
BNP SERPL-MCNC: 16 PG/ML (ref 0–99)
BUN SERPL-MCNC: 21 MG/DL (ref 6–20)
CALCIUM SERPL-MCNC: 9.3 MG/DL (ref 8.7–10.5)
CHLORIDE SERPL-SCNC: 104 MMOL/L (ref 95–110)
CO2 SERPL-SCNC: 30 MMOL/L (ref 23–29)
CREAT SERPL-MCNC: 1.2 MG/DL (ref 0.5–1.4)
EST. GFR  (AFRICAN AMERICAN): >60 ML/MIN/1.73 M^2
EST. GFR  (NON AFRICAN AMERICAN): >60 ML/MIN/1.73 M^2
GLUCOSE SERPL-MCNC: 93 MG/DL (ref 70–110)
POTASSIUM SERPL-SCNC: 4.4 MMOL/L (ref 3.5–5.1)
SODIUM SERPL-SCNC: 141 MMOL/L (ref 136–145)

## 2020-10-15 PROCEDURE — 99999 PR PBB SHADOW E&M-EST. PATIENT-LVL III: CPT | Mod: PBBFAC,,,

## 2020-10-15 PROCEDURE — 99499 NO LOS: ICD-10-PCS | Mod: S$GLB,,, | Performed by: MEDICAL GENETICS

## 2020-10-15 PROCEDURE — 83880 ASSAY OF NATRIURETIC PEPTIDE: CPT

## 2020-10-15 PROCEDURE — 96040 PR GENETIC COUNSELING, EACH 30 MIN: CPT | Mod: S$GLB,,, | Performed by: GENETIC COUNSELOR, MS

## 2020-10-15 PROCEDURE — 80048 BASIC METABOLIC PNL TOTAL CA: CPT

## 2020-10-15 PROCEDURE — 99999 PR PBB SHADOW E&M-EST. PATIENT-LVL III: ICD-10-PCS | Mod: PBBFAC,,,

## 2020-10-15 PROCEDURE — 96040 PR GENETIC COUNSELING, EACH 30 MIN: ICD-10-PCS | Mod: S$GLB,,, | Performed by: GENETIC COUNSELOR, MS

## 2020-10-15 PROCEDURE — 36415 COLL VENOUS BLD VENIPUNCTURE: CPT

## 2020-10-15 PROCEDURE — 99499 UNLISTED E&M SERVICE: CPT | Mod: S$GLB,,, | Performed by: MEDICAL GENETICS

## 2020-10-15 NOTE — PROGRESS NOTES
Kurt Shelley     DOS: 10/15/2020  : 1985   MRN: [id number]     HISTORY OF PRESENT ILLNESS: We have seen this 35-year-old male with hypertrophic cardiomyopathy (HCM). Mr. Shelley was diagnosed with HCM after a few months of feeling sporadic chest pains not associated with any particular activity. His EKG, echo and cardiac MRI point towards HCM. He also has HTN. His cardiologist Dr. Flores referred him for a genetic evaluation of hereditary HCM.    A hypertrophic cardiomyopathy (HCM) sequencing and deletion/duplication panel as well as a dilated cardiomyopathy (DCM)/left ventricular non-compaction (LVNC) sequencing and del/dup panel were ordered in 2017 and were negative.     Mr. Shelley returns today after a 3-year hiatus. He was last seen by cardiology in 9/3/2020.      PAST MEDICAL HISTORY:   Osteoarthritis of left knee  Essential hypertension  Hypertrophic cardiomyopathy  Abnormal ECG  Allergic rhinitis due to pollen    FAMILY HISTORY: Mr. Shelley has 4 children, a 17-year-old son, a 15-year-old son, a 12-year-old daughter, and an almost 2-year-old son. They have not had an echo but have no medical concerns. His brother  at age 23 from heart failure. He has a healthy 40-year-old sister. His mom is 61 and has no heart problems. His father  at age 34 from a gunshot. His maternal uncle  of an MI in his 50s and another maternal uncle  of an MI in his 40s.     IMPRESSION: Mr. Shelley is a 35-year-old male with hypertrophic cardiomyopathy (HCM). A 2017 HCM panel and DCM/LVNC panel were negative.     We discussed that there may be an underlying genetic cause for HCM and updated genetic testing is warranted. The 102 gene cardiomyopathy panel from [facility] is a comprehensive genetic test which covers all types of cardiomyopathy. We discussed possible results including the possibility of a variant of uncertain significance (VUS).     If Mr. Shelley is found to have a pathogenic variant, his children  and his sister would have a 50% risk to inherit the variant.     We will plan to follow-up once results are returned.     RECOMMENDATIONS:  1. Cardiomyopathy panel from [facility]  2. Follow-up once results are returned     TIME SPENT: 30 minutes with over 50% spent counseling.     Eliz Chavez, MPH, MS, [facility]  Certified Genetic Counselor  [facility]     Dutch Wyatt M.D.                                                                            Section Head - Medical Genetics                                                                                       [facility]

## 2020-10-15 NOTE — LETTER
October 15, 2020      Wanda Mcbride MD  [address]  8th Floor - Cardiology  [place] [address]           [facility] [address]  [address] [address]  Phone: [phone]          Patient: Kurt Shelley   MR Number: [id number]   YOB: 1985   Date of Visit: 10/15/2020       Dear Dr. Wanda Mcbride:    Thank you for referring Kurt Shelley to me for evaluation. Attached you will find relevant portions of my assessment and plan of care.    If you have questions, please do not hesitate to call me. I look forward to following Kurt Shelley along with you.    Sincerely,    Eliz Chavez, MS    Enclosure  CC:  No Recipients    If you would like to receive this communication electronically, please contact [email or web address] or [phone] to request more information on [employer] Link access.    For providers and/or their staff who would like to refer a patient to [facility], please contact us through our one-stop-shop provider referral line, [facility], at [phone].    If you feel you have received this communication in error or would no longer like to receive these types of communications, please e-mail [email or web address]

## 2020-10-28 ENCOUNTER — HOSPITAL ENCOUNTER (OUTPATIENT)
Dept: CARDIOLOGY | Facility: HOSPITAL | Age: 35
Discharge: HOME | End: 2020-10-28
Attending: NURSE PRACTITIONER
Payer: COMMERCIAL

## 2020-10-28 ENCOUNTER — PATIENT MESSAGE (OUTPATIENT)
Dept: CARDIOLOGY | Facility: CLINIC | Age: 35
End: 2020-10-28

## 2020-10-28 VITALS — HEIGHT: 73 IN | WEIGHT: 205 LBS | BODY MASS INDEX: 27.17 KG/M2

## 2020-10-28 DIAGNOSIS — I42.2 HYPERTROPHIC CARDIOMYOPATHY: Chronic | ICD-10-CM

## 2020-10-28 LAB
CV STRESS BASE HR: 67 BPM
DIASTOLIC BLOOD PRESSURE: 104 MMHG
OHS CV CPX 1 MINUTE RECOVERY HEART RATE: 160 BPM
OHS CV CPX 85 PERCENT MAX PREDICTED HEART RATE MALE: 156
OHS CV CPX ESTIMATED METS: 20
OHS CV CPX MAX PREDICTED HEART RATE: 183
OHS CV CPX PATIENT IS FEMALE: 0
OHS CV CPX PATIENT IS MALE: 1
OHS CV CPX PEAK DIASTOLIC BLOOD PRESSURE: 59 MMHG
OHS CV CPX PEAK HEAR RATE: 179 BPM
OHS CV CPX PEAK RATE PRESSURE PRODUCT: NORMAL
OHS CV CPX PEAK SYSTOLIC BLOOD PRESSURE: 179 MMHG
OHS CV CPX PERCENT MAX PREDICTED HEART RATE ACHIEVED: 98
OHS CV CPX RATE PRESSURE PRODUCT PRESENTING: 9983
STRESS ECHO POST EXERCISE DUR MIN: 11 MINUTES
STRESS ECHO POST EXERCISE DUR SEC: 15 SECONDS
SYSTOLIC BLOOD PRESSURE: 149 MMHG

## 2020-10-28 PROCEDURE — 93016 EXERCISE STRESS - EKG (CUPID ONLY): ICD-10-PCS | Mod: ,,, | Performed by: INTERNAL MEDICINE

## 2020-10-28 PROCEDURE — 93018 EXERCISE STRESS - EKG (CUPID ONLY): ICD-10-PCS | Mod: ,,, | Performed by: INTERNAL MEDICINE

## 2020-10-28 PROCEDURE — 93016 CV STRESS TEST SUPVJ ONLY: CPT | Mod: ,,, | Performed by: INTERNAL MEDICINE

## 2020-10-28 PROCEDURE — 93018 CV STRESS TEST I&R ONLY: CPT | Mod: ,,, | Performed by: INTERNAL MEDICINE

## 2020-10-28 PROCEDURE — 93017 CV STRESS TEST TRACING ONLY: CPT

## 2020-11-04 ENCOUNTER — OFFICE VISIT (OUTPATIENT)
Dept: TRANSPLANT | Facility: CLINIC | Age: 35
End: 2020-11-04
Attending: INTERNAL MEDICINE
Payer: COMMERCIAL

## 2020-11-04 VITALS
HEART RATE: 67 BPM | BODY MASS INDEX: 26.68 KG/M2 | SYSTOLIC BLOOD PRESSURE: 130 MMHG | DIASTOLIC BLOOD PRESSURE: 89 MMHG | WEIGHT: 207.88 LBS | HEIGHT: 74 IN | OXYGEN SATURATION: 98 %

## 2020-11-04 DIAGNOSIS — I10 ESSENTIAL HYPERTENSION: ICD-10-CM

## 2020-11-04 DIAGNOSIS — I42.2 HYPERTROPHIC CARDIOMYOPATHY: Primary | ICD-10-CM

## 2020-11-04 DIAGNOSIS — I42.8 OTHER CARDIOMYOPATHIES: ICD-10-CM

## 2020-11-04 PROCEDURE — 99999 PR PBB SHADOW E&M-EST. PATIENT-LVL III: CPT | Mod: PBBFAC,,, | Performed by: INTERNAL MEDICINE

## 2020-11-04 PROCEDURE — 99215 OFFICE O/P EST HI 40 MIN: CPT | Mod: S$GLB,,, | Performed by: INTERNAL MEDICINE

## 2020-11-04 PROCEDURE — 99999 PR PBB SHADOW E&M-EST. PATIENT-LVL III: ICD-10-PCS | Mod: PBBFAC,,, | Performed by: INTERNAL MEDICINE

## 2020-11-04 PROCEDURE — 99215 PR OFFICE/OUTPT VISIT, EST, LEVL V, 40-54 MIN: ICD-10-PCS | Mod: S$GLB,,, | Performed by: INTERNAL MEDICINE

## 2020-11-04 NOTE — PATIENT INSTRUCTIONS
You have been diagnosed with a hypertrophic cardiomyopathy.  The most likely culprit is a genetic mutation leading to this disease and we can identify this mutation in 50-70% of cases with genetic testing.  If you agree to genetic testing and a genetic mutation associated with hypertrophic cardiomyopathy is noted then all of your first degree relatives (parents, brothers, sisters and children) should be checked for this mutation.  In the absence of a known genetic mutation or should you refuse genetic testing then all first degree relatives (parents, brothers, sisters and children) should be seen by a cardiologist for EKG, ECHO and a history and physical examination for this entity.  Depending upon the age of the relative being tested recurrent screening may be necessary.    Most patients with your condition live a normal life expectancy.  You must avoid overly strenuous/competitive physical activities.  I recommend you carry a card in your wallet or purchase a Medi-Alert bracelet with this diagnosis displayed.  It is very important that all of you caregivers including your dentist know of this condition as there are certain medications you cannot have and others that should be avoided if possible and if not then carefully monitored.    You will require periodic testing which I will define for your particular case to potentially include exercise stress testing, 48 hr holter monitor, serial echocardiograms of your heart and at least yearly visits.  Medications for this condition are only prescribed for symptoms.  Surgery (septal myomectomy) or the less invasive alcohol ablation is reserved for patients with significant, persistent symptoms despite medical management.  Should you require such a procedure I will advise you as to which procedure is best for you at that time.

## 2020-11-08 NOTE — PROGRESS NOTES
"Subjective:     Patient ID:  Kurt Shelley is a 35 y.o. male who presents for evaluation of Cardiomyopathy (Is this HCM?)    HPI:  34 yo BM referred by Dr. Flores for evaluation of hypertrophic CM.  He was in the  and recalls at age 16 or 18 yo was told BP elevated but no medication prescribed.  He was told of an abnormal EKG age 19.  He was not officially dx with and placed on treatment for HBP until 2018.  He has no documented family hx of HCM though did have a brother who  suddenly at age 23 he was obese with LUIS FELIPE.  Patient will attempt to obtain copy of autopsy report.      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  He has occasional isolated palpitations but no other CV symptoms.    Past Medical History:   Diagnosis Date    Allergy     Asthma     Family history of sudden cardiac death in brother 2017    Hypertension     Hypertrophic cardiomyopathy      History reviewed. No pertinent surgical history.    Family History   Problem Relation Age of Onset    Diabetes Mother     Hypertension Mother     Early death Brother 23        Sleep apnea; obese; SCD--says "heart failure" was on death certificate    Cancer Paternal Grandfather 78        colon ca and prostate ca   He has 3 sons age 17, 15 and 2 years of age and 1 daughter age 12; all A&W though none have been screened yet for HCM    Social History     Socioeconomic History    Marital status:    Occupational History    See HPI   Tobacco Use    Smoking status: Never Smoker    Smokeless tobacco: Never Used   Substance and Sexual Activity    Alcohol use: Yes     Alcohol/week: 4.0 standard drinks     Types: 3 Shots of liquor, 1 Standard drinks or equivalent per week     Comment: occ    Drug use: No     Medication Sig    chlorthalidone (HYGROTEN) 25 MG Tab TAKE 1/2 TABLET BY MOUTH DAILY    valsartan (DIOVAN) 40 MG tablet Take 1 tablet (40 mg " "total) once daily.     Review of patient's allergies indicates:   Allergen Reactions    No known drug allergies      Review of Systems   Constitution: Negative for chills, decreased appetite, diaphoresis, fever, malaise/fatigue, night sweats, weight gain and weight loss.   HENT: Negative for ear discharge, ear pain, hearing loss and hoarse voice.    Eyes: Negative for photophobia and visual disturbance.   Cardiovascular: Positive for palpitations. Negative for chest pain, dyspnea on exertion, irregular heartbeat, leg swelling, orthopnea, paroxysmal nocturnal dyspnea and syncope.   Respiratory: Negative for cough, hemoptysis, shortness of breath, sputum production and wheezing.    Endocrine: Negative for cold intolerance, heat intolerance, polydipsia and polyuria.   Hematologic/Lymphatic: Negative for bleeding problem. Does not bruise/bleed easily.   Musculoskeletal: Positive for joint pain (knee). Negative for arthritis and back pain.   Gastrointestinal: Negative for abdominal pain, anorexia, change in bowel habit, dysphagia, heartburn, hematochezia and melena.   Genitourinary: Negative for dysuria, frequency and hematuria.   Neurological: Negative for brief paralysis, focal weakness, headaches, seizures and weakness.   Allergic/Immunologic: Positive for environmental allergies.     Objective:   Physical Exam   Constitutional: He is oriented to person, place, and time. He appears well-developed and well-nourished. No distress.   /89 (BP Location: Left arm, Patient Position: Sitting, BP Method: Medium (Automatic))   Pulse 67   Ht 6' 2" (1.88 m)   Wt 94.3 kg (207 lb 14.3 oz)   SpO2 98%   BMI 26.69 kg/m²   Later by me BP right arm 152/94 and left arm 154/96 sitting   HENT:   Head: Normocephalic and atraumatic.   Eyes: Conjunctivae are normal. Right eye exhibits no discharge. Left eye exhibits no discharge. No scleral icterus.   Neck: No JVD present. No thyromegaly present.   Cardiovascular: Normal rate, " regular rhythm and normal heart sounds. Exam reveals no gallop and no friction rub.   No murmur (no mumrur sitting, supine, valsalvo, squat to standing) heard.  Pulmonary/Chest: Effort normal and breath sounds normal.   Abdominal: Soft. Bowel sounds are normal. He exhibits no distension and no mass. There is no abdominal tenderness. There is no rebound and no guarding.   No bruits   Musculoskeletal:         General: No tenderness or edema.   Neurological: He is alert and oriented to person, place, and time.   Skin: Skin is warm. He is not diaphoretic.   Psychiatric: He has a normal mood and affect. His behavior is normal. Judgment and thought content normal.      ECHO 8/26/2020 Conclusion  · Moderate left atrial enlargement.  · Thickened walls (with deep crypts) consistent with hypertrophic cardiomyopathy.  I feel LV walls are thicker than reported and approx 1.5-1.6 cm  · No systolic anterior motion of the mitral valve, asymmetric septal hypertrophy or LV outflow gradient.  · Normal left ventricular systolic function. The estimated ejection fraction is 60%.  · The left ventricular global longitudinal strain is -15%.  · Indeterminate left ventricular diastolic function.  · Normal right ventricular systolic function.  · Mild pulmonic regurgitation.  · The estimated PA systolic pressure is 29 mmHg.  · Intermediate central venous pressure (8 mmHg).        EKG 1/11/2010, 6/23/2017, 7/10/2017, 3/15/2019, 9/3/2020, 10/28/2020  Sinus with marked LVH and repol changes (first EKG done at age 25 that we have access to but he was told of abnormal EKG age 19 which would be around 2004)    CARDIAC MRI 7/31/2017   1.  Left Ventricle:   The left ventricular volumes are normal. There are no regional wall motion abnormalities seen. The calculated LVEF is 71%.  There are crypts noted in the basal anterolateral and inferolateral wall. The maximum thickness of the ventricular walls is 12mm.   LVEDV: 176cc   LVEDV:83 cc/m2 for BSA (nl  68-112cc)   LVESV:  51cc   LVESV: 24cc/m2 for BSA (nl 16-44cc)   LVEF:  71%   LV mass: 83g/m2 (nl 47-87g/m2)   Stroke volume:  124cc by ventricular trace   Stroke volume: 93cc by Q-flow analysis   Regurgitant volume:2 cc by Q-flow analysis   Total volume through the aortic valve:95cc by Q-flow analysis   2.  Right Ventricle:     The right ventricular volumes are normal.  The calculated RVEF is 63%.   3.  Atria:   a.  Right:  The right atrium is normal in size. Area of  20cm2.   b.  Left:  The left atrium is normal in size. Volume measures 36 cc/m2 by area length method.   4.  Cardiac Valves:   a.  Mitral:  The mitral valve is structurally normal.     b.  Aortic:  The aortic valve is structurally normal     c.  Pulmonic:  The pulmonic valve is not visualized on this study.   d.  Tricuspid:  The tricuspid valve is structurally normal. TR noted   5.  Pulmonic vasculature on axial T1 EKG gated images:   a.  RPA: 18mm   b.  LPA: 22mm   c.  MPA: 30mm   6.  Aorta on axial T1 EKG gated images:   a. Ascending aorta: 32mm   b.  Descending aorta: 17mm   c.  Aortic arch:  Left sided   7.  Delayed hyperenhancement: There is no delayed hyperenhancement of the myocardium.     8. Pericardium:No effusion     CARDIAC MRI Conclusion:     1. Normal LV volume with LVEF of  71%. Upper limits of LV mass with maximum wall thickness of 12mm. There is cavity obliteration noted of the mid LV to apex with systole with myocardial crypts noted in the basal anterolateral and inferolateral walls. No   DHE noted. At this time, unable to fit the definition of a specific CM.   2. Normal RV volume with RVEF of  63%.     7/13/17 ECHO CONCLUSIONS     1 - Normal left ventricular systolic function (EF 65-70%).     2 - No wall motion abnormalities.     3 - Concentric hypertrophy. Walls 1.3-1.4 cm    4 - There is a borderline increased density to the myocardium, raising a  possibility of a myocardial infiltrative process (clinical correlation required).  "    5 - Normal left ventricular diastolic function.     6 - Right atrial enlargement.     7 - Right ventricle is upper limit of normal in size with hypertrophy, with normal systolic function.     8 - Trivial mitral regurgitation.     9 - Trivial to mild tricuspid regurgitation.     10 - Trivial to mild pulmonic regurgitation.     11 - The estimated PA systolic pressure is 30 mmHg.   Assessment:     1. Hypertrophic cardiomyopathy suspected    2. Essential hypertension      Plan:   The dx of HCM requires LVH >1.5 cm in the absence of HBP or lesser degree of 1.2 cm if known family hx of HCM.  In his case this is difficult dx to make first because we have no definite family hx of HCM and second, since we know he had HBP at young age in  (while in [place]) but he was not started on BP treatment until age 19.  It was at that time in 2005 that he was told that he had an abnormal EKG but no other details known.  [facility]15[facility]2020 they note abnormal EKG ("non-specific abnormal") is only info on problem list.  He reports BP was controlled after treatment initiated 16 yrs ago but found some records where BP was elevated:  Row Name  02/24/12   0413  02/06/12   0248  01/13/12   0138  12/22/11   0352  07/26/11   0433   Enc Vitals   BP  131/86  152/101Abnormal   154/100Abnormal   125/86  137/90     There are other visits in 2012 BP reasonably controlled as well as 2015, 2016, 2017.  ED visit 6/23/2017 /94 but 6/29/17 office visit /84.  Visit 7/10/17 -140/84.  On 8/11/17 visit with Heme /90 but 8/23/17 visit in Cards -134/79.  Cards visit 5/2/2018 -127/63-67.  On 9/13/18 /89, 3/15/19 /86, 9/26/19 /84, 10/25/19 /74, [facility] note /94 in 10/15/20 summary note but not sure of date BP taken, 4/9/20 ED visit /93, 8/26/20 /70, 9/3/20 /73.    In his case I feel our best chance to make a definite dx would be to demonstrate progressive " LVH over the past 3 years.  This would clearly be more consistent with HCM progression than LVH due to HBP with documented readings over this time frame.  Even without this, it seems unlikely that his HBP is resulting in significant LVH and the markedly abnormal EKG as for that to be the case he would have had uncontrolled HBP likely before and definitely during his  career.  Could the  missed this on enrollment physical and medical checks?  This does not seem plausible.     For now will proceed with cardiac MRI and await results of his genetic testing.  At the same time that I have explained that I cannot conclusively make this dx now, I would like for him to not perform competitive athletics and to replace jogging with brisk walking program.  We also discussed all of the info on patient education sheet.    He will also attempt to obtain autopsy report from 's office of his brother who  suddenly for my review.    Total duration of face to face visit time 45 minutes.  Total time spent counseling greater than fifty percent of total visit time.  Counseling included discussion regarding imaging findings, diagnosis, possibilities, treatment options, risks and benefits.  The patient had many questions regarding the options and long-term effects.

## 2020-11-09 ENCOUNTER — TELEPHONE (OUTPATIENT)
Dept: TRANSPLANT | Facility: HOSPITAL | Age: 35
End: 2020-11-09

## 2020-11-09 NOTE — TELEPHONE ENCOUNTER
----- Message from Wili Walter MD sent at 11/9/2020  9:31 AM CST -----  Regarding: RE: INCORRECT MEAN HEART RATE ON HOLTER  Paulino,  I can't figure out how to get back into that report to fix it.  The tech transposed the numbers. Sinus rhythm, range  bpm, average 74 bpm.  Sorry for the confusion.  Ricardo  ----- Message -----  From: Vinay Parekh Jr., MD  Sent: 11/4/2020   1:18 PM CST  To: Wili Walter MD  Subject: INCORRECT MEAN HEART RATE ON HOLTER              Ricardo, would you please review Holter report from August 2020 and report the corrected mean heart rate for if that is correct the correct range in heart rate so I know how to adjust his medications.  I saw him for the 1st time today.    Thanks,   Paulino

## 2020-12-15 LAB
GENETIC COUNSELING?: YES
GENSO SPECIMEN TYPE: NORMAL
MISCELLANEOUS GENETIC TEST NAME: NORMAL
PARTENTAL OR SIBLING TESTING?: NO
REFERENCE LAB: NORMAL
TEST RESULT: NORMAL

## 2020-12-18 ENCOUNTER — TELEPHONE (OUTPATIENT)
Dept: GENETICS | Facility: CLINIC | Age: 35
End: 2020-12-18

## 2020-12-18 NOTE — TELEPHONE ENCOUNTER
Spoke to Mr. Shelley about genetic testing results. He is a carrier for hemochromatosis. This does not explain his cardiomyopathy. We discussed HFE in more detail. His partner could be tested if desired. Will send copy of report. While the test was otherwise negative, we are not able to rule out an underlying genetic cause. He can return to genetics in 2-3 years for additional testing if desired.

## 2021-02-25 ENCOUNTER — LAB VISIT (OUTPATIENT)
Dept: LAB | Facility: HOSPITAL | Age: 36
End: 2021-02-25
Attending: STUDENT IN AN ORGANIZED HEALTH CARE EDUCATION/TRAINING PROGRAM
Payer: COMMERCIAL

## 2021-02-25 ENCOUNTER — OFFICE VISIT (OUTPATIENT)
Dept: INTERNAL MEDICINE | Facility: CLINIC | Age: 36
End: 2021-02-25
Payer: COMMERCIAL

## 2021-02-25 VITALS
BODY MASS INDEX: 26.03 KG/M2 | SYSTOLIC BLOOD PRESSURE: 118 MMHG | TEMPERATURE: 97 F | HEIGHT: 74 IN | WEIGHT: 202.81 LBS | RESPIRATION RATE: 16 BRPM | OXYGEN SATURATION: 98 % | HEART RATE: 72 BPM | DIASTOLIC BLOOD PRESSURE: 88 MMHG

## 2021-02-25 DIAGNOSIS — I10 ESSENTIAL HYPERTENSION: ICD-10-CM

## 2021-02-25 DIAGNOSIS — Z00.00 PHYSICAL EXAM, ANNUAL: ICD-10-CM

## 2021-02-25 DIAGNOSIS — Z76.89 ESTABLISHING CARE WITH NEW DOCTOR, ENCOUNTER FOR: Primary | ICD-10-CM

## 2021-02-25 DIAGNOSIS — I42.2 HYPERTROPHIC CARDIOMYOPATHY: ICD-10-CM

## 2021-02-25 LAB
ANISOCYTOSIS BLD QL SMEAR: SLIGHT
BASOPHILS # BLD AUTO: 0.03 K/UL (ref 0–0.2)
BASOPHILS NFR BLD: 1 % (ref 0–1.9)
BURR CELLS BLD QL SMEAR: ABNORMAL
DIFFERENTIAL METHOD: ABNORMAL
EOSINOPHIL # BLD AUTO: 0.1 K/UL (ref 0–0.5)
EOSINOPHIL NFR BLD: 2.4 % (ref 0–8)
ERYTHROCYTE [DISTWIDTH] IN BLOOD BY AUTOMATED COUNT: 13.9 % (ref 11.5–14.5)
ESTIMATED AVG GLUCOSE: 123 MG/DL (ref 68–131)
GIANT PLATELETS BLD QL SMEAR: PRESENT
HBA1C MFR BLD: 5.9 % (ref 4–5.6)
HCT VFR BLD AUTO: 42.8 % (ref 40–54)
HGB BLD-MCNC: 13.7 G/DL (ref 14–18)
HYPOCHROMIA BLD QL SMEAR: ABNORMAL
IMM GRANULOCYTES # BLD AUTO: 0.01 K/UL (ref 0–0.04)
IMM GRANULOCYTES NFR BLD AUTO: 0.3 % (ref 0–0.5)
LYMPHOCYTES # BLD AUTO: 1.6 K/UL (ref 1–4.8)
LYMPHOCYTES NFR BLD: 54.2 % (ref 18–48)
MCH RBC QN AUTO: 28 PG (ref 27–31)
MCHC RBC AUTO-ENTMCNC: 32 G/DL (ref 32–36)
MCV RBC AUTO: 87 FL (ref 82–98)
MONOCYTES # BLD AUTO: 0.4 K/UL (ref 0.3–1)
MONOCYTES NFR BLD: 12.9 % (ref 4–15)
NEUTROPHILS # BLD AUTO: 0.8 K/UL (ref 1.8–7.7)
NEUTROPHILS NFR BLD: 29.2 % (ref 38–73)
NRBC BLD-RTO: 0 /100 WBC
OVALOCYTES BLD QL SMEAR: ABNORMAL
PLATELET # BLD AUTO: 220 K/UL (ref 150–350)
PLATELET BLD QL SMEAR: ABNORMAL
PMV BLD AUTO: 11.1 FL (ref 9.2–12.9)
POIKILOCYTOSIS BLD QL SMEAR: SLIGHT
RBC # BLD AUTO: 4.9 M/UL (ref 4.6–6.2)
SCHISTOCYTES BLD QL SMEAR: ABNORMAL
WBC # BLD AUTO: 2.86 K/UL (ref 3.9–12.7)

## 2021-02-25 PROCEDURE — 3079F PR MOST RECENT DIASTOLIC BLOOD PRESSURE 80-89 MM HG: ICD-10-PCS | Mod: CPTII,S$GLB,, | Performed by: STUDENT IN AN ORGANIZED HEALTH CARE EDUCATION/TRAINING PROGRAM

## 2021-02-25 PROCEDURE — 80061 LIPID PANEL: CPT

## 2021-02-25 PROCEDURE — 85025 COMPLETE CBC W/AUTO DIFF WBC: CPT

## 2021-02-25 PROCEDURE — 99999 PR PBB SHADOW E&M-EST. PATIENT-LVL III: CPT | Mod: PBBFAC,,, | Performed by: STUDENT IN AN ORGANIZED HEALTH CARE EDUCATION/TRAINING PROGRAM

## 2021-02-25 PROCEDURE — 99999 PR PBB SHADOW E&M-EST. PATIENT-LVL III: ICD-10-PCS | Mod: PBBFAC,,, | Performed by: STUDENT IN AN ORGANIZED HEALTH CARE EDUCATION/TRAINING PROGRAM

## 2021-02-25 PROCEDURE — 83036 HEMOGLOBIN GLYCOSYLATED A1C: CPT

## 2021-02-25 PROCEDURE — 80053 COMPREHEN METABOLIC PANEL: CPT

## 2021-02-25 PROCEDURE — 1126F PR PAIN SEVERITY QUANTIFIED, NO PAIN PRESENT: ICD-10-PCS | Mod: S$GLB,,, | Performed by: STUDENT IN AN ORGANIZED HEALTH CARE EDUCATION/TRAINING PROGRAM

## 2021-02-25 PROCEDURE — 3074F PR MOST RECENT SYSTOLIC BLOOD PRESSURE < 130 MM HG: ICD-10-PCS | Mod: CPTII,S$GLB,, | Performed by: STUDENT IN AN ORGANIZED HEALTH CARE EDUCATION/TRAINING PROGRAM

## 2021-02-25 PROCEDURE — 99395 PR PREVENTIVE VISIT,EST,18-39: ICD-10-PCS | Mod: S$GLB,,, | Performed by: STUDENT IN AN ORGANIZED HEALTH CARE EDUCATION/TRAINING PROGRAM

## 2021-02-25 PROCEDURE — 3074F SYST BP LT 130 MM HG: CPT | Mod: CPTII,S$GLB,, | Performed by: STUDENT IN AN ORGANIZED HEALTH CARE EDUCATION/TRAINING PROGRAM

## 2021-02-25 PROCEDURE — 84436 ASSAY OF TOTAL THYROXINE: CPT

## 2021-02-25 PROCEDURE — 36415 COLL VENOUS BLD VENIPUNCTURE: CPT | Mod: PO | Performed by: STUDENT IN AN ORGANIZED HEALTH CARE EDUCATION/TRAINING PROGRAM

## 2021-02-25 PROCEDURE — 84153 ASSAY OF PSA TOTAL: CPT

## 2021-02-25 PROCEDURE — 84443 ASSAY THYROID STIM HORMONE: CPT

## 2021-02-25 PROCEDURE — 3079F DIAST BP 80-89 MM HG: CPT | Mod: CPTII,S$GLB,, | Performed by: STUDENT IN AN ORGANIZED HEALTH CARE EDUCATION/TRAINING PROGRAM

## 2021-02-25 PROCEDURE — 3008F PR BODY MASS INDEX (BMI) DOCUMENTED: ICD-10-PCS | Mod: CPTII,S$GLB,, | Performed by: STUDENT IN AN ORGANIZED HEALTH CARE EDUCATION/TRAINING PROGRAM

## 2021-02-25 PROCEDURE — 1126F AMNT PAIN NOTED NONE PRSNT: CPT | Mod: S$GLB,,, | Performed by: STUDENT IN AN ORGANIZED HEALTH CARE EDUCATION/TRAINING PROGRAM

## 2021-02-25 PROCEDURE — 99395 PREV VISIT EST AGE 18-39: CPT | Mod: S$GLB,,, | Performed by: STUDENT IN AN ORGANIZED HEALTH CARE EDUCATION/TRAINING PROGRAM

## 2021-02-25 PROCEDURE — 3008F BODY MASS INDEX DOCD: CPT | Mod: CPTII,S$GLB,, | Performed by: STUDENT IN AN ORGANIZED HEALTH CARE EDUCATION/TRAINING PROGRAM

## 2021-02-25 RX ORDER — CETIRIZINE HYDROCHLORIDE 10 MG/1
10 TABLET ORAL DAILY PRN
COMMUNITY

## 2021-02-26 LAB
ALBUMIN SERPL BCP-MCNC: 4.4 G/DL (ref 3.5–5.2)
ALP SERPL-CCNC: 56 U/L (ref 55–135)
ALT SERPL W/O P-5'-P-CCNC: 15 U/L (ref 10–44)
ANION GAP SERPL CALC-SCNC: 9 MMOL/L (ref 8–16)
AST SERPL-CCNC: 20 U/L (ref 10–40)
BILIRUB SERPL-MCNC: 0.5 MG/DL (ref 0.1–1)
BUN SERPL-MCNC: 15 MG/DL (ref 6–20)
CALCIUM SERPL-MCNC: 9.5 MG/DL (ref 8.7–10.5)
CHLORIDE SERPL-SCNC: 102 MMOL/L (ref 95–110)
CHOLEST SERPL-MCNC: 191 MG/DL (ref 120–199)
CHOLEST/HDLC SERPL: 3 {RATIO} (ref 2–5)
CO2 SERPL-SCNC: 26 MMOL/L (ref 23–29)
COMPLEXED PSA SERPL-MCNC: 0.56 NG/ML (ref 0–4)
CREAT SERPL-MCNC: 1.3 MG/DL (ref 0.5–1.4)
EST. GFR  (AFRICAN AMERICAN): >60 ML/MIN/1.73 M^2
EST. GFR  (NON AFRICAN AMERICAN): >60 ML/MIN/1.73 M^2
GLUCOSE SERPL-MCNC: 98 MG/DL (ref 70–110)
HDLC SERPL-MCNC: 64 MG/DL (ref 40–75)
HDLC SERPL: 33.5 % (ref 20–50)
LDLC SERPL CALC-MCNC: 120.4 MG/DL (ref 63–159)
NONHDLC SERPL-MCNC: 127 MG/DL
POTASSIUM SERPL-SCNC: 4.4 MMOL/L (ref 3.5–5.1)
PROT SERPL-MCNC: 7.7 G/DL (ref 6–8.4)
SODIUM SERPL-SCNC: 137 MMOL/L (ref 136–145)
T4 SERPL-MCNC: 7.3 UG/DL (ref 4.5–11.5)
TRIGL SERPL-MCNC: 33 MG/DL (ref 30–150)
TSH SERPL DL<=0.005 MIU/L-ACNC: 1.4 UIU/ML (ref 0.4–4)

## 2021-06-03 DIAGNOSIS — I42.2 HYPERTROPHIC CARDIOMYOPATHY: Primary | ICD-10-CM

## 2021-10-18 ENCOUNTER — PATIENT MESSAGE (OUTPATIENT)
Dept: TRANSPLANT | Facility: CLINIC | Age: 36
End: 2021-10-18
Payer: COMMERCIAL

## 2021-10-25 ENCOUNTER — PATIENT OUTREACH (OUTPATIENT)
Dept: ADMINISTRATIVE | Facility: OTHER | Age: 36
End: 2021-10-25
Payer: COMMERCIAL

## 2021-10-27 ENCOUNTER — OFFICE VISIT (OUTPATIENT)
Dept: CARDIOLOGY | Facility: CLINIC | Age: 36
End: 2021-10-27
Payer: COMMERCIAL

## 2021-10-27 VITALS
DIASTOLIC BLOOD PRESSURE: 90 MMHG | HEIGHT: 74 IN | HEART RATE: 74 BPM | BODY MASS INDEX: 26.88 KG/M2 | WEIGHT: 209.44 LBS | SYSTOLIC BLOOD PRESSURE: 138 MMHG

## 2021-10-27 DIAGNOSIS — I10 ESSENTIAL HYPERTENSION: Chronic | ICD-10-CM

## 2021-10-27 DIAGNOSIS — I42.2 HYPERTROPHIC CARDIOMYOPATHY: Primary | Chronic | ICD-10-CM

## 2021-10-27 PROCEDURE — 93000 EKG 12-LEAD: ICD-10-PCS | Mod: S$GLB,,, | Performed by: INTERNAL MEDICINE

## 2021-10-27 PROCEDURE — 3075F PR MOST RECENT SYSTOLIC BLOOD PRESS GE 130-139MM HG: ICD-10-PCS | Mod: CPTII,S$GLB,, | Performed by: NURSE PRACTITIONER

## 2021-10-27 PROCEDURE — 99999 PR PBB SHADOW E&M-EST. PATIENT-LVL III: ICD-10-PCS | Mod: PBBFAC,,, | Performed by: NURSE PRACTITIONER

## 2021-10-27 PROCEDURE — 99214 OFFICE O/P EST MOD 30 MIN: CPT | Mod: S$GLB,,, | Performed by: NURSE PRACTITIONER

## 2021-10-27 PROCEDURE — 3061F PR NEG MICROALBUMINURIA RESULT DOCUMENTED/REVIEW: ICD-10-PCS | Mod: CPTII,S$GLB,, | Performed by: NURSE PRACTITIONER

## 2021-10-27 PROCEDURE — 1159F MED LIST DOCD IN RCRD: CPT | Mod: CPTII,S$GLB,, | Performed by: NURSE PRACTITIONER

## 2021-10-27 PROCEDURE — 3075F SYST BP GE 130 - 139MM HG: CPT | Mod: CPTII,S$GLB,, | Performed by: NURSE PRACTITIONER

## 2021-10-27 PROCEDURE — 1160F RVW MEDS BY RX/DR IN RCRD: CPT | Mod: CPTII,S$GLB,, | Performed by: NURSE PRACTITIONER

## 2021-10-27 PROCEDURE — 1160F PR REVIEW ALL MEDS BY PRESCRIBER/CLIN PHARMACIST DOCUMENTED: ICD-10-PCS | Mod: CPTII,S$GLB,, | Performed by: NURSE PRACTITIONER

## 2021-10-27 PROCEDURE — 99214 PR OFFICE/OUTPT VISIT, EST, LEVL IV, 30-39 MIN: ICD-10-PCS | Mod: S$GLB,,, | Performed by: NURSE PRACTITIONER

## 2021-10-27 PROCEDURE — 3044F PR MOST RECENT HEMOGLOBIN A1C LEVEL <7.0%: ICD-10-PCS | Mod: CPTII,S$GLB,, | Performed by: NURSE PRACTITIONER

## 2021-10-27 PROCEDURE — 4010F ACE/ARB THERAPY RXD/TAKEN: CPT | Mod: CPTII,S$GLB,, | Performed by: NURSE PRACTITIONER

## 2021-10-27 PROCEDURE — 3080F PR MOST RECENT DIASTOLIC BLOOD PRESSURE >= 90 MM HG: ICD-10-PCS | Mod: CPTII,S$GLB,, | Performed by: NURSE PRACTITIONER

## 2021-10-27 PROCEDURE — 99999 PR PBB SHADOW E&M-EST. PATIENT-LVL III: CPT | Mod: PBBFAC,,, | Performed by: NURSE PRACTITIONER

## 2021-10-27 PROCEDURE — 3066F NEPHROPATHY DOC TX: CPT | Mod: CPTII,S$GLB,, | Performed by: NURSE PRACTITIONER

## 2021-10-27 PROCEDURE — 1159F PR MEDICATION LIST DOCUMENTED IN MEDICAL RECORD: ICD-10-PCS | Mod: CPTII,S$GLB,, | Performed by: NURSE PRACTITIONER

## 2021-10-27 PROCEDURE — 3008F PR BODY MASS INDEX (BMI) DOCUMENTED: ICD-10-PCS | Mod: CPTII,S$GLB,, | Performed by: NURSE PRACTITIONER

## 2021-10-27 PROCEDURE — 93000 ELECTROCARDIOGRAM COMPLETE: CPT | Mod: S$GLB,,, | Performed by: INTERNAL MEDICINE

## 2021-10-27 PROCEDURE — 3061F NEG MICROALBUMINURIA REV: CPT | Mod: CPTII,S$GLB,, | Performed by: NURSE PRACTITIONER

## 2021-10-27 PROCEDURE — 3066F PR DOCUMENTATION OF TREATMENT FOR NEPHROPATHY: ICD-10-PCS | Mod: CPTII,S$GLB,, | Performed by: NURSE PRACTITIONER

## 2021-10-27 PROCEDURE — 4010F PR ACE/ARB THEARPY RXD/TAKEN: ICD-10-PCS | Mod: CPTII,S$GLB,, | Performed by: NURSE PRACTITIONER

## 2021-10-27 PROCEDURE — 3080F DIAST BP >= 90 MM HG: CPT | Mod: CPTII,S$GLB,, | Performed by: NURSE PRACTITIONER

## 2021-10-27 PROCEDURE — 3008F BODY MASS INDEX DOCD: CPT | Mod: CPTII,S$GLB,, | Performed by: NURSE PRACTITIONER

## 2021-10-27 PROCEDURE — 3044F HG A1C LEVEL LT 7.0%: CPT | Mod: CPTII,S$GLB,, | Performed by: NURSE PRACTITIONER

## 2021-10-28 ENCOUNTER — TELEPHONE (OUTPATIENT)
Dept: CARDIOLOGY | Facility: CLINIC | Age: 36
End: 2021-10-28
Payer: COMMERCIAL

## 2021-10-28 DIAGNOSIS — R00.2 PALPITATIONS: Primary | ICD-10-CM

## 2021-11-07 RX ORDER — CHLORTHALIDONE 25 MG/1
12.5 TABLET ORAL DAILY
Qty: 45 TABLET | Refills: 3 | Status: SHIPPED | OUTPATIENT
Start: 2021-11-07 | End: 2022-12-02

## 2021-11-07 RX ORDER — VALSARTAN 40 MG/1
40 TABLET ORAL DAILY
Qty: 90 TABLET | Refills: 3 | Status: SHIPPED | OUTPATIENT
Start: 2021-11-07 | End: 2022-12-02

## 2021-12-06 ENCOUNTER — TELEPHONE (OUTPATIENT)
Dept: CARDIOLOGY | Facility: CLINIC | Age: 36
End: 2021-12-06
Payer: COMMERCIAL

## 2022-01-28 ENCOUNTER — TELEPHONE (OUTPATIENT)
Dept: PRIMARY CARE CLINIC | Facility: CLINIC | Age: 37
End: 2022-01-28
Payer: COMMERCIAL

## 2022-01-28 NOTE — TELEPHONE ENCOUNTER
----- Message from Johsua Dodson MD sent at 1/28/2022 12:17 PM CST -----  Contact: [phone]  Needs appt  ----- Message -----  From: Maggie Fisher  Sent: 1/28/2022  12:13 PM CST  To: [place] Staff    Pt needs a call back in regards to a screening. Please f/u with pt

## 2022-01-28 NOTE — TELEPHONE ENCOUNTER
Patient called into the office requesting a screening test for his sons spleen, so he can go back to playing basketball. Informed patient at he would have to request this screening from his sons pediatrician.

## 2022-01-31 ENCOUNTER — TELEPHONE (OUTPATIENT)
Dept: CARDIOLOGY | Facility: CLINIC | Age: 37
End: 2022-01-31
Payer: COMMERCIAL

## 2022-01-31 NOTE — TELEPHONE ENCOUNTER
Appointments for Cardiac MRI, echo and holter monitor scheduled with pt. Pt also reminded to schedule follow up visit with  as advised by Analia Baker NP. Pt verbalized understanding.

## 2022-05-03 ENCOUNTER — OFFICE VISIT (OUTPATIENT)
Dept: PRIMARY CARE CLINIC | Facility: CLINIC | Age: 37
End: 2022-05-03
Payer: COMMERCIAL

## 2022-05-03 VITALS
BODY MASS INDEX: 26.28 KG/M2 | DIASTOLIC BLOOD PRESSURE: 84 MMHG | WEIGHT: 204.81 LBS | HEART RATE: 62 BPM | RESPIRATION RATE: 18 BRPM | HEIGHT: 74 IN | TEMPERATURE: 98 F | OXYGEN SATURATION: 95 % | SYSTOLIC BLOOD PRESSURE: 120 MMHG

## 2022-05-03 DIAGNOSIS — E78.5 HYPERLIPIDEMIA, UNSPECIFIED HYPERLIPIDEMIA TYPE: ICD-10-CM

## 2022-05-03 DIAGNOSIS — K64.8 HEMORRHOIDS, INTERNAL: ICD-10-CM

## 2022-05-03 DIAGNOSIS — Z12.5 PROSTATE CANCER SCREENING: ICD-10-CM

## 2022-05-03 DIAGNOSIS — E03.9 HYPOTHYROIDISM, UNSPECIFIED TYPE: ICD-10-CM

## 2022-05-03 DIAGNOSIS — J30.2 SEASONAL ALLERGIES: Primary | ICD-10-CM

## 2022-05-03 DIAGNOSIS — R09.81 SINUS CONGESTION: ICD-10-CM

## 2022-05-03 PROCEDURE — 1160F RVW MEDS BY RX/DR IN RCRD: CPT | Mod: CPTII,S$GLB,, | Performed by: NURSE PRACTITIONER

## 2022-05-03 PROCEDURE — 99999 PR PBB SHADOW E&M-EST. PATIENT-LVL V: CPT | Mod: PBBFAC,,, | Performed by: NURSE PRACTITIONER

## 2022-05-03 PROCEDURE — 3079F PR MOST RECENT DIASTOLIC BLOOD PRESSURE 80-89 MM HG: ICD-10-PCS | Mod: CPTII,S$GLB,, | Performed by: NURSE PRACTITIONER

## 2022-05-03 PROCEDURE — 99215 OFFICE O/P EST HI 40 MIN: CPT | Mod: S$GLB,,, | Performed by: NURSE PRACTITIONER

## 2022-05-03 PROCEDURE — 3008F BODY MASS INDEX DOCD: CPT | Mod: CPTII,S$GLB,, | Performed by: NURSE PRACTITIONER

## 2022-05-03 PROCEDURE — 99215 PR OFFICE/OUTPT VISIT, EST, LEVL V, 40-54 MIN: ICD-10-PCS | Mod: S$GLB,,, | Performed by: NURSE PRACTITIONER

## 2022-05-03 PROCEDURE — 1160F PR REVIEW ALL MEDS BY PRESCRIBER/CLIN PHARMACIST DOCUMENTED: ICD-10-PCS | Mod: CPTII,S$GLB,, | Performed by: NURSE PRACTITIONER

## 2022-05-03 PROCEDURE — 4010F ACE/ARB THERAPY RXD/TAKEN: CPT | Mod: CPTII,S$GLB,, | Performed by: NURSE PRACTITIONER

## 2022-05-03 PROCEDURE — 99999 PR PBB SHADOW E&M-EST. PATIENT-LVL V: ICD-10-PCS | Mod: PBBFAC,,, | Performed by: NURSE PRACTITIONER

## 2022-05-03 PROCEDURE — 3074F SYST BP LT 130 MM HG: CPT | Mod: CPTII,S$GLB,, | Performed by: NURSE PRACTITIONER

## 2022-05-03 PROCEDURE — 4010F PR ACE/ARB THEARPY RXD/TAKEN: ICD-10-PCS | Mod: CPTII,S$GLB,, | Performed by: NURSE PRACTITIONER

## 2022-05-03 PROCEDURE — 3074F PR MOST RECENT SYSTOLIC BLOOD PRESSURE < 130 MM HG: ICD-10-PCS | Mod: CPTII,S$GLB,, | Performed by: NURSE PRACTITIONER

## 2022-05-03 PROCEDURE — 1159F MED LIST DOCD IN RCRD: CPT | Mod: CPTII,S$GLB,, | Performed by: NURSE PRACTITIONER

## 2022-05-03 PROCEDURE — 3008F PR BODY MASS INDEX (BMI) DOCUMENTED: ICD-10-PCS | Mod: CPTII,S$GLB,, | Performed by: NURSE PRACTITIONER

## 2022-05-03 PROCEDURE — 3079F DIAST BP 80-89 MM HG: CPT | Mod: CPTII,S$GLB,, | Performed by: NURSE PRACTITIONER

## 2022-05-03 PROCEDURE — 1159F PR MEDICATION LIST DOCUMENTED IN MEDICAL RECORD: ICD-10-PCS | Mod: CPTII,S$GLB,, | Performed by: NURSE PRACTITIONER

## 2022-05-03 RX ORDER — CETIRIZINE HYDROCHLORIDE 10 MG/1
10 TABLET ORAL DAILY
Qty: 90 TABLET | Refills: 4 | Status: SHIPPED | OUTPATIENT
Start: 2022-05-03 | End: 2022-08-01

## 2022-05-03 RX ORDER — SULFACETAMIDE SODIUM, SULFUR 100; 50 MG/G; MG/G
EMULSION TOPICAL
COMMUNITY
Start: 2022-02-01

## 2022-05-03 RX ORDER — HYDROCORTISONE ACETATE PRAMOXINE HCL 1; 1 G/100G; G/100G
CREAM TOPICAL 3 TIMES DAILY
Qty: 1 EACH | Refills: 1 | Status: SHIPPED | OUTPATIENT
Start: 2022-05-03 | End: 2023-02-15

## 2022-05-03 RX ORDER — METHYLPREDNISOLONE 4 MG/1
TABLET ORAL
Qty: 21 EACH | Refills: 0 | Status: SHIPPED | OUTPATIENT
Start: 2022-05-03 | End: 2022-05-24

## 2022-05-03 NOTE — PROGRESS NOTES
[facility] Note    Chief Complaint      Chief Complaint   Patient presents with    Annual Exam    Hemorrhoids       History of Present Illness      Kurt Shelley is a 36 y.o. male who presents today for internal/external hemorrhoids bleeding.   Patient has a past medical history of hypertrophic cardiomyopathy, abnormal ECG, osteoarthritis of left knee. He has a family of sudden cardiac death in brother.   Patient reports seeing blood when he wipes himself after a bowel movement. He denies any straining or constipation. He denies any unintentional weight loss, loss of appetite, weakness, fatigue, SOB, chest pain. He wants the bleeding to stop because it is interfering with his daily activities. He works as a  for [employer]. Patient also has sinus congestion related to seasonal allergies that has been ongoing for the past few weeks.  We spoke about the importance of following up with cardiology and having the cardiology MRI done. Patient agrees with this and will contact cardiology for a f/u appointment.  Chief Complaint   Patient presents with    Annual Exam    Hemorrhoids         Problem List Items Addressed This Visit    None     Visit Diagnoses     Seasonal allergies    -  Primary    Relevant Medications    cetirizine (ZYRTEC) 10 MG tablet    Hemorrhoids, internal        Relevant Medications    pramoxine-hydrocortisone (PROCTOCREAM-HC) 1-1 % rectal cream    Other Relevant Orders    Ambulatory referral/consult to Gastroenterology    CBC Auto Differential    Hyperlipidemia, unspecified hyperlipidemia type        Relevant Orders    Comprehensive Metabolic Panel    PSA, Screening    Hemoglobin A1C    Lipid Panel    Prostate cancer screening        Relevant Orders    PSA, Screening    Hypothyroidism, unspecified type        Relevant Orders    TSH    Sinus congestion        Relevant Medications    methylPREDNISolone (MEDROL DOSEPACK) 4 mg tablet          Review of Systems    Constitutional: Negative.    HENT: Positive for congestion.    Eyes: Negative.    Respiratory: Negative.    Cardiovascular: Negative.    Gastrointestinal: Negative for blood in stool.        + rectal bleeding from internal and external hemorrhoids.    Genitourinary: Negative.    Musculoskeletal: Negative.    Skin: Negative.    Neurological: Negative.    Endo/Heme/Allergies: Positive for environmental allergies.   Psychiatric/Behavioral: Negative.         Past Medical History:  Past Medical History:   Diagnosis Date    Allergy     Asthma     Family history of sudden cardiac death in brother 8/23/2017    Hypertension     Hypertrophic cardiomyopathy        Past Surgical History:  No past surgical history on file.    Family History:  family history includes Cancer (age of onset: 78) in his paternal grandfather; Diabetes in his mother; Early death (age of onset: 23) in his brother; Hypertension in his mother.   Family history was reviewed with patient.    Social History:  Social History     Socioeconomic History    Marital status:    Occupational History    Occupation: [email or web address]    Tobacco Use    Smoking status: Never Smoker    Smokeless tobacco: Never Used   Substance and Sexual Activity    Alcohol use: Yes     Alcohol/week: 4.0 standard drinks     Types: 3 Shots of liquor, 1 Standard drinks or equivalent per week     Comment: occ    Drug use: No    Sexual activity: Yes     Partners: Female         Medications:  Outpatient Encounter Medications as of 5/3/2022   Medication Sig Dispense Refill    cetirizine (ZYRTEC) 10 MG tablet Take 10 mg by mouth daily as needed for Allergies.      chlorthalidone (HYGROTEN) 25 MG Tab Take 0.5 tablets (12.5 mg total) by mouth once daily. 45 tablet 3    sulfacetamide sodium-sulfur 10-5 % (w/w) Clsr Apply topically.      valsartan (DIOVAN) 40 MG tablet Take 1 tablet (40 mg total) by mouth once daily. 90 tablet 3    cetirizine (ZYRTEC) 10 MG tablet Take 1 tablet  "(10 mg total) by mouth once daily. 90 tablet 4    methylPREDNISolone (MEDROL DOSEPACK) 4 mg tablet use as directed 21 each 0    pramoxine-hydrocortisone (PROCTOCREAM-HC) 1-1 % rectal cream Place rectally 3 (three) times daily. 1 each 1     No facility-administered encounter medications on file as of 5/3/2022.       Allergies:  Review of patient's allergies indicates:   Allergen Reactions    No known drug allergies        Health Maintenance:  Health Maintenance   Topic Date Due    Lipid Panel  02/25/2026    TETANUS VACCINE  10/25/2029    Hepatitis C Screening  Completed     Health Maintenance Topics with due status: Not Due       Topic Last Completion Date    TETANUS VACCINE 10/25/2019    Lipid Panel 02/25/2021       Physical Exam      Vital Signs  Temp: 98.3 °F (36.8 °C)  Pulse: 62  Resp: 18  SpO2: 95 %  BP: 120/84  BP Location: Right arm  Patient Position: Sitting  Height and Weight  Height: 6' 2" (188 cm)  Weight: 92.9 kg (204 lb 12.9 oz)  BSA (Calculated - sq m): 2.2 sq meters  BMI (Calculated): 26.3  Weight in (lb) to have BMI = 25: 194.3]    Physical Exam  Constitutional:       Appearance: Normal appearance. He is normal weight.   HENT:      Head: Normocephalic and atraumatic.      Right Ear: Tympanic membrane, ear canal and external ear normal.      Left Ear: Tympanic membrane, ear canal and external ear normal.      Nose: Congestion present.      Mouth/Throat:      Mouth: Mucous membranes are moist.      Pharynx: Oropharynx is clear.   Eyes:      Extraocular Movements: Extraocular movements intact.      Conjunctiva/sclera: Conjunctivae normal.      Pupils: Pupils are equal, round, and reactive to light.   Cardiovascular:      Rate and Rhythm: Normal rate and regular rhythm.      Pulses: Normal pulses.      Heart sounds: Normal heart sounds.   Pulmonary:      Effort: Pulmonary effort is normal.      Breath sounds: Normal breath sounds.   Abdominal:      General: Abdomen is flat. Bowel sounds are normal. "      Palpations: Abdomen is soft.   Musculoskeletal:         General: Normal range of motion.      Cervical back: Normal range of motion and neck supple.   Skin:     General: Skin is warm and dry.      Capillary Refill: Capillary refill takes less than 2 seconds.   Neurological:      General: No focal deficit present.      Mental Status: He is alert and oriented to person, place, and time. Mental status is at baseline.   Psychiatric:         Mood and Affect: Mood normal.         Behavior: Behavior normal.         Thought Content: Thought content normal.         Judgment: Judgment normal.          Laboratory:  CBC:  Recent Labs   Lab 10/29/19  0810 08/26/20  1028 02/25/21  1047   WBC 4.40 2.66 L 2.86 L   RBC 4.29 L 4.75 4.90   Hemoglobin 12.0 L 13.6 L 13.7 L   Hematocrit 37.1 L 42.0 42.8   Platelets 262 195 220   MCV 87 88 87   MCH 28.0 28.6 28.0   MCHC 32.3 32.4 32.0     CMP:  Recent Labs   Lab 10/29/19  0810 08/26/20  1028 10/15/20  1205 02/25/21  1047   Glucose 94 105   < > 98   Calcium 9.2 9.7   < > 9.5   Albumin 3.9 4.5  --  4.4   Total Protein 7.0 7.5  --  7.7   Sodium 140 137   < > 137   Potassium 4.0 4.2   < > 4.4   CO2 28 27   < > 26   Chloride 106 102   < > 102   BUN 19 18   < > 15   Alkaline Phosphatase 70 46 L  --  56   ALT 17 13  --  15   AST 23 21  --  20   Total Bilirubin 0.4 0.4  --  0.5    < > = values in this interval not displayed.     URINALYSIS:       LIPIDS:  Recent Labs   Lab 10/29/19  0810 08/26/20  1028 02/25/21  1047   TSH 1.401  --  1.403   HDL 49 66 64   Cholesterol 153 177 191   Triglycerides 33 34 33   LDL Cholesterol 97.4 104.2 120.4   HDL/Cholesterol Ratio 32.0 37.3 33.5   Non-HDL Cholesterol 104 111 127   Total Cholesterol/HDL Ratio 3.1 2.7 3.0     TSH:  Recent Labs   Lab 10/29/19  0810 02/25/21  1047   TSH 1.401 1.403     A1C:  Recent Labs   Lab 10/29/19  0810 02/25/21  1047   Hemoglobin A1C 6.1 H 5.9 H       Radiology:        Assessment/Plan     Kurt Shelley is a 36 y.o.male  with:    Seasonal allergies  -     cetirizine (ZYRTEC) 10 MG tablet; Take 1 tablet (10 mg total) by mouth once daily.  Dispense: 90 tablet; Refill: 4    Hemorrhoids, internal  -     pramoxine-hydrocortisone (PROCTOCREAM-HC) 1-1 % rectal cream; Place rectally 3 (three) times daily.  Dispense: 1 each; Refill: 1  -     Ambulatory referral/consult to Gastroenterology; Future; Expected date: 05/10/2022  -     CBC Auto Differential; Future; Expected date: 05/03/2022    Hyperlipidemia, unspecified hyperlipidemia type  -     Comprehensive Metabolic Panel; Future; Expected date: 05/03/2022  -     PSA, Screening; Future; Expected date: 05/03/2022  -     Hemoglobin A1C; Future; Expected date: 05/03/2022  -     Lipid Panel; Future; Expected date: 05/03/2022    Prostate cancer screening  -     PSA, Screening; Future; Expected date: 05/03/2022    Hypothyroidism, unspecified type  -     TSH; Future; Expected date: 05/03/2022    Sinus congestion  -     methylPREDNISolone (MEDROL DOSEPACK) 4 mg tablet; use as directed  Dispense: 21 each; Refill: 0    1) Rectal bleeding: Internal and external hemorrhoids:  - Refer to Gastroenterology surgeon  - CBC  - Promoxine/hydrocortisone cream as needed to area.  - Witch hazel wipes  - Keep area clean and dry.    2) Prostate cancer screening (grandfather had prostate cancer):  - PSA    3) Cardiac disease:  - CMP  - Lipid panel    4) Fatigue:  - TSH    5) Sinus congestion:  - Medrol dose pack - take as directed    6) Seasonal allergies:  - Cetirizine 10mg by mouth every night.    7) Hypertrophic cardiomyopathy:  - Make an appointment with cardiologist ASAP.  - Reschedule cardiac MRI.      As above, continue current medications and maintain follow up with specialists.  Return to clinic as needed.    I spent 40 minutes on the day of this encounter for preparing, evaluating, examining treating, discussing plan of care, and managing this patient.  50% of this time was spent face to face with patient.   All questions were answered to patient's satisfaction.        Karen L Spencer, NP-C [facility]

## 2022-05-03 NOTE — PATIENT INSTRUCTIONS
1) Rectal bleeding: Internal and external hemorrhoids:  - Refer to Gastroenterology surgeon  - CBC  - Promoxine/hydrocortisone cream as needed to area.  - Witch hazel wipes  - Keep area clean and dry.    2) Prostate cancer screening (grandfather had prostate cancer):  - PSA    3) Cardiac disease:  - CMP  - Lipid panel    4) Fatigue:  - TSH    5) Sinus congestion:  - Medrol dose pack - take as directed    6) Seasonal allergies:  - Cetirizine 10mg by mouth every night.

## 2022-05-04 ENCOUNTER — LAB VISIT (OUTPATIENT)
Dept: LAB | Facility: HOSPITAL | Age: 37
End: 2022-05-04
Attending: NURSE PRACTITIONER
Payer: COMMERCIAL

## 2022-05-04 DIAGNOSIS — Z12.5 PROSTATE CANCER SCREENING: ICD-10-CM

## 2022-05-04 DIAGNOSIS — E03.9 HYPOTHYROIDISM, UNSPECIFIED TYPE: ICD-10-CM

## 2022-05-04 DIAGNOSIS — E78.5 HYPERLIPIDEMIA, UNSPECIFIED HYPERLIPIDEMIA TYPE: ICD-10-CM

## 2022-05-04 DIAGNOSIS — K64.8 HEMORRHOIDS, INTERNAL: ICD-10-CM

## 2022-05-04 LAB
ALBUMIN SERPL BCP-MCNC: 4.2 G/DL (ref 3.5–5.2)
ALP SERPL-CCNC: 58 U/L (ref 55–135)
ALT SERPL W/O P-5'-P-CCNC: 12 U/L (ref 10–44)
ANION GAP SERPL CALC-SCNC: 8 MMOL/L (ref 8–16)
AST SERPL-CCNC: 22 U/L (ref 10–40)
BASOPHILS # BLD AUTO: 0.03 K/UL (ref 0–0.2)
BASOPHILS NFR BLD: 1 % (ref 0–1.9)
BILIRUB SERPL-MCNC: 0.8 MG/DL (ref 0.1–1)
BUN SERPL-MCNC: 22 MG/DL (ref 6–20)
CALCIUM SERPL-MCNC: 9.2 MG/DL (ref 8.7–10.5)
CHLORIDE SERPL-SCNC: 102 MMOL/L (ref 95–110)
CHOLEST SERPL-MCNC: 179 MG/DL (ref 120–199)
CHOLEST/HDLC SERPL: 3.4 {RATIO} (ref 2–5)
CO2 SERPL-SCNC: 28 MMOL/L (ref 23–29)
COMPLEXED PSA SERPL-MCNC: 0.56 NG/ML (ref 0–4)
CREAT SERPL-MCNC: 1.2 MG/DL (ref 0.5–1.4)
DIFFERENTIAL METHOD: ABNORMAL
EOSINOPHIL # BLD AUTO: 0.1 K/UL (ref 0–0.5)
EOSINOPHIL NFR BLD: 1.9 % (ref 0–8)
ERYTHROCYTE [DISTWIDTH] IN BLOOD BY AUTOMATED COUNT: 13.2 % (ref 11.5–14.5)
EST. GFR  (AFRICAN AMERICAN): >60 ML/MIN/1.73 M^2
EST. GFR  (NON AFRICAN AMERICAN): >60 ML/MIN/1.73 M^2
ESTIMATED AVG GLUCOSE: 123 MG/DL (ref 68–131)
GLUCOSE SERPL-MCNC: 92 MG/DL (ref 70–110)
HBA1C MFR BLD: 5.9 % (ref 4–5.6)
HCT VFR BLD AUTO: 41.9 % (ref 40–54)
HDLC SERPL-MCNC: 53 MG/DL (ref 40–75)
HDLC SERPL: 29.6 % (ref 20–50)
HGB BLD-MCNC: 13.8 G/DL (ref 14–18)
IMM GRANULOCYTES # BLD AUTO: 0 K/UL (ref 0–0.04)
IMM GRANULOCYTES NFR BLD AUTO: 0 % (ref 0–0.5)
LDLC SERPL CALC-MCNC: 119.8 MG/DL (ref 63–159)
LYMPHOCYTES # BLD AUTO: 1.9 K/UL (ref 1–4.8)
LYMPHOCYTES NFR BLD: 59.9 % (ref 18–48)
MCH RBC QN AUTO: 28 PG (ref 27–31)
MCHC RBC AUTO-ENTMCNC: 32.9 G/DL (ref 32–36)
MCV RBC AUTO: 85 FL (ref 82–98)
MONOCYTES # BLD AUTO: 0.3 K/UL (ref 0.3–1)
MONOCYTES NFR BLD: 9.7 % (ref 4–15)
NEUTROPHILS # BLD AUTO: 0.9 K/UL (ref 1.8–7.7)
NEUTROPHILS NFR BLD: 27.5 % (ref 38–73)
NONHDLC SERPL-MCNC: 126 MG/DL
NRBC BLD-RTO: 0 /100 WBC
PLATELET # BLD AUTO: 202 K/UL (ref 150–450)
PMV BLD AUTO: 11.3 FL (ref 9.2–12.9)
POTASSIUM SERPL-SCNC: 4.2 MMOL/L (ref 3.5–5.1)
PROT SERPL-MCNC: 7.3 G/DL (ref 6–8.4)
RBC # BLD AUTO: 4.92 M/UL (ref 4.6–6.2)
SODIUM SERPL-SCNC: 138 MMOL/L (ref 136–145)
TRIGL SERPL-MCNC: 31 MG/DL (ref 30–150)
TSH SERPL DL<=0.005 MIU/L-ACNC: 0.93 UIU/ML (ref 0.4–4)
WBC # BLD AUTO: 3.09 K/UL (ref 3.9–12.7)

## 2022-05-04 PROCEDURE — 85025 COMPLETE CBC W/AUTO DIFF WBC: CPT | Performed by: NURSE PRACTITIONER

## 2022-05-04 PROCEDURE — 84153 ASSAY OF PSA TOTAL: CPT | Performed by: NURSE PRACTITIONER

## 2022-05-04 PROCEDURE — 80061 LIPID PANEL: CPT | Performed by: NURSE PRACTITIONER

## 2022-05-04 PROCEDURE — 83036 HEMOGLOBIN GLYCOSYLATED A1C: CPT | Performed by: NURSE PRACTITIONER

## 2022-05-04 PROCEDURE — 80053 COMPREHEN METABOLIC PANEL: CPT | Performed by: NURSE PRACTITIONER

## 2022-05-04 PROCEDURE — 36415 COLL VENOUS BLD VENIPUNCTURE: CPT | Mod: PO | Performed by: NURSE PRACTITIONER

## 2022-05-04 PROCEDURE — 84443 ASSAY THYROID STIM HORMONE: CPT | Performed by: NURSE PRACTITIONER

## 2022-11-30 DIAGNOSIS — I10 ESSENTIAL HYPERTENSION: Chronic | ICD-10-CM

## 2022-12-02 DIAGNOSIS — I42.2 HYPERTROPHIC CARDIOMYOPATHY: Primary | ICD-10-CM

## 2022-12-02 RX ORDER — CHLORTHALIDONE 25 MG/1
TABLET ORAL
Qty: 45 TABLET | Refills: 0 | Status: SHIPPED | OUTPATIENT
Start: 2022-12-02 | End: 2023-09-12

## 2022-12-02 RX ORDER — VALSARTAN 40 MG/1
TABLET ORAL
Qty: 90 TABLET | Refills: 0 | Status: SHIPPED | OUTPATIENT
Start: 2022-12-02 | End: 2023-02-15 | Stop reason: SDUPTHER

## 2023-02-01 DIAGNOSIS — I42.2 HYPERTROPHIC CARDIOMYOPATHY: Primary | ICD-10-CM

## 2023-02-01 DIAGNOSIS — I10 ESSENTIAL HYPERTENSION: ICD-10-CM

## 2023-02-14 ENCOUNTER — LAB VISIT (OUTPATIENT)
Dept: LAB | Facility: OTHER | Age: 38
End: 2023-02-14
Attending: INTERNAL MEDICINE
Payer: OTHER GOVERNMENT

## 2023-02-14 DIAGNOSIS — I42.2 HYPERTROPHIC CARDIOMYOPATHY: ICD-10-CM

## 2023-02-14 DIAGNOSIS — I10 ESSENTIAL HYPERTENSION: ICD-10-CM

## 2023-02-14 LAB
ALBUMIN SERPL BCP-MCNC: 4.2 G/DL (ref 3.5–5.2)
ALP SERPL-CCNC: 66 U/L (ref 55–135)
ALT SERPL W/O P-5'-P-CCNC: 12 U/L (ref 10–44)
ANION GAP SERPL CALC-SCNC: 4 MMOL/L (ref 8–16)
AST SERPL-CCNC: 19 U/L (ref 10–40)
BILIRUB SERPL-MCNC: 0.6 MG/DL (ref 0.1–1)
BUN SERPL-MCNC: 18 MG/DL (ref 6–20)
CALCIUM SERPL-MCNC: 9.3 MG/DL (ref 8.7–10.5)
CHLORIDE SERPL-SCNC: 105 MMOL/L (ref 95–110)
CHOLEST SERPL-MCNC: 178 MG/DL (ref 120–199)
CHOLEST/HDLC SERPL: 3.4 {RATIO} (ref 2–5)
CO2 SERPL-SCNC: 31 MMOL/L (ref 23–29)
CREAT SERPL-MCNC: 1.1 MG/DL (ref 0.5–1.4)
EST. GFR  (NO RACE VARIABLE): >60 ML/MIN/1.73 M^2
GLUCOSE SERPL-MCNC: 94 MG/DL (ref 70–110)
HDLC SERPL-MCNC: 53 MG/DL (ref 40–75)
HDLC SERPL: 29.8 % (ref 20–50)
LDLC SERPL CALC-MCNC: 116.4 MG/DL (ref 63–159)
NONHDLC SERPL-MCNC: 125 MG/DL
POTASSIUM SERPL-SCNC: 3.9 MMOL/L (ref 3.5–5.1)
PROT SERPL-MCNC: 7.4 G/DL (ref 6–8.4)
SODIUM SERPL-SCNC: 140 MMOL/L (ref 136–145)
TRIGL SERPL-MCNC: 43 MG/DL (ref 30–150)

## 2023-02-14 PROCEDURE — 80061 LIPID PANEL: CPT | Performed by: INTERNAL MEDICINE

## 2023-02-14 PROCEDURE — 36415 COLL VENOUS BLD VENIPUNCTURE: CPT | Performed by: INTERNAL MEDICINE

## 2023-02-14 PROCEDURE — 80053 COMPREHEN METABOLIC PANEL: CPT | Performed by: INTERNAL MEDICINE

## 2023-02-15 ENCOUNTER — OFFICE VISIT (OUTPATIENT)
Dept: CARDIOLOGY | Facility: CLINIC | Age: 38
End: 2023-02-15
Payer: COMMERCIAL

## 2023-02-15 VITALS
SYSTOLIC BLOOD PRESSURE: 138 MMHG | BODY MASS INDEX: 27.47 KG/M2 | HEART RATE: 66 BPM | HEIGHT: 74 IN | WEIGHT: 214.06 LBS | DIASTOLIC BLOOD PRESSURE: 90 MMHG

## 2023-02-15 DIAGNOSIS — I42.2 HYPERTROPHIC CARDIOMYOPATHY: Primary | Chronic | ICD-10-CM

## 2023-02-15 DIAGNOSIS — I10 ESSENTIAL HYPERTENSION: Chronic | ICD-10-CM

## 2023-02-15 PROCEDURE — 1159F PR MEDICATION LIST DOCUMENTED IN MEDICAL RECORD: ICD-10-PCS | Mod: CPTII,S$GLB,, | Performed by: PHYSICIAN ASSISTANT

## 2023-02-15 PROCEDURE — 1159F MED LIST DOCD IN RCRD: CPT | Mod: CPTII,S$GLB,, | Performed by: PHYSICIAN ASSISTANT

## 2023-02-15 PROCEDURE — 3080F DIAST BP >= 90 MM HG: CPT | Mod: CPTII,S$GLB,, | Performed by: PHYSICIAN ASSISTANT

## 2023-02-15 PROCEDURE — 99214 PR OFFICE/OUTPT VISIT, EST, LEVL IV, 30-39 MIN: ICD-10-PCS | Mod: S$GLB,,, | Performed by: PHYSICIAN ASSISTANT

## 2023-02-15 PROCEDURE — 3075F PR MOST RECENT SYSTOLIC BLOOD PRESS GE 130-139MM HG: ICD-10-PCS | Mod: CPTII,S$GLB,, | Performed by: PHYSICIAN ASSISTANT

## 2023-02-15 PROCEDURE — 99999 PR PBB SHADOW E&M-EST. PATIENT-LVL IV: CPT | Mod: PBBFAC,,, | Performed by: PHYSICIAN ASSISTANT

## 2023-02-15 PROCEDURE — 99999 PR PBB SHADOW E&M-EST. PATIENT-LVL IV: ICD-10-PCS | Mod: PBBFAC,,, | Performed by: PHYSICIAN ASSISTANT

## 2023-02-15 PROCEDURE — 4010F ACE/ARB THERAPY RXD/TAKEN: CPT | Mod: CPTII,S$GLB,, | Performed by: PHYSICIAN ASSISTANT

## 2023-02-15 PROCEDURE — 3008F BODY MASS INDEX DOCD: CPT | Mod: CPTII,S$GLB,, | Performed by: PHYSICIAN ASSISTANT

## 2023-02-15 PROCEDURE — 1160F PR REVIEW ALL MEDS BY PRESCRIBER/CLIN PHARMACIST DOCUMENTED: ICD-10-PCS | Mod: CPTII,S$GLB,, | Performed by: PHYSICIAN ASSISTANT

## 2023-02-15 PROCEDURE — 3008F PR BODY MASS INDEX (BMI) DOCUMENTED: ICD-10-PCS | Mod: CPTII,S$GLB,, | Performed by: PHYSICIAN ASSISTANT

## 2023-02-15 PROCEDURE — 4010F PR ACE/ARB THEARPY RXD/TAKEN: ICD-10-PCS | Mod: CPTII,S$GLB,, | Performed by: PHYSICIAN ASSISTANT

## 2023-02-15 PROCEDURE — 99214 OFFICE O/P EST MOD 30 MIN: CPT | Mod: S$GLB,,, | Performed by: PHYSICIAN ASSISTANT

## 2023-02-15 PROCEDURE — 1160F RVW MEDS BY RX/DR IN RCRD: CPT | Mod: CPTII,S$GLB,, | Performed by: PHYSICIAN ASSISTANT

## 2023-02-15 PROCEDURE — 3080F PR MOST RECENT DIASTOLIC BLOOD PRESSURE >= 90 MM HG: ICD-10-PCS | Mod: CPTII,S$GLB,, | Performed by: PHYSICIAN ASSISTANT

## 2023-02-15 PROCEDURE — 3075F SYST BP GE 130 - 139MM HG: CPT | Mod: CPTII,S$GLB,, | Performed by: PHYSICIAN ASSISTANT

## 2023-02-15 RX ORDER — VALSARTAN 40 MG/1
40 TABLET ORAL 2 TIMES DAILY
Qty: 180 TABLET | Refills: 3 | Status: SHIPPED | OUTPATIENT
Start: 2023-02-15 | End: 2023-11-06 | Stop reason: SDUPTHER

## 2023-02-15 NOTE — PATIENT INSTRUCTIONS
Increase Valsartan to 40 mg twice daily    Continue Chlorthalidone 12.5 mg daily     Take your blood pressure each morning and evening after sitting quietly for at least 5 minutes.  Record the blood pressure, pulse, date and time (AM or PM) . After two weeks, send the the results all together to Rosalie Celestin PA-C.

## 2023-02-15 NOTE — PROGRESS NOTES
General Cardiology Clinic Note  Reason for Visit: Follow up   Last Clinic Visit: 10/27/2021 with Analia Baker  General Cardiologist: Dr. Mcbride    HPI:   Kurt Shelley is a 37 y.o. male who presents for Cardiomyopathy    Problems  HTN  Asthma  Hypertrophic cardiomyopathy  Family h/o early heart dz: brother CHF death at 23    Interval HPI  Patient presents for follow up. He has had no change in functional capacity. He states he could run 10 miles right now if he tried. He has chronic nonsustained palpitations that last seconds. No change in this. He denies CP, WAN, orthopnea, PND, pedal edema, syncope, near syncope, sustained palpitations. He cut back on the amount of cardio he was doing and has since gained about 15 lbs since his last visit. He does not check BP at home.    10/27/2021 HPI (Analia Baker)  Mr. Shelley is in clinic today for routine follow up.  Patient denies chest pain with exertion or at rest, palpitations, SOB, WAN, dizziness, syncope, edema, orthopnea, PND, or claudication.  Reports not running after seeing Dr. Parekh who advised against it.  However, he states that the swimming is much harder on him than the running and he does not understand why he can not run.      He was seen by Dr. Parekh in transplant on 20 who recommended repeat cardiac MRI to document progression.  He does not think that the amount of hypertrophy is c/w the duration of his HTN.  He was also interested in a copy of the autopsy for his brother who  suddenly.  I discussed with Kurt and he has not had a chance to get the autopsy report.  We discussed why it would help with understanding if his condition has a genetic component.  He was suppose to get a cardiac MRI, holter and TTE but has not completed these studies.      ROS:      Review of Systems   Constitutional: Negative for diaphoresis, malaise/fatigue, weight gain and weight loss.   HENT:  Negative for nosebleeds.    Eyes:  Negative for vision loss  in left eye, vision loss in right eye and visual disturbance.   Cardiovascular:  Positive for palpitations (nonsustained). Negative for chest pain, claudication, dyspnea on exertion, irregular heartbeat, leg swelling, near-syncope, orthopnea, paroxysmal nocturnal dyspnea and syncope.   Respiratory:  Negative for cough, shortness of breath, sleep disturbances due to breathing, snoring and wheezing.    Hematologic/Lymphatic: Negative for bleeding problem. Does not bruise/bleed easily.   Skin:  Negative for poor wound healing and rash.   Musculoskeletal:  Negative for muscle cramps and myalgias.   Gastrointestinal:  Negative for bloating, abdominal pain, diarrhea, heartburn, melena, nausea and vomiting.   Genitourinary:  Negative for hematuria and nocturia.   Neurological:  Negative for brief paralysis, dizziness, headaches, light-headedness, numbness and weakness.   Psychiatric/Behavioral:  Negative for depression.    Allergic/Immunologic: Negative for hives.     PMH:     Past Medical History:   Diagnosis Date    Allergy     Asthma     Family history of sudden cardiac death in brother 8/23/2017    Hypertension     Hypertrophic cardiomyopathy      History reviewed. No pertinent surgical history.  Allergies:     Review of patient's allergies indicates:   Allergen Reactions    No known drug allergies      Medications:     Current Outpatient Medications on File Prior to Visit   Medication Sig Dispense Refill    cetirizine (ZYRTEC) 10 MG tablet Take 10 mg by mouth daily as needed for Allergies.      chlorthalidone (HYGROTEN) 25 MG Tab TAKE 1/2 TABLET BY MOUTH DAILY 45 tablet 0    valsartan (DIOVAN) 40 MG tablet TAKE 1 TABLET(40 MG) BY MOUTH EVERY DAY 90 tablet 0    sulfacetamide sodium-sulfur 10-5 % (w/w) Clsr Apply topically.      [DISCONTINUED] pramoxine-hydrocortisone (PROCTOCREAM-HC) 1-1 % rectal cream Place rectally 3 (three) times daily. (Patient not taking: Reported on 2/15/2023) 1 each 1     No current  "facility-administered medications on file prior to visit.     Social History:     Social History     Tobacco Use    Smoking status: Never    Smokeless tobacco: Never   Substance Use Topics    Alcohol use: Yes     Alcohol/week: 4.0 standard drinks     Types: 3 Shots of liquor, 1 Standard drinks or equivalent per week     Comment: occ     Family History:     Family History   Problem Relation Age of Onset    Diabetes Mother     Hypertension Mother     Early death Brother 23        Sleep apnea; obese; SCD--says "heart failure" was on death certificate    Cancer Paternal Grandfather 78        colon ca and prostate ca     Physical Exam:   BP (!) 138/90 (BP Location: Left arm, Patient Position: Sitting)   Pulse 66   Ht 6' 2" (1.88 m)   Wt 97.1 kg (214 lb 1.1 oz)   BMI 27.48 kg/m²        Physical Exam  Vitals and nursing note reviewed.   Constitutional:       General: He is not in acute distress.     Appearance: Normal appearance.   HENT:      Head: Normocephalic and atraumatic.      Nose: Nose normal.   Eyes:      General: No scleral icterus.     Extraocular Movements: Extraocular movements intact.      Conjunctiva/sclera: Conjunctivae normal.   Neck:      Thyroid: No thyromegaly.      Vascular: No carotid bruit or JVD.   Cardiovascular:      Rate and Rhythm: Normal rate and regular rhythm.      Pulses: Normal pulses.      Heart sounds: Normal heart sounds. No murmur heard.    No friction rub. No gallop.   Pulmonary:      Effort: Pulmonary effort is normal.      Breath sounds: Normal breath sounds. No wheezing, rhonchi or rales.   Chest:      Chest wall: No tenderness.   Abdominal:      General: Bowel sounds are normal. There is no distension.      Palpations: Abdomen is soft.      Tenderness: There is no abdominal tenderness.   Musculoskeletal:      Cervical back: Neck supple.      Right lower leg: No edema.      Left lower leg: No edema.   Skin:     General: Skin is warm and dry.      Coloration: Skin is not pale.    "   Findings: No erythema or rash.      Nails: There is no clubbing.   Neurological:      Mental Status: He is alert and oriented to person, place, and time.   Psychiatric:         Mood and Affect: Mood and affect normal.         Behavior: Behavior normal.        Labs:     Lab Results   Component Value Date     02/14/2023    K 3.9 02/14/2023     02/14/2023    CO2 31 (H) 02/14/2023    BUN 18 02/14/2023    CREATININE 1.1 02/14/2023    ANIONGAP 4 (L) 02/14/2023     Lab Results   Component Value Date    HGBA1C 5.9 (H) 05/04/2022     Lab Results   Component Value Date    BNP 16 10/15/2020    Lab Results   Component Value Date    WBC 3.09 (L) 05/04/2022    HGB 13.8 (L) 05/04/2022    HCT 41.9 05/04/2022     05/04/2022    GRAN 0.9 (L) 05/04/2022    GRAN 27.5 (L) 05/04/2022     Lab Results   Component Value Date    CHOL 178 02/14/2023    HDL 53 02/14/2023    LDLCALC 116.4 02/14/2023    TRIG 43 02/14/2023          Imaging:   Echocardiograms:   TTE 8/26/2020  Moderate left atrial enlargement.  Thickened walls (with deep crypts) consistent with hypertrophic cardiomyopathy.  No systolic anterior motion of the mitral valve, asymmetric septal hypertrophy or LV outflow gradient.  Normal left ventricular systolic function. The estimated ejection fraction is 60%.  The left ventricular global longitudinal strain is -15%.  Indeterminate left ventricular diastolic function.  Normal right ventricular systolic function.  Mild pulmonic regurgitation.  The estimated PA systolic pressure is 29 mmHg.  Intermediate central venous pressure (8 mmHg).    TTE 7/13/2017    1 - Normal left ventricular systolic function (EF 65-70%).     2 - No wall motion abnormalities.     3 - Concentric hypertrophy.     4 - There is a borderline increased density to the myocardium, raising a  possibility of a myocardial infiltrative process (clinical correlation required).     5 - Normal left ventricular diastolic function.     6 - Right atrial  enlargement.     7 - Right ventricle is upper limit of normal in size with hypertrophy, with normal systolic function.     8 - Trivial mitral regurgitation.     9 - Trivial to mild tricuspid regurgitation.     10 - Trivial to mild pulmonic regurgitation.     11 - The estimated PA systolic pressure is 30 mmHg.     Stress Tests:   Exercise EKG 10/28/2020    The EKG portion of this study is negative for ischemia.  Minimal accentuation of significant baseline ST/T abnormality noted.    The patient reported no chest pain during the stress test.    The blood pressure response to stress was normal.    There were no arrhythmias during stress.    The exercise capacity was excellent (20 METs)    Caths:   None    Other:  Cardiac MRI 7/31/2017  1. Normal LV volume with LVEF of  71%. Upper limits of LV mass with maximum wall thickness of 12mm. There is cavity obliteration noted of the mid LV to apex with systole with myocardial crypts noted in the basal anterolateral and inferolateral walls. No   DHE noted. At this time, unable to fit the definition of a specific CM.   2. Normal RV volume with RVEF of  63%.       EKG 10/27/2021: NSR, LVH, inferior and lateral TWIs    Assessment:     1. Hypertrophic cardiomyopathy suspected     2. Essential hypertension      Plan:     Hypertrophic cardiomyopathy   Asymptomatic  Follow up with Dr. Parekh     Hypertension  Uncontrolled  Increase Valsartan to 40 mg bid  Continue Chlorthalidone 12.5 mg daily       Signed:  Rosalie Celestin PA-C  Cardiology   [phone] - General  2/15/2023 4:30 PM

## 2023-02-27 ENCOUNTER — TELEPHONE (OUTPATIENT)
Dept: TRANSPLANT | Facility: CLINIC | Age: 38
End: 2023-02-27
Payer: OTHER GOVERNMENT

## 2023-02-27 DIAGNOSIS — I50.9 CONGESTIVE HEART FAILURE, UNSPECIFIED HF CHRONICITY, UNSPECIFIED HEART FAILURE TYPE: Primary | ICD-10-CM

## 2023-03-14 ENCOUNTER — LAB VISIT (OUTPATIENT)
Dept: LAB | Facility: HOSPITAL | Age: 38
End: 2023-03-14
Attending: INTERNAL MEDICINE
Payer: COMMERCIAL

## 2023-03-14 ENCOUNTER — OFFICE VISIT (OUTPATIENT)
Dept: TRANSPLANT | Facility: CLINIC | Age: 38
End: 2023-03-14
Attending: INTERNAL MEDICINE
Payer: COMMERCIAL

## 2023-03-14 VITALS
DIASTOLIC BLOOD PRESSURE: 87 MMHG | WEIGHT: 218.69 LBS | HEART RATE: 69 BPM | HEIGHT: 74 IN | SYSTOLIC BLOOD PRESSURE: 135 MMHG | BODY MASS INDEX: 28.07 KG/M2

## 2023-03-14 DIAGNOSIS — I42.2 HYPERTROPHIC CARDIOMYOPATHY: Primary | Chronic | ICD-10-CM

## 2023-03-14 DIAGNOSIS — Z14.8 HEMOCHROMATOSIS CARRIER: ICD-10-CM

## 2023-03-14 DIAGNOSIS — I50.9 CONGESTIVE HEART FAILURE, UNSPECIFIED HF CHRONICITY, UNSPECIFIED HEART FAILURE TYPE: ICD-10-CM

## 2023-03-14 DIAGNOSIS — I10 ESSENTIAL HYPERTENSION: ICD-10-CM

## 2023-03-14 DIAGNOSIS — I42.2 HYPERTROPHIC CARDIOMYOPATHY: ICD-10-CM

## 2023-03-14 LAB
ALBUMIN SERPL BCP-MCNC: 3.9 G/DL (ref 3.5–5.2)
ALP SERPL-CCNC: 64 U/L (ref 55–135)
ALT SERPL W/O P-5'-P-CCNC: 12 U/L (ref 10–44)
ANION GAP SERPL CALC-SCNC: 5 MMOL/L (ref 8–16)
AST SERPL-CCNC: 17 U/L (ref 10–40)
BILIRUB SERPL-MCNC: 0.3 MG/DL (ref 0.1–1)
BNP SERPL-MCNC: 16 PG/ML (ref 0–99)
BUN SERPL-MCNC: 15 MG/DL (ref 6–20)
CALCIUM SERPL-MCNC: 9.3 MG/DL (ref 8.7–10.5)
CHLORIDE SERPL-SCNC: 106 MMOL/L (ref 95–110)
CO2 SERPL-SCNC: 30 MMOL/L (ref 23–29)
CREAT SERPL-MCNC: 1.1 MG/DL (ref 0.5–1.4)
EST. GFR  (NO RACE VARIABLE): >60 ML/MIN/1.73 M^2
GLUCOSE SERPL-MCNC: 108 MG/DL (ref 70–110)
MAGNESIUM SERPL-MCNC: 1.7 MG/DL (ref 1.6–2.6)
POTASSIUM SERPL-SCNC: 4.4 MMOL/L (ref 3.5–5.1)
PROT SERPL-MCNC: 6.9 G/DL (ref 6–8.4)
SODIUM SERPL-SCNC: 141 MMOL/L (ref 136–145)
TSH SERPL DL<=0.005 MIU/L-ACNC: 1.42 UIU/ML (ref 0.4–4)

## 2023-03-14 PROCEDURE — 99214 OFFICE O/P EST MOD 30 MIN: CPT | Mod: S$GLB,,, | Performed by: INTERNAL MEDICINE

## 2023-03-14 PROCEDURE — 3075F PR MOST RECENT SYSTOLIC BLOOD PRESS GE 130-139MM HG: ICD-10-PCS | Mod: CPTII,S$GLB,, | Performed by: INTERNAL MEDICINE

## 2023-03-14 PROCEDURE — 99214 PR OFFICE/OUTPT VISIT, EST, LEVL IV, 30-39 MIN: ICD-10-PCS | Mod: S$GLB,,, | Performed by: INTERNAL MEDICINE

## 2023-03-14 PROCEDURE — 3075F SYST BP GE 130 - 139MM HG: CPT | Mod: CPTII,S$GLB,, | Performed by: INTERNAL MEDICINE

## 2023-03-14 PROCEDURE — 4010F PR ACE/ARB THEARPY RXD/TAKEN: ICD-10-PCS | Mod: CPTII,S$GLB,, | Performed by: INTERNAL MEDICINE

## 2023-03-14 PROCEDURE — 3008F BODY MASS INDEX DOCD: CPT | Mod: CPTII,S$GLB,, | Performed by: INTERNAL MEDICINE

## 2023-03-14 PROCEDURE — 83880 ASSAY OF NATRIURETIC PEPTIDE: CPT | Mod: 91 | Performed by: INTERNAL MEDICINE

## 2023-03-14 PROCEDURE — 1159F MED LIST DOCD IN RCRD: CPT | Mod: CPTII,S$GLB,, | Performed by: INTERNAL MEDICINE

## 2023-03-14 PROCEDURE — 3008F PR BODY MASS INDEX (BMI) DOCUMENTED: ICD-10-PCS | Mod: CPTII,S$GLB,, | Performed by: INTERNAL MEDICINE

## 2023-03-14 PROCEDURE — 3079F PR MOST RECENT DIASTOLIC BLOOD PRESSURE 80-89 MM HG: ICD-10-PCS | Mod: CPTII,S$GLB,, | Performed by: INTERNAL MEDICINE

## 2023-03-14 PROCEDURE — 80053 COMPREHEN METABOLIC PANEL: CPT | Performed by: INTERNAL MEDICINE

## 2023-03-14 PROCEDURE — 99999 PR PBB SHADOW E&M-EST. PATIENT-LVL IV: ICD-10-PCS | Mod: PBBFAC,,, | Performed by: INTERNAL MEDICINE

## 2023-03-14 PROCEDURE — 83880 ASSAY OF NATRIURETIC PEPTIDE: CPT | Performed by: INTERNAL MEDICINE

## 2023-03-14 PROCEDURE — 3079F DIAST BP 80-89 MM HG: CPT | Mod: CPTII,S$GLB,, | Performed by: INTERNAL MEDICINE

## 2023-03-14 PROCEDURE — 99999 PR PBB SHADOW E&M-EST. PATIENT-LVL IV: CPT | Mod: PBBFAC,,, | Performed by: INTERNAL MEDICINE

## 2023-03-14 PROCEDURE — 1160F PR REVIEW ALL MEDS BY PRESCRIBER/CLIN PHARMACIST DOCUMENTED: ICD-10-PCS | Mod: CPTII,S$GLB,, | Performed by: INTERNAL MEDICINE

## 2023-03-14 PROCEDURE — 1159F PR MEDICATION LIST DOCUMENTED IN MEDICAL RECORD: ICD-10-PCS | Mod: CPTII,S$GLB,, | Performed by: INTERNAL MEDICINE

## 2023-03-14 PROCEDURE — 83735 ASSAY OF MAGNESIUM: CPT | Performed by: INTERNAL MEDICINE

## 2023-03-14 PROCEDURE — 4010F ACE/ARB THERAPY RXD/TAKEN: CPT | Mod: CPTII,S$GLB,, | Performed by: INTERNAL MEDICINE

## 2023-03-14 PROCEDURE — 1160F RVW MEDS BY RX/DR IN RCRD: CPT | Mod: CPTII,S$GLB,, | Performed by: INTERNAL MEDICINE

## 2023-03-14 PROCEDURE — 84443 ASSAY THYROID STIM HORMONE: CPT | Performed by: INTERNAL MEDICINE

## 2023-03-14 NOTE — PROGRESS NOTES
"Subjective:     Patient ID:  Kurt Shelley is a 37 y.o. male who presents for evaluation of Congestive Heart Failure    HPI:  36 yo BM referred originally by Dr. Flores and for today's visit by Dr. Mcbride for evaluation of hypertrophic CM.  He was in the  and recalls at age 16 or 16 yo was told BP elevated but no medication prescribed.  He was told of an abnormal EKG age 19.  He was not officially dx with and placed on treatment for HBP until 2018.  He has no documented family hx of HCM though did have a brother who  suddenly at age 23 he was obese with LUIS FELIPE.  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      At his visit with me 2020 we discussed repeating his cardiac MRI but that was not performed.  He did see genetics and per their  note he did not have a genetic mutation identified.  They suggested he under go repeat genetic testing for hypertrophic cardiomyopathy genome in 2-3 years thus would be due now.    At his visit in  I discussed the following:  The dx of HCM requires LVH >1.5 cm in the absence of HBP or lesser degree of 1.2 cm if known family hx of HCM.  In his case this is difficult dx to make first because we have no definite family hx of HCM and second, since we know he had HBP at young age in  (while in [place]) but he was not started on BP treatment until age 19.  It was at that time in  that he was told that he had an abnormal EKG but no other details known.  [facility]2020 they note abnormal EKG ("non-specific abnormal") is only info on problem list.  He reports BP was controlled after treatment initiated 16 yrs ago but found some records where BP was elevated:  Row Name  12   0413  12   0248  12   0138  11   0352  11   0433   Enc Vitals   BP  131/86  152/101Abnormal   154/100Abnormal   125/86  137/90     There are other " visits in 2012 BP reasonably controlled as well as 2015, 2016, 2017.  ED visit 6/23/2017 /94 but 6/29/17 office visit /84.  Visit 7/10/17 -140/84.  On 8/11/17 visit with Heme /90 but 8/23/17 visit in Cards -134/79.  Cards visit 5/2/2018 -127/63-67.  On 9/13/18 /89, 3/15/19 /86, 9/26/19 /84, 10/25/19 /74, [facility] note /94 in 10/15/20 summary note but not sure of date BP taken, 4/9/20 ED visit /93, 8/26/20 /70, 9/3/20 /73.    In his case I feel our best chance to make a definite dx would be to demonstrate progressive LVH over the past 3 years.  This would clearly be more consistent with HCM progression than LVH due to HBP with documented readings over this time frame.  Even without this, it seems unlikely that his HBP is resulting in significant LVH and the markedly abnormal EKG as for that to be the case he would have had uncontrolled HBP likely before and definitely during his  career.  Could the  missed this on enrollment physical and medical checks?  This does not seem plausible.     For now will proceed with cardiac MRI and await results of his genetic testing.  At the same time that I have explained that I cannot conclusively make this dx now, I would like for him to not perform competitive athletics and to replace jogging with brisk walking program.  We also discussed all of the info on patient education sheet.    He returns today, 03/14/2023 reports he is gained about 10 lb since giving up his aggressive jogging program.  He did try to swim but had teach himself how to swim when he was younger can not really get much accomplished in the pool.  He is asking again about jogging and we discussed again the risk of a competitive type jogging program and that I would prefer him perform brisk walking instead.  Since his last visit he is had absolutely no change in the symptoms.  He has no significant palpitations.  No  "presyncope or syncope.  No chest pain.  No shortness breath.  No orthopnea or PND.    Past Medical History:   Diagnosis Date    Allergy     Asthma     Family history of sudden cardiac death in brother 8/23/2017    Hypertension     Hypertrophic cardiomyopathy      No past surgical history on file.    Family History   Problem Relation Age of Onset    Diabetes Mother     Hypertension Mother     Early death Brother 23        Sleep apnea; obese; SCD--says "heart failure" was on death certificate    Cancer Paternal Grandfather 78        colon ca and prostate ca   He has 3 sons age 20, 18 and 5 years of age and 1 daughter age 15; all A&W though none have been screened yet for HCM    Social History     Socioeconomic History    Marital status:    Occupational History    See HPI   Tobacco Use    Smoking status: Never Smoker    Smokeless tobacco: Never Used   Substance and Sexual Activity    Alcohol use: Yes     Alcohol/week: 4.0 standard drinks     Types: 3 Shots of liquor, 1 Standard drinks or equivalent per week     Comment: occ    Drug use: No     Current Outpatient Medications on File Prior to Visit   Medication Sig Dispense Refill    cetirizine (ZYRTEC) 10 MG tablet Take 10 mg by mouth daily as needed for Allergies.      chlorthalidone (HYGROTEN) 25 MG Tab TAKE 1/2 TABLET BY MOUTH DAILY 45 tablet 0    valsartan (DIOVAN) 40 MG tablet Take 1 tablet (40 mg total) by mouth 2 (two) times daily. 180 tablet 3    sulfacetamide sodium-sulfur 10-5 % (w/w) Clsr Apply topically.       No current facility-administered medications on file prior to visit.         Review of patient's allergies indicates:   Allergen Reactions    No known drug allergies      Review of Systems   Constitutional: Positive for weight gain (he is gained 10 lb since his last visit 3 years ago). Negative for chills, decreased appetite, diaphoresis, fever, malaise/fatigue, night sweats and weight loss.   HENT:  Negative for ear discharge, ear pain, hearing " "loss and hoarse voice.    Eyes:  Negative for photophobia and visual disturbance.   Cardiovascular:  Negative for chest pain, dyspnea on exertion, irregular heartbeat, leg swelling, orthopnea, palpitations, paroxysmal nocturnal dyspnea and syncope.   Respiratory:  Negative for cough, hemoptysis, shortness of breath, sputum production and wheezing.    Endocrine: Negative for cold intolerance, heat intolerance, polydipsia and polyuria.   Hematologic/Lymphatic: Negative for bleeding problem. Does not bruise/bleed easily.   Musculoskeletal:  Positive for joint pain (knee). Negative for arthritis and back pain.   Gastrointestinal:  Negative for abdominal pain, anorexia, change in bowel habit, dysphagia, heartburn, hematochezia and melena.   Genitourinary:  Negative for dysuria, frequency and hematuria.   Neurological:  Positive for dizziness (on some occasions when he stands quickly) and light-headedness (on some occasions when he stands quickly). Negative for brief paralysis, focal weakness, headaches, seizures and weakness.   Allergic/Immunologic: Positive for environmental allergies.   Objective:   Physical Exam  Constitutional:       General: He is not in acute distress.     Appearance: He is well-developed. He is not diaphoretic.      Comments: /87 (BP Location: Left arm, Patient Position: Sitting, BP Method: Medium (Automatic))   Pulse 69   Ht 6' 2" (1.88 m)   Wt 99.2 kg (218 lb 11.1 oz)   BMI 28.08 kg/m²   Last visit weight 208 lb   HENT:      Head: Normocephalic and atraumatic.   Eyes:      General: No scleral icterus.        Right eye: No discharge.         Left eye: No discharge.      Conjunctiva/sclera: Conjunctivae normal.   Neck:      Thyroid: No thyromegaly.      Vascular: No JVD.   Cardiovascular:      Rate and Rhythm: Normal rate and regular rhythm.      Heart sounds: Normal heart sounds. No murmur (No murmur is present supine, sitting, standing or going from squatting to standing position) " heard.    No friction rub. No gallop.   Pulmonary:      Effort: Pulmonary effort is normal.      Breath sounds: Normal breath sounds.   Abdominal:      General: Bowel sounds are normal. There is no distension.      Palpations: Abdomen is soft. There is no mass.      Tenderness: There is no abdominal tenderness. There is no guarding or rebound.      Comments: No bruits   Musculoskeletal:         General: No tenderness.   Skin:     General: Skin is warm.   Neurological:      Mental Status: He is alert and oriented to person, place, and time.   Psychiatric:         Behavior: Behavior normal.         Thought Content: Thought content normal.         Judgment: Judgment normal.      Lab Results   Component Value Date    BNP 16 03/14/2023     03/14/2023    K 4.4 03/14/2023    MG 1.7 03/14/2023     03/14/2023    CO2 30 (H) 03/14/2023    BUN 15 03/14/2023    CREATININE 1.1 03/14/2023     03/14/2023    HGBA1C 5.9 (H) 05/04/2022    AST 17 03/14/2023    ALT 12 03/14/2023    ALBUMIN 3.9 03/14/2023    PROT 6.9 03/14/2023    BILITOT 0.3 03/14/2023    WBC 3.09 (L) 05/04/2022    HGB 13.8 (L) 05/04/2022    HCT 41.9 05/04/2022     05/04/2022    TSH 1.416 03/14/2023    K2UOZGQ 7.3 02/25/2021    FREET4 0.99 09/13/2018    CHOL 178 02/14/2023    HDL 53 02/14/2023    LDLCALC 116.4 02/14/2023    TRIG 43 02/14/2023     ECHO 8/26/2020 Conclusion  Moderate left atrial enlargement.  Thickened walls (with deep crypts) consistent with hypertrophic cardiomyopathy.  I feel LV walls are thicker than reported and approx 1.5-1.6 cm  No systolic anterior motion of the mitral valve, asymmetric septal hypertrophy or LV outflow gradient.  Normal left ventricular systolic function. The estimated ejection fraction is 60%.  The left ventricular global longitudinal strain is -15%.  Indeterminate left ventricular diastolic function.  Normal right ventricular systolic function.  Mild pulmonic regurgitation.  The estimated PA systolic  pressure is 29 mmHg.  Intermediate central venous pressure (8 mmHg).        EKG 1/11/2010, 6/23/2017, 7/10/2017, 3/15/2019, 9/3/2020, 10/28/2020  Sinus with marked LVH and repol changes (first EKG done at age 25 that we have access to but he was told of abnormal EKG age 19 which would be around 2004)    CARDIAC MRI 7/31/2017   1.  Left Ventricle:   The left ventricular volumes are normal. There are no regional wall motion abnormalities seen. The calculated LVEF is 71%.  There are crypts noted in the basal anterolateral and inferolateral wall. The maximum thickness of the ventricular walls is 12mm.   LVEDV: 176cc   LVEDV:83 cc/m2 for BSA (nl 68-112cc)   LVESV:  51cc   LVESV: 24cc/m2 for BSA (nl 16-44cc)   LVEF:  71%   LV mass: 83g/m2 (nl 47-87g/m2)   Stroke volume:  124cc by ventricular trace   Stroke volume: 93cc by Q-flow analysis   Regurgitant volume:2 cc by Q-flow analysis   Total volume through the aortic valve:95cc by Q-flow analysis   2.  Right Ventricle:     The right ventricular volumes are normal.  The calculated RVEF is 63%.   3.  Atria:   a.  Right:  The right atrium is normal in size. Area of  20cm2.   b.  Left:  The left atrium is normal in size. Volume measures 36 cc/m2 by area length method.   4.  Cardiac Valves:   a.  Mitral:  The mitral valve is structurally normal.     b.  Aortic:  The aortic valve is structurally normal     c.  Pulmonic:  The pulmonic valve is not visualized on this study.   d.  Tricuspid:  The tricuspid valve is structurally normal. TR noted   5.  Pulmonic vasculature on axial T1 EKG gated images:   a.  RPA: 18mm   b.  LPA: 22mm   c.  MPA: 30mm   6.  Aorta on axial T1 EKG gated images:   a. Ascending aorta: 32mm   b.  Descending aorta: 17mm   c.  Aortic arch:  Left sided   7.  Delayed hyperenhancement: There is no delayed hyperenhancement of the myocardium.     8. Pericardium:No effusion     CARDIAC MRI Conclusion:     1. Normal LV volume with LVEF of  71%. Upper limits of LV  "mass with maximum wall thickness of 12mm. There is cavity obliteration noted of the mid LV to apex with systole with myocardial crypts noted in the basal anterolateral and inferolateral walls. No   DHE noted. At this time, unable to fit the definition of a specific CM.   2. Normal RV volume with RVEF of  63%.     7/13/17 ECHO CONCLUSIONS     1 - Normal left ventricular systolic function (EF 65-70%).     2 - No wall motion abnormalities.     3 - Concentric hypertrophy. Walls 1.3-1.4 cm    4 - There is a borderline increased density to the myocardium, raising a  possibility of a myocardial infiltrative process (clinical correlation required).     5 - Normal left ventricular diastolic function.     6 - Right atrial enlargement.     7 - Right ventricle is upper limit of normal in size with hypertrophy, with normal systolic function.     8 - Trivial mitral regurgitation.     9 - Trivial to mild tricuspid regurgitation.     10 - Trivial to mild pulmonic regurgitation.     11 - The estimated PA systolic pressure is 30 mmHg.   Assessment:     1. Hypertrophic cardiomyopathy suspected    2. Essential hypertension      Plan:   To recap the discussion of his visit in 2020:  The dx of HCM requires LVH >1.5 cm in the absence of HBP or lesser degree of 1.2 cm if known family hx of HCM.  In his case this is difficult dx to make first because we have no definite family hx of HCM and second, since we know he had HBP at young age in  (while in [place]) but he was not started on BP treatment until age 19.  It was at that time in 2005 that he was told that he had an abnormal EKG but no other details known.  [facility] they note abnormal EKG ("non-specific abnormal") is only info on problem list.  He reports BP was controlled after treatment initiated 16 yrs ago but found some records where BP was elevated:  Row Name  02/24/12   0413  02/06/12   0248  01/13/12   0138  12/22/11   0352  07/26/11   0433   Enc " Vitals   BP  131/86  152/101Abnormal   154/100Abnormal   125/86  137/90     There are other visits in 2012 BP reasonably controlled as well as 2015, 2016, 2017.  ED visit 6/23/2017 /94 but 6/29/17 office visit /84.  Visit 7/10/17 -140/84.  On 8/11/17 visit with Heme /90 but 8/23/17 visit in Cards -134/79.  Cards visit 5/2/2018 -127/63-67.  On 9/13/18 /89, 3/15/19 /86, 9/26/19 /84, 10/25/19 /74, [facility] note /94 in 10/15/20 summary note but not sure of date BP taken, 4/9/20 ED visit /93, 8/26/20 /70, 9/3/20 /73.    Discussion at today's visit:  I would like to obtain a cardiac MRI, echocardiogram, stress study, ferritin (carrier of hemochromatosis gene), EKG, 48 hour Holter and genetics consult to be repeated to see if any abnormal genome present based upon new us panels available.  I will review the results of these tests with him in person at a clinic visit.       Going into today's visit, I cannot conclusively make diagnosis of hypertrophic cardiomyopathy nor can I excluded.  Therefore, I would still like for him to not perform competitive athletics or jogging.  Brisk walking program is acceptable.  Light weights acceptable as long as he is not straining and uses proper technique and he reassures me that he does use proper technique.

## 2023-03-14 NOTE — Clinical Note
I would like to obtain a cardiac MRI, echocardiogram, stress study, ferritin (carrier of hemochromatosis gene), EKG, 48 hour Holter and genetics consult to be repeated to see if any abnormal genome present based upon new us panels available.  I will review the results of these tests with him in person at a clinic visit.  Patient asked if you all could call him to make arrangements for these test and the follow-up clinic visit.  He can do all the testing at 1 time a week before his visit

## 2023-03-14 NOTE — PATIENT INSTRUCTIONS
DASH diet [facility]--any low salt diet is fine    Genetics 2020 phone message to patient  Spoke to Mr. Shelley about genetic testing results. He is a carrier for hemochromatosis. This does not explain his cardiomyopathy. We discussed HFE in more detail. His partner could be tested if desired. Will send copy of report. While the test was otherwise negative, we are not able to rule out an underlying genetic cause. He can return to genetics in 2-3 years for additional testing if desired.

## 2023-03-15 LAB — NT-PROBNP SERPL IA-MCNC: 22 PG/ML

## 2023-03-20 ENCOUNTER — TELEPHONE (OUTPATIENT)
Dept: GENETICS | Facility: CLINIC | Age: 38
End: 2023-03-20
Payer: OTHER GOVERNMENT

## 2023-03-20 NOTE — TELEPHONE ENCOUNTER
----- Message from Gariam Jordan CGC sent at 3/20/2023 11:09 AM CDT -----  Contact: Kurt [phone]   only with either myself or Gisele.  ----- Message -----  From: Manuel Cabrales MA  Sent: 3/15/2023  11:32 AM CDT  To: Garima Jordan CGC    Pt is being referred for Hypertrophic cardiomyopathy. Is this urgent?  ----- Message -----  From: Medina Mascorro  Sent: 3/15/2023  11:18 AM CDT  To: [facility] Genetics Clinical Support Staff    Caller is requesting an earlier appointment than what we can offer.  Caller declined first available appointment listed below.  Caller will not accept being placed on the waitlist and is requesting a message be sent to doctor.    Did you offer to schedule the next available appt and put the patient on the wait list:  N/a    When is the first available appointment: N/a    Preference of timeframe to be scheduled:  ASAP    Symptoms: Hypertrophic cardiomyopathy     Would the patient prefer a call back or a response via aitainmentner:  Call back    Additional Information:  Kurt is calling to get an appt with Genetics. Pt states there is a referral in the system for Genetics. Please call Kurt back for more advice.

## 2023-03-20 NOTE — TELEPHONE ENCOUNTER
Spoke with pt and scheduled him an appt with SUKHDEEP YAN on 3/29 at 11am. Pt verbalized understanding.

## 2023-03-28 ENCOUNTER — TELEPHONE (OUTPATIENT)
Dept: GENETICS | Facility: CLINIC | Age: 38
End: 2023-03-28
Payer: OTHER GOVERNMENT

## 2023-03-28 NOTE — TELEPHONE ENCOUNTER
Spoke with Mr. Shelley regarding his upcoming genetics appointment. He was last seen in genetics in 2020 regarding his personal history of HCM. His genetic testing was negative, and since his last appointment, no new or updated testing has become available. Let Mr. Shelley know that there is no new testing to offer at this time and gave him the option of keeping or canceling his upcoming appointment. Mr. Shelley would like for me to touch base with his cardiologist regarding his opinion.

## 2023-07-07 DIAGNOSIS — I42.2 HYPERTROPHIC CARDIOMYOPATHY: Primary | ICD-10-CM

## 2023-07-07 DIAGNOSIS — I42.8 OTHER CARDIOMYOPATHY: ICD-10-CM

## 2023-07-11 ENCOUNTER — HOSPITAL ENCOUNTER (OUTPATIENT)
Dept: CARDIOLOGY | Facility: CLINIC | Age: 38
Discharge: HOME | End: 2023-07-11
Attending: INTERNAL MEDICINE
Payer: OTHER GOVERNMENT

## 2023-07-11 ENCOUNTER — CLINICAL SUPPORT (OUTPATIENT)
Dept: CARDIOLOGY | Facility: HOSPITAL | Age: 38
End: 2023-07-11
Attending: INTERNAL MEDICINE
Payer: OTHER GOVERNMENT

## 2023-07-11 ENCOUNTER — HOSPITAL ENCOUNTER (OUTPATIENT)
Dept: CARDIOLOGY | Facility: HOSPITAL | Age: 38
Discharge: HOME | End: 2023-07-11
Attending: INTERNAL MEDICINE
Payer: OTHER GOVERNMENT

## 2023-07-11 ENCOUNTER — HOSPITAL ENCOUNTER (OUTPATIENT)
Dept: RADIOLOGY | Facility: HOSPITAL | Age: 38
Discharge: HOME | End: 2023-07-11
Attending: INTERNAL MEDICINE
Payer: OTHER GOVERNMENT

## 2023-07-11 VITALS
BODY MASS INDEX: 27.98 KG/M2 | SYSTOLIC BLOOD PRESSURE: 118 MMHG | HEIGHT: 74 IN | HEART RATE: 75 BPM | WEIGHT: 218 LBS | DIASTOLIC BLOOD PRESSURE: 82 MMHG

## 2023-07-11 VITALS — WEIGHT: 218 LBS | BODY MASS INDEX: 27.98 KG/M2 | HEIGHT: 74 IN

## 2023-07-11 DIAGNOSIS — I42.2 HYPERTROPHIC CARDIOMYOPATHY: Chronic | ICD-10-CM

## 2023-07-11 DIAGNOSIS — I42.2 HYPERTROPHIC CARDIOMYOPATHY: ICD-10-CM

## 2023-07-11 DIAGNOSIS — I42.8 OTHER CARDIOMYOPATHY: ICD-10-CM

## 2023-07-11 DIAGNOSIS — I10 ESSENTIAL HYPERTENSION: Chronic | ICD-10-CM

## 2023-07-11 LAB
ASCENDING AORTA: 2.98 CM
AV INDEX (PROSTH): 0.96
AV MEAN GRADIENT: 4 MMHG
AV PEAK GRADIENT: 8 MMHG
AV VALVE AREA: 4.69 CM2
AV VELOCITY RATIO: 0.94
BSA FOR ECHO PROCEDURE: 2.27 M2
CV ECHO LV RWT: 0.67 CM
CV STRESS BASE HR: 62 BPM
DIASTOLIC BLOOD PRESSURE: 92 MMHG
DOP CALC AO PEAK VEL: 1.4 M/S
DOP CALC AO VTI: 25.27 CM
DOP CALC LVOT AREA: 4.9 CM2
DOP CALC LVOT DIAMETER: 2.49 CM
DOP CALC LVOT PEAK VEL: 1.31 M/S
DOP CALC LVOT STROKE VOLUME: 118.61 CM3
DOP CALCLVOT PEAK VEL VTI: 24.37 CM
E WAVE DECELERATION TIME: 252.17 MSEC
E/A RATIO: 1.13
E/E' RATIO: 7.37 M/S
ECHO LV POSTERIOR WALL: 1.37 CM (ref 0.6–1.1)
EJECTION FRACTION: 65 %
FRACTIONAL SHORTENING: 40 % (ref 28–44)
INTERVENTRICULAR SEPTUM: 1.3 CM (ref 0.6–1.1)
IVRT: 71.36 MSEC
LA MAJOR: 5.77 CM
LA MINOR: 5.77 CM
LA WIDTH: 3.71 CM
LEFT ATRIUM SIZE: 3.97 CM
LEFT ATRIUM VOLUME INDEX MOD: 20.5 ML/M2
LEFT ATRIUM VOLUME INDEX: 32.1 ML/M2
LEFT ATRIUM VOLUME MOD: 46.18 CM3
LEFT ATRIUM VOLUME: 72.24 CM3
LEFT INTERNAL DIMENSION IN SYSTOLE: 2.48 CM (ref 2.1–4)
LEFT VENTRICLE DIASTOLIC VOLUME INDEX: 32.98 ML/M2
LEFT VENTRICLE DIASTOLIC VOLUME: 74.2 ML
LEFT VENTRICLE MASS INDEX: 90 G/M2
LEFT VENTRICLE SYSTOLIC VOLUME INDEX: 9.7 ML/M2
LEFT VENTRICLE SYSTOLIC VOLUME: 21.9 ML
LEFT VENTRICULAR INTERNAL DIMENSION IN DIASTOLE: 4.1 CM (ref 3.5–6)
LEFT VENTRICULAR MASS: 201.42 G
LV LATERAL E/E' RATIO: 7 M/S
LV SEPTAL E/E' RATIO: 7.78 M/S
MV A" WAVE DURATION": 9.13 MSEC
MV PEAK A VEL: 0.62 M/S
MV PEAK E VEL: 0.7 M/S
MV STENOSIS PRESSURE HALF TIME: 73.13 MS
MV VALVE AREA P 1/2 METHOD: 3.01 CM2
OHS CV CPX 1 MINUTE RECOVERY HEART RATE: 155 BPM
OHS CV CPX 85 PERCENT MAX PREDICTED HEART RATE MALE: 154
OHS CV CPX ESTIMATED METS: 27
OHS CV CPX MAX PREDICTED HEART RATE: 181
OHS CV CPX PATIENT IS FEMALE: 0
OHS CV CPX PATIENT IS MALE: 1
OHS CV CPX PEAK DIASTOLIC BLOOD PRESSURE: 85 MMHG
OHS CV CPX PEAK HEAR RATE: 181 BPM
OHS CV CPX PEAK RATE PRESSURE PRODUCT: NORMAL
OHS CV CPX PEAK SYSTOLIC BLOOD PRESSURE: 207 MMHG
OHS CV CPX PERCENT MAX PREDICTED HEART RATE ACHIEVED: 100
OHS CV CPX RATE PRESSURE PRODUCT PRESENTING: 8308
PISA TR MAX VEL: 2.11 M/S
PULM VEIN S/D RATIO: 0.96
PV PEAK D VEL: 0.45 M/S
PV PEAK S VEL: 0.43 M/S
RA MAJOR: 4.85 CM
RA PRESSURE: 3 MMHG
RA WIDTH: 3.95 CM
RIGHT VENTRICULAR END-DIASTOLIC DIMENSION: 3.59 CM
SINUS: 3.09 CM
STJ: 2.84 CM
STRESS ECHO POST EXERCISE DUR MIN: 13 MINUTES
STRESS ECHO POST EXERCISE DUR SEC: 6 SECONDS
STRESS ST DEPRESSION: 1.5 MM
SYSTOLIC BLOOD PRESSURE: 134 MMHG
TDI LATERAL: 0.1 M/S
TDI SEPTAL: 0.09 M/S
TDI: 0.1 M/S
TR MAX PG: 18 MMHG
TRICUSPID ANNULAR PLANE SYSTOLIC EXCURSION: 1.69 CM
TV REST PULMONARY ARTERY PRESSURE: 21 MMHG

## 2023-07-11 PROCEDURE — 93005 ELECTROCARDIOGRAM TRACING: CPT | Mod: PBBFAC,59 | Performed by: INTERNAL MEDICINE

## 2023-07-11 PROCEDURE — 75561 CARDIAC MRI FOR MORPH W/DYE: CPT | Mod: TC

## 2023-07-11 PROCEDURE — 93306 TTE W/DOPPLER COMPLETE: CPT

## 2023-07-11 PROCEDURE — 93306 ECHO (CUPID ONLY): ICD-10-PCS | Mod: 26,,, | Performed by: INTERNAL MEDICINE

## 2023-07-11 PROCEDURE — 93017 CV STRESS TEST TRACING ONLY: CPT

## 2023-07-11 PROCEDURE — 93010 ELECTROCARDIOGRAM REPORT: CPT | Mod: S$PBB,,, | Performed by: INTERNAL MEDICINE

## 2023-07-11 PROCEDURE — 93016 CV STRESS TEST SUPVJ ONLY: CPT | Mod: ,,, | Performed by: INTERNAL MEDICINE

## 2023-07-11 PROCEDURE — 93010 EKG 12-LEAD: ICD-10-PCS | Mod: S$PBB,,, | Performed by: INTERNAL MEDICINE

## 2023-07-11 PROCEDURE — 93018 CV STRESS TEST I&R ONLY: CPT | Mod: ,,, | Performed by: INTERNAL MEDICINE

## 2023-07-11 PROCEDURE — 93306 TTE W/DOPPLER COMPLETE: CPT | Mod: 26,,, | Performed by: INTERNAL MEDICINE

## 2023-07-11 PROCEDURE — 93018 EXERCISE STRESS - EKG (CUPID ONLY): ICD-10-PCS | Mod: ,,, | Performed by: INTERNAL MEDICINE

## 2023-07-11 PROCEDURE — 93016 EXERCISE STRESS - EKG (CUPID ONLY): ICD-10-PCS | Mod: ,,, | Performed by: INTERNAL MEDICINE

## 2023-07-12 ENCOUNTER — CLINICAL SUPPORT (OUTPATIENT)
Dept: CARDIOLOGY | Facility: HOSPITAL | Age: 38
End: 2023-07-12
Attending: INTERNAL MEDICINE
Payer: OTHER GOVERNMENT

## 2023-07-12 PROCEDURE — 93227 XTRNL ECG REC<48 HR R&I: CPT | Mod: ,,, | Performed by: INTERNAL MEDICINE

## 2023-07-12 PROCEDURE — 93227 HOLTER MONITOR - 48 HOUR (CUPID ONLY): ICD-10-PCS | Mod: ,,, | Performed by: INTERNAL MEDICINE

## 2023-07-12 PROCEDURE — 93225 XTRNL ECG REC<48 HRS REC: CPT

## 2023-07-17 LAB
OHS CV EVENT MONITOR DAY: 0
OHS CV HOLTER LENGTH DECIMAL HOURS: 48
OHS CV HOLTER LENGTH HOURS: 48
OHS CV HOLTER LENGTH MINUTES: 0
OHS CV HOLTER SINUS AVERAGE HR: 74
OHS CV HOLTER SINUS MAX HR: 148
OHS CV HOLTER SINUS MIN HR: 44

## 2023-07-17 NOTE — PROGRESS NOTES
"ALLERGY & IMMUNOLOGY CLINIC -  NEW PATIENT     HISTORY OF PRESENT ILLNESS     Patient ID: Kurt Shelley is a 37 y.o. male    CC:   Chief Complaint   Patient presents with    Allergies     Seasonal allergy concerns        HPI: Kurt Shelley is a 37 y.o. male presents for evaluation of:    07/18/2023  [facility] [place] Experienced symptoms for the previous 20 years. Endorses nasal congestion, difficulty breathing through his nose, postnasal drip. Symptoms progressively worsening in that time. Denies seasonal worsening and known triggers including scented products, cleaning products, tobacco smoke.   Currently taking OTC antihistamine with some relief of symptoms. Also using saline rinses and fluticasone 1-2 sprays each nostril daily with no relief of symptoms.     Diagnosed with asthma as a child. States he remained asymptomatic until recently when exposed to dogs, developed chest tightness, wheezing and shortness of breath.     H/o Eczema: denies  Oral Allergy:  denies  Food Allergy: denies  Venom Allergy: denies  Latex Allergy: denies  Env/Occ: denies any environmental or occupational exposures     REVIEW OF SYSTEMS     CONST: no F/C/NS, no unintentional weight changes  Balance of review of systems negative except as mentioned above     MEDICAL HISTORY     MedHx: active problems reviewed  SurgHx: History reviewed. No pertinent surgical history.    SocHx:   -Denies smoking history and illicit drug use   -Pets: Denies  -Mold/Water Damage: Denies  -School/Work:     FamHx:   -Rhinitis: Son    Otherwise no Family History of asthma, allergic rhinitis, atopic dermatitis, drug allergy, food allergy, venom allergy or immune deficiency.     Allergies: see below  Medications: MAR reviewed       PHYSICAL EXAM     VS: Ht 6' 2" (1.88 m)   Wt 91.4 kg (201 lb 8 oz)   BMI 25.87 kg/m²   GENERAL: awake, alert, cooperative with exam  EYES: PERRL, EOMI, no conjunctival " injection, no discharge, no infraorbital shiners  EARS: external auditory canals normal B/L, TM normal B/L  NOSE: NT 3+ and pale B/L, +stringing mucous, no polyps  ORAL: MMM, no ulcers, no thrush, no cobblestoning  LUNGS: CTAB, no w/r/c, no increased WOB  HEART: Normal Rate and regular rhythm, normal S1/S2, no m/g/r  EXTREMITIES: +2 distal pulses, no c/c/e  DERM: no rashes, no skin breaks     ASSESSMENT/PLAN     Kurt Shelley is a 37 y.o. male with     1. Chronic rhinitis  Allergic vs vasomotor in etiology, given strong family history and concurrent history of childhood asthma, strongly suspected to be allergic in etiology. Recommend to return for aeroallergen skin prick testing and consideration of allergen immunotherapy at that time as well. Discontinue all antihistamines and return in 1 week. OK to continue fluticasone nasal spray    2. Wheezing  History of childhood asthma and respiratory symptoms responsive to albuterol suggestive of asthma phenotype. Recommend spirometry prior to starting allergen immunotherapy   - Spirometry with/without bronchodilator; Future          Follow up: 1 week for skin prick testing scheduled      Scott Van MD    I spent a total of 30 minutes on the day of the visit. This includes face to face time and non-face to face time preparing to see the patient (eg, review of tests), obtaining and/or reviewing separately obtained history, documenting clinical information in the electronic or other health record, independently interpreting results and communicating results to the patient/family/caregiver, or care coordinator.

## 2023-07-18 ENCOUNTER — OFFICE VISIT (OUTPATIENT)
Dept: ALLERGY | Facility: CLINIC | Age: 38
End: 2023-07-18
Payer: OTHER GOVERNMENT

## 2023-07-18 VITALS — HEIGHT: 74 IN | WEIGHT: 201.5 LBS | BODY MASS INDEX: 25.86 KG/M2

## 2023-07-18 DIAGNOSIS — R06.2 WHEEZING: ICD-10-CM

## 2023-07-18 DIAGNOSIS — J31.0 CHRONIC RHINITIS: Primary | ICD-10-CM

## 2023-07-18 PROCEDURE — 99204 OFFICE O/P NEW MOD 45 MIN: CPT | Mod: S$PBB,,, | Performed by: STUDENT IN AN ORGANIZED HEALTH CARE EDUCATION/TRAINING PROGRAM

## 2023-07-18 PROCEDURE — 99204 PR OFFICE/OUTPT VISIT, NEW, LEVL IV, 45-59 MIN: ICD-10-PCS | Mod: S$PBB,,, | Performed by: STUDENT IN AN ORGANIZED HEALTH CARE EDUCATION/TRAINING PROGRAM

## 2023-07-18 PROCEDURE — 99999 PR PBB SHADOW E&M-EST. PATIENT-LVL III: CPT | Mod: PBBFAC,,, | Performed by: STUDENT IN AN ORGANIZED HEALTH CARE EDUCATION/TRAINING PROGRAM

## 2023-07-18 PROCEDURE — 99999 PR PBB SHADOW E&M-EST. PATIENT-LVL III: ICD-10-PCS | Mod: PBBFAC,,, | Performed by: STUDENT IN AN ORGANIZED HEALTH CARE EDUCATION/TRAINING PROGRAM

## 2023-07-18 PROCEDURE — 99213 OFFICE O/P EST LOW 20 MIN: CPT | Mod: PBBFAC,PO | Performed by: STUDENT IN AN ORGANIZED HEALTH CARE EDUCATION/TRAINING PROGRAM

## 2023-07-18 RX ORDER — ALBUTEROL SULFATE 90 UG/1
2 AEROSOL, METERED RESPIRATORY (INHALATION) EVERY 6 HOURS PRN
Qty: 18 G | Refills: 0 | Status: SHIPPED | OUTPATIENT
Start: 2023-07-18 | End: 2024-07-17

## 2023-07-24 ENCOUNTER — PATIENT MESSAGE (OUTPATIENT)
Dept: TRANSPLANT | Facility: CLINIC | Age: 38
End: 2023-07-24

## 2023-07-25 ENCOUNTER — OFFICE VISIT (OUTPATIENT)
Dept: ALLERGY | Facility: CLINIC | Age: 38
End: 2023-07-25
Payer: OTHER GOVERNMENT

## 2023-07-25 VITALS — WEIGHT: 201 LBS | HEIGHT: 74 IN | BODY MASS INDEX: 25.8 KG/M2

## 2023-07-25 DIAGNOSIS — J30.9 CHRONIC ALLERGIC RHINITIS: Primary | ICD-10-CM

## 2023-07-25 DIAGNOSIS — R06.2 WHEEZING: ICD-10-CM

## 2023-07-25 PROCEDURE — 99214 OFFICE O/P EST MOD 30 MIN: CPT | Mod: 25,S$PBB,, | Performed by: STUDENT IN AN ORGANIZED HEALTH CARE EDUCATION/TRAINING PROGRAM

## 2023-07-25 PROCEDURE — 99214 PR OFFICE/OUTPT VISIT, EST, LEVL IV, 30-39 MIN: ICD-10-PCS | Mod: 25,S$PBB,, | Performed by: STUDENT IN AN ORGANIZED HEALTH CARE EDUCATION/TRAINING PROGRAM

## 2023-07-25 PROCEDURE — 99999 PR PBB SHADOW E&M-EST. PATIENT-LVL II: ICD-10-PCS | Mod: PBBFAC,,, | Performed by: STUDENT IN AN ORGANIZED HEALTH CARE EDUCATION/TRAINING PROGRAM

## 2023-07-25 PROCEDURE — 95004 PERQ TESTS W/ALRGNC XTRCS: CPT | Mod: S$PBB,,, | Performed by: STUDENT IN AN ORGANIZED HEALTH CARE EDUCATION/TRAINING PROGRAM

## 2023-07-25 PROCEDURE — 95004 PERQ TESTS W/ALRGNC XTRCS: CPT | Mod: PBBFAC,PO | Performed by: STUDENT IN AN ORGANIZED HEALTH CARE EDUCATION/TRAINING PROGRAM

## 2023-07-25 PROCEDURE — 99999 PR PBB SHADOW E&M-EST. PATIENT-LVL II: CPT | Mod: PBBFAC,,, | Performed by: STUDENT IN AN ORGANIZED HEALTH CARE EDUCATION/TRAINING PROGRAM

## 2023-07-25 PROCEDURE — 99212 OFFICE O/P EST SF 10 MIN: CPT | Mod: 25,PBBFAC,PO | Performed by: STUDENT IN AN ORGANIZED HEALTH CARE EDUCATION/TRAINING PROGRAM

## 2023-07-25 PROCEDURE — 95004 PR ALLERGY SKIN TESTS,ALLERGENS: ICD-10-PCS | Mod: S$PBB,,, | Performed by: STUDENT IN AN ORGANIZED HEALTH CARE EDUCATION/TRAINING PROGRAM

## 2023-07-25 NOTE — PROGRESS NOTES
ALLERGY & IMMUNOLOGY CLINIC - Skin Testing     HISTORY OF PRESENT ILLNESS     Patient ID: Kurt Shelley is a 37 y.o. male    CC: skin testing    HPI: Kurt Shelley is a 37 y.o. male here for aeroallergen skin testing. Has been off antihistamines the previous week and denies acute illness today.       REVIEW OF SYSTEMS     Balance of review of systems negative except as mentioned above     MEDICAL HISTORY     MedHx: active problems reviewed  SurgHx: History reviewed. No pertinent surgical history.  Allergies: see below  Medications: MAR reviewed     PHYSICAL EXAM     General: Awake, Alert, Oriented  Heart: RRR, No Murmurs  Lungs: CTAB, No wheezes  Skin: No rashes or skin breaks     ALLERGEN TESTING     After explaining risks and benefits, elected to proceed with aeroallergen skin prick testing.   Skin Prick:   +Cat, Dust mites x2, [place], Cedar, Ragweed, Mugwort, All grasses, Pigweed     ASSESSMENT & PLAN     Kurt Shelley is a 37 y.o. male with     Chronic allergic rhinitis  Wheezing    After explaining risks and benefits, proceeded to discuss informed consent for allergen immunotherapy with patient. Patient would like to proceed with allergen immunotherapy via rush or modified rush protocol, will message me this week. Recommended to notify MD if starting any new medications including beta blockers while on allergen immunotherapy. Will prescribe extracts when he messages through portal.     Spent an additional 30 minutes detailing allergen mitigation, alternative medication management, answering questions regarding immunotherapy and the pathophysiology associated with allergies. All questions answered      Follow up: LIVIER Van MD  Allergy&Immunology

## 2023-08-25 ENCOUNTER — OFFICE VISIT (OUTPATIENT)
Dept: INTERNAL MEDICINE | Facility: CLINIC | Age: 38
End: 2023-08-25
Payer: OTHER GOVERNMENT

## 2023-08-25 VITALS
OXYGEN SATURATION: 96 % | HEART RATE: 71 BPM | HEIGHT: 74 IN | SYSTOLIC BLOOD PRESSURE: 130 MMHG | DIASTOLIC BLOOD PRESSURE: 86 MMHG | WEIGHT: 209.44 LBS | BODY MASS INDEX: 26.88 KG/M2

## 2023-08-25 DIAGNOSIS — K13.70 MOUTH LESION: Primary | ICD-10-CM

## 2023-08-25 PROCEDURE — 99999 PR PBB SHADOW E&M-EST. PATIENT-LVL IV: ICD-10-PCS | Mod: PBBFAC,,, | Performed by: INTERNAL MEDICINE

## 2023-08-25 PROCEDURE — 99212 OFFICE O/P EST SF 10 MIN: CPT | Mod: S$PBB,,, | Performed by: INTERNAL MEDICINE

## 2023-08-25 PROCEDURE — 99212 PR OFFICE/OUTPT VISIT, EST, LEVL II, 10-19 MIN: ICD-10-PCS | Mod: S$PBB,,, | Performed by: INTERNAL MEDICINE

## 2023-08-25 PROCEDURE — 99214 OFFICE O/P EST MOD 30 MIN: CPT | Mod: PBBFAC | Performed by: INTERNAL MEDICINE

## 2023-08-25 PROCEDURE — 99999 PR PBB SHADOW E&M-EST. PATIENT-LVL IV: CPT | Mod: PBBFAC,,, | Performed by: INTERNAL MEDICINE

## 2023-08-25 NOTE — PROGRESS NOTES
CC:  Mouth lesion/concern for parasites    HPI:  The patient is a 38-year-old male with asthma and hypertension who presents today as a concern for a mouth lesion.  The patient had traveled to the [facility] and gone to the [place] of [place].  He ate mostly cook food but did have a tuna dish that was raw.  He noticed a bump on the inner side of his lower lip.  He was concerned this might represent a parasite.    ROS: Patient reports otherwise feeling well.  No fever chills.  No one else in his group had any symptoms similar to his.    Physical exam:  Exam was limited to head and neck.  The patient's mouth was evaluated.  The patient had a linear lesion.  He had no cervical adenopathy.  Nothing else was seen on oral exam.    Lesion looks similar to a aphthous ulcer.  The patient reports no pain or discomfort.    Assessment:  1. Oral lesion    Plan:  The patient was given a referral to ENT  2.  The patient will observe for now.  The symptoms do not improve resolve, he is to make an appointment to see ENT.

## 2023-09-08 DIAGNOSIS — I10 ESSENTIAL HYPERTENSION: Chronic | ICD-10-CM

## 2023-09-12 RX ORDER — CHLORTHALIDONE 25 MG/1
TABLET ORAL
Qty: 45 TABLET | Refills: 3 | Status: SHIPPED | OUTPATIENT
Start: 2023-09-12 | End: 2023-11-06 | Stop reason: SDUPTHER

## 2023-11-06 ENCOUNTER — OFFICE VISIT (OUTPATIENT)
Dept: TRANSPLANT | Facility: CLINIC | Age: 38
End: 2023-11-06
Attending: INTERNAL MEDICINE
Payer: OTHER GOVERNMENT

## 2023-11-06 VITALS
SYSTOLIC BLOOD PRESSURE: 142 MMHG | HEIGHT: 74 IN | BODY MASS INDEX: 27.02 KG/M2 | DIASTOLIC BLOOD PRESSURE: 81 MMHG | WEIGHT: 210.56 LBS | HEART RATE: 79 BPM

## 2023-11-06 DIAGNOSIS — I42.2 HYPERTROPHIC CARDIOMYOPATHY: Primary | Chronic | ICD-10-CM

## 2023-11-06 DIAGNOSIS — Z01.812 PRE-PROCEDURE LAB EXAM: ICD-10-CM

## 2023-11-06 DIAGNOSIS — I10 ESSENTIAL HYPERTENSION: Chronic | ICD-10-CM

## 2023-11-06 PROCEDURE — 99999 PR PBB SHADOW E&M-EST. PATIENT-LVL IV: ICD-10-PCS | Mod: PBBFAC,,, | Performed by: INTERNAL MEDICINE

## 2023-11-06 PROCEDURE — 99214 OFFICE O/P EST MOD 30 MIN: CPT | Mod: PBBFAC | Performed by: INTERNAL MEDICINE

## 2023-11-06 PROCEDURE — 99214 PR OFFICE/OUTPT VISIT, EST, LEVL IV, 30-39 MIN: ICD-10-PCS | Mod: S$PBB,,, | Performed by: INTERNAL MEDICINE

## 2023-11-06 PROCEDURE — 99999 PR PBB SHADOW E&M-EST. PATIENT-LVL IV: CPT | Mod: PBBFAC,,, | Performed by: INTERNAL MEDICINE

## 2023-11-06 PROCEDURE — 99214 OFFICE O/P EST MOD 30 MIN: CPT | Mod: S$PBB,,, | Performed by: INTERNAL MEDICINE

## 2023-11-06 RX ORDER — VALSARTAN 40 MG/1
40 TABLET ORAL 2 TIMES DAILY
Qty: 180 TABLET | Refills: 3 | Status: SHIPPED | OUTPATIENT
Start: 2023-11-06 | End: 2024-10-31

## 2023-11-06 RX ORDER — LORAZEPAM 1 MG/1
TABLET ORAL
Qty: 4 TABLET | Refills: 0 | Status: SHIPPED | OUTPATIENT
Start: 2023-11-06

## 2023-11-06 RX ORDER — CHLORTHALIDONE 25 MG/1
12.5 TABLET ORAL DAILY
Qty: 45 TABLET | Refills: 3 | Status: SHIPPED | OUTPATIENT
Start: 2023-11-06

## 2023-11-06 NOTE — PROGRESS NOTES
"Subjective:     Patient ID:  Kurt Shelley is a 38 y.o. male who presents for follow-up of suspected hypertrophic cardiomyopathy    HPI: 39 yo BM here for f/u originally referred by Dr. Flores--please see visit note of 3/14/23.    After this visit genetics told him no change in panel so no point in repeating test at that time.    Testing July 2023:  EKG First degree AV block and persistent LVH with repol abn  Stress study for hypertrophic CM patient without any high risk features  This ECHO without obstruction and without conclusive evidence for hypertrophic CM  Normal iron rules out hemochromatosis  Holter is normal  Cardiac MRI had not been performed since 2017 study    He comes today for f/u visit.  He continues to do well with no active symptoms.  He had cardiac MRI 11/16/2023 that demonstrated asymmetric septal hypertrophy.  There was no apical hypertrophy cardiomyopathy.     Objective:   Physical Exam  Constitutional:       General: He is not in acute distress.     Appearance: He is well-developed. He is not diaphoretic.      Comments: BP (!) 142/81 (BP Location: Left arm, Patient Position: Sitting, BP Method: Medium (Automatic))   Pulse 79   Ht 6' 2" (1.88 m)   Wt 95.5 kg (210 lb 8.6 oz)   BMI 27.03 kg/m²   Last visit weight 219 lb and visit before last visit weight 208 lb   HENT:      Head: Normocephalic and atraumatic.   Eyes:      General: No scleral icterus.        Right eye: No discharge.         Left eye: No discharge.      Conjunctiva/sclera: Conjunctivae normal.   Neck:      Thyroid: No thyromegaly.      Vascular: No JVD.   Cardiovascular:      Rate and Rhythm: Normal rate and regular rhythm.      Heart sounds: Normal heart sounds. No murmur (No murmur is present supine, sitting, standing or going from squatting to standing position) heard.     No friction rub. No gallop.   Pulmonary:      Effort: Pulmonary effort is normal.      Breath sounds: Normal breath sounds.   Abdominal:      General: " Bowel sounds are normal. There is no distension.      Palpations: Abdomen is soft. There is no mass.      Tenderness: There is no abdominal tenderness. There is no guarding or rebound.      Comments: No bruits   Musculoskeletal:         General: No swelling or tenderness.   Skin:     General: Skin is warm.   Neurological:      General: No focal deficit present.      Mental Status: He is alert and oriented to person, place, and time. Mental status is at baseline.   Psychiatric:         Behavior: Behavior normal.         Thought Content: Thought content normal.         Judgment: Judgment normal.          11/16/2023 cardiac MRI---he has a definite diagnosis of hypertrophic cardiomyopathy based upon this MRI though I can not explain the normal LV mass with this diagnosis     Normal LV volume with LVEF of 59%. Normal LV mass with asymmetric hypertrophy of the basal anteroseptum measuring up to 15mm in the 3-chamber view. Additional measurements described above. There is no increase in LV hypertrophy apically.   There is no systolic anterior motion of the mitral valve or notable LVOT obstruction.   Normal RV volume with RVEF of 55%.  Normal T1 and T2 mapping.   Negative delayed hyperenhancement.          7/11/2023 ECHO--images reviewed at today's visit.  I am concerned over apical HCM  The left ventricle is normal in size with concentric remodeling and normal systolic function.The walls are thickened but LVMI does not come close to meeting LVH criteria but there is more apical hypertrophy.  The estimated ejection fraction is 65%.  Normal left ventricular diastolic function.  Normal right ventricular size with normal right ventricular systolic function.  No KHUSHBOO or LVOT gradient  Mild tricuspid regurgitation.  Mild pulmonic regurgitation.  Normal central venous pressure (3 mmHg).  The estimated PA systolic pressure is 21 mmHg.    7/11/23  EKG images reviewed at today's visit   sinus rhythm, LVH with repol  changes    7/11/2023 ETT reviewed at today's visit    The ECG portion of the study is positive for ischemia with markedly reduced specificity at  and 27 est METs.    The patient reported no chest pain during the stress test.    The blood pressure response to stress was normal.    The exercise capacity was very excellent.     7/11/2023 48 hr holter reviewed at today's visit    Diary    The diary was properly completed.   Overall, the study was of good quality. The tape was adequate (0 days , 48 hours, 0 minutes).        There was an episode of chest pain reported. The corresponding rhythm strips revealed the following: the rhythm was sinus tachycardia at 72 bpm.     Rhythm    Predominant Rhythm  Sinus rhythm with heart rates varying between 44 and 148 BPM with an average of 74BPM.     Maximum heart rate recorded at: 06:16 CDT on day 1.     Minimum heart rate recorded at 07:34 CDT on day 2.     PVC    Ventricular Arrhythmias  There were very rare PVCs totalling 2 and averaging 0.04 per hour.     PAC    Supraventricular Arrhythmias  There were very rare PACs totalling 11 and averaging 0.23 per hour.       Lab Results   Component Value Date     03/14/2023    K 4.4 03/14/2023     03/14/2023    CO2 30 (H) 03/14/2023    BUN 15 03/14/2023    CREATININE 1.1 03/14/2023    CALCIUM 9.3 03/14/2023    ANIONGAP 5 (L) 03/14/2023    ESTGFRAFRICA >60.0 05/04/2022    EGFRNONAA >60.0 05/04/2022     Assessment:     1. Hypertrophic cardiomyopathy see cardiac MRI November 2023    2. Essential hypertension    3. Pre-procedure lab exam    Please note that epic will not allow me to remove preprocedure lab exam though that is not a diagnosis for today's visit or or any labs ordered for today's visit  Plan:   No competitive exercise/sports as we are now able to make a diagnosis of hypertrophic cardiomyopathy.  There is no evidence of obstruction at this time.  He did not have a genetic mutation but should be re screened  within 5 years.  He has history of hypertension and will target BP < 130/80 and not use arterial vasodilator    Son who plays  for [place] [employer] has not been checked though he told his son of my advice for first degree relatives to be screened; his other children have been screened and so far all OK and we discussed this again today's visit    I would like to see him yearly starting July 2024 with EKG, echocardiogram and Doppler with Valsalva though with his next visit I am going to perform a stress echo to look for obstruction and follow up on his hypertrophic cardiomyopathy.  He should undergo periodic stress testing to evaluate blood pressure response to exercise and should undergo 48 hour Holter monitor yearly.

## 2023-11-16 ENCOUNTER — HOSPITAL ENCOUNTER (OUTPATIENT)
Dept: RADIOLOGY | Facility: HOSPITAL | Age: 38
Discharge: HOME | End: 2023-11-16
Attending: INTERNAL MEDICINE
Payer: OTHER GOVERNMENT

## 2023-11-16 DIAGNOSIS — I42.2 HYPERTROPHIC CARDIOMYOPATHY: Chronic | ICD-10-CM

## 2023-11-16 PROCEDURE — 75561 CARDIAC MRI FOR MORPH W/DYE: CPT | Mod: 26,,, | Performed by: RADIOLOGY

## 2023-11-16 PROCEDURE — 25500020 PHARM REV CODE 255: Performed by: INTERNAL MEDICINE

## 2023-11-16 PROCEDURE — 75561 CARDIAC MRI FOR MORPH W/DYE: CPT | Mod: TC

## 2023-11-16 PROCEDURE — 75561 MRI CARDIAC MORPHOLOGY FUNCTION W WO: ICD-10-PCS | Mod: 26,,, | Performed by: RADIOLOGY

## 2023-11-16 PROCEDURE — 75561 CV CARDIAC MRI MORPHOLOGY (CUPID ONLY): ICD-10-PCS | Mod: 26,,, | Performed by: PEDIATRICS

## 2023-11-16 PROCEDURE — A9577 INJ MULTIHANCE: HCPCS | Performed by: INTERNAL MEDICINE

## 2023-11-16 PROCEDURE — 75561 CARDIAC MRI FOR MORPH W/DYE: CPT | Mod: 26,,, | Performed by: PEDIATRICS

## 2023-11-16 RX ADMIN — GADOBENATE DIMEGLUMINE 20 ML: 529 INJECTION, SOLUTION INTRAVENOUS at 12:11

## 2024-02-22 ENCOUNTER — OFFICE VISIT (OUTPATIENT)
Dept: SURGERY | Facility: CLINIC | Age: 39
End: 2024-02-22
Payer: COMMERCIAL

## 2024-02-22 VITALS
HEART RATE: 89 BPM | SYSTOLIC BLOOD PRESSURE: 125 MMHG | BODY MASS INDEX: 26.4 KG/M2 | HEIGHT: 74 IN | WEIGHT: 205.69 LBS | DIASTOLIC BLOOD PRESSURE: 87 MMHG

## 2024-02-22 DIAGNOSIS — K64.8 HEMORRHOID PROLAPSE: Primary | ICD-10-CM

## 2024-02-22 PROCEDURE — 46600 DIAGNOSTIC ANOSCOPY SPX: CPT | Mod: S$GLB,ICN,, | Performed by: NURSE PRACTITIONER

## 2024-02-22 PROCEDURE — 99214 OFFICE O/P EST MOD 30 MIN: CPT | Mod: 25,S$GLB,ICN, | Performed by: NURSE PRACTITIONER

## 2024-02-22 PROCEDURE — 99214 OFFICE O/P EST MOD 30 MIN: CPT | Mod: PBBFAC | Performed by: NURSE PRACTITIONER

## 2024-02-22 PROCEDURE — 46600 DIAGNOSTIC ANOSCOPY SPX: CPT | Mod: PBBFAC | Performed by: NURSE PRACTITIONER

## 2024-02-22 PROCEDURE — 99999 PR PBB SHADOW E&M-EST. PATIENT-LVL IV: CPT | Mod: PBBFAC,,, | Performed by: NURSE PRACTITIONER

## 2024-02-22 RX ORDER — HYDROCORTISONE ACETATE 25 MG/1
25 SUPPOSITORY RECTAL 2 TIMES DAILY
Qty: 24 SUPPOSITORY | Refills: 1 | Status: SHIPPED | OUTPATIENT
Start: 2024-02-22 | End: 2024-03-17

## 2024-02-22 RX ORDER — HYDROCORTISONE 25 MG/G
CREAM TOPICAL 2 TIMES DAILY
Qty: 28 G | Refills: 2 | Status: SHIPPED | OUTPATIENT
Start: 2024-02-22 | End: 2024-06-03 | Stop reason: SDUPTHER

## 2024-02-22 NOTE — PROGRESS NOTES
"CRS Office Visit History and Physical    Referring Md:   Self, Aaareferral  No address on file    SUBJECTIVE:     Chief Complaint: hemorrhoids    History of Present Illness:  The patient is new patient to this practice.   Course is as follows:  Patient is a 38 y.o. male presents with progressively worsening hemorrhoidal prolapse with bleeding.  Symptoms have been present for years.  Has tried prep h which is no longer working; does recall using a rx in the past with good results.  Associated bleeding: yes  Previous anorectal procedures: No  confirms straining/prolonged time on toilet with bowel movements.  is not currently taking fiber supplement or stool softener  1-2 bm/day soft formed stools  Blood thinners: No    Last Colonoscopy: none  Family history of colorectal cancer or IBD: none; paternal GF with cancer though unsure as to what kind/where.    Review of patient's allergies indicates:   Allergen Reactions    No known drug allergies        Past Medical History:   Diagnosis Date    Allergy     Asthma     Family history of sudden cardiac death in brother 08/23/2017    Hypertension     Hypertrophic cardiomyopathy     Hypertrophic cardiomyopathy--see cardiac MRI November 2023 07/25/2017     No past surgical history on file.  Family History   Problem Relation Age of Onset    Diabetes Mother     Hypertension Mother     Early death Brother 23        Sleep apnea; obese; SCD--says "heart failure" was on death certificate    Cancer Paternal Grandfather 78        colon ca and prostate ca    Eczema Other      Social History     Tobacco Use    Smoking status: Never    Smokeless tobacco: Never   Substance Use Topics    Alcohol use: Yes     Alcohol/week: 4.0 standard drinks of alcohol     Types: 3 Shots of liquor, 1 Standard drinks or equivalent per week     Comment: occ    Drug use: No        Review of Systems:  Review of Systems   Gastrointestinal:         Hemorrhoid prolapse, rectal bleeding       OBJECTIVE:     Vital " "Signs (Most Recent)  Blood Pressure 125/87 (BP Location: Right arm, Patient Position: Sitting, BP Method: Medium (Manual))   Pulse 89   Height 6' 2" (1.88 m)   Weight 93.3 kg (205 lb 11 oz)   Body Mass Index 26.41 kg/m²     Physical Exam:  General: Black or  male in no distress   Neuro: Alert and oriented to person, place, and time.  Moves all extremities.     HEENT: No icterus.  Trachea midline  Respiratory: Respirations are even and unlabored, no cough or audible wheezing  Skin: Warm dry and intact, No visible rashes, no jaundice    Labs reviewed today:  Lab Results   Component Value Date    WBC 3.09 (L) 05/04/2022    HGB 13.8 (L) 05/04/2022    HCT 41.9 05/04/2022     05/04/2022    CHOL 178 02/14/2023    TRIG 43 02/14/2023    HDL 53 02/14/2023    ALT 12 03/14/2023    AST 17 03/14/2023     03/14/2023    K 4.4 03/14/2023     03/14/2023    CREATININE 1.1 03/14/2023    BUN 15 03/14/2023    CO2 30 (H) 03/14/2023    TSH 1.416 03/14/2023    PSA 0.56 05/04/2022    HGBA1C 5.9 (H) 05/04/2022       Imaging reviewed today:  none    Endoscopy reviewed today:  none    Anorectal Exam:    Anal Skin: Normal    Digital Rectal Exam:  Resting Tone normal  Mass none  Tenderness  absent    Anoscopy:  Verbal consent was obtained.   Clear plastic anoscope inserted.    Hemorrhoids  present  Stigmata of bleeding  present  Stigmata of prolapsed  absent  Distal rectal mucosa normal      ASSESSMENT/PLAN:     Diagnoses and all orders for this visit:    Hemorrhoid prolapse  -     hydrocortisone (ANUSOL-HC) 25 mg suppository; Place 1 suppository (25 mg total) rectally 2 (two) times daily. for 24 days  -     hydrocortisone (ANUSOL-HC) 2.5 % rectal cream; Place rectally 2 (two) times daily.        The patient was instructed to:  Anusol rectally bid for 12 days  Increase water intake to at least 8-10 glasses of water per day.  Take a daily fiber supplement (Konsyl, Benefiber, Metamucil) and increase dietary " intake to 20-30 grams/day.  Avoid straining or spending >5min/event with bowel movements.  Follow-up in clinic prn if RBL desired.      AKIL Larson-SHUN  Colon and Rectal Surgery

## 2024-02-22 NOTE — PATIENT INSTRUCTIONS
Anusol cream or suppositories rectally 2x/day for 12 days.  No sitting/straining > 5 mins with bowel movement.  If no improvement or you feel you continually need to use the anusol, follow up for a rubber band ligation of hemorrhoids.  Come in when you can be off for 1-2 days after.   Have a  if you can.

## 2024-05-28 ENCOUNTER — PATIENT MESSAGE (OUTPATIENT)
Dept: SURGERY | Facility: CLINIC | Age: 39
End: 2024-05-28
Payer: OTHER GOVERNMENT

## 2024-06-03 DIAGNOSIS — K64.8 HEMORRHOID PROLAPSE: ICD-10-CM

## 2024-06-03 RX ORDER — HYDROCORTISONE 25 MG/G
CREAM TOPICAL 2 TIMES DAILY
Qty: 28 G | Refills: 2 | Status: SHIPPED | OUTPATIENT
Start: 2024-06-03

## 2024-06-26 ENCOUNTER — TELEPHONE (OUTPATIENT)
Dept: SURGERY | Facility: CLINIC | Age: 39
End: 2024-06-26
Payer: OTHER GOVERNMENT

## 2024-07-25 ENCOUNTER — OFFICE VISIT (OUTPATIENT)
Dept: SURGERY | Facility: CLINIC | Age: 39
End: 2024-07-25
Payer: OTHER GOVERNMENT

## 2024-07-25 VITALS
HEART RATE: 73 BPM | HEIGHT: 74 IN | DIASTOLIC BLOOD PRESSURE: 86 MMHG | BODY MASS INDEX: 27.25 KG/M2 | WEIGHT: 212.31 LBS | SYSTOLIC BLOOD PRESSURE: 138 MMHG

## 2024-07-25 DIAGNOSIS — K64.8 HEMORRHOID PROLAPSE: Primary | ICD-10-CM

## 2024-07-25 PROCEDURE — 99999 PR PBB SHADOW E&M-EST. PATIENT-LVL III: CPT | Mod: PBBFAC,,, | Performed by: NURSE PRACTITIONER

## 2024-07-25 PROCEDURE — 99213 OFFICE O/P EST LOW 20 MIN: CPT | Mod: PBBFAC | Performed by: NURSE PRACTITIONER

## 2024-07-25 PROCEDURE — 99212 OFFICE O/P EST SF 10 MIN: CPT | Mod: S$PBB,,, | Performed by: NURSE PRACTITIONER

## 2024-07-25 RX ORDER — HYDROCORTISONE 25 MG/G
CREAM TOPICAL 2 TIMES DAILY
Qty: 28 G | Refills: 1 | Status: SHIPPED | OUTPATIENT
Start: 2024-07-25

## 2024-07-25 NOTE — PROGRESS NOTES
"CRS Office Visit History and Physical    Referring Md:   No referring provider defined for this encounter.    SUBJECTIVE:       History of Present Illness 2/2024 w Kusum NP:  The patient is new patient to this practice.   Course is as follows:  Patient is a 38 y.o. male presents with progressively worsening hemorrhoidal prolapse with bleeding.  Symptoms have been present for years.  Has tried prep h which is no longer working; does recall using a rx in the past with good results.  Associated bleeding: yes  Previous anorectal procedures: No  confirms straining/prolonged time on toilet with bowel movements.  is not currently taking fiber supplement or stool softener  1-2 bm/day soft formed stools  Blood thinners: No     Last Colonoscopy: none  Family history of colorectal cancer or IBD: none; paternal GF with cancer though unsure as to what kind/where.    Interval hx 7/25/2024 w Jacob HORTA  No help with anusol  Still having prolapsing hemorrhoids  Has stopped prolonged time on the toilet  Going on family jorge this weekend     Review of patient's allergies indicates:   Allergen Reactions    No known drug allergies        Past Medical History:   Diagnosis Date    Allergy     Asthma     Family history of sudden cardiac death in brother 08/23/2017    Hypertension     Hypertrophic cardiomyopathy     Hypertrophic cardiomyopathy--see cardiac MRI November 2023 07/25/2017     No past surgical history on file.  Family History   Problem Relation Name Age of Onset    Diabetes Mother      Hypertension Mother      Early death Brother  23        Sleep apnea; obese; SCD--says "heart failure" was on death certificate    Cancer Paternal Grandfather  78        colon ca and prostate ca    Eczema Other son      Social History     Tobacco Use    Smoking status: Never    Smokeless tobacco: Never   Substance Use Topics    Alcohol use: Yes     Alcohol/week: 4.0 standard drinks of alcohol     Types: 3 Shots of liquor, 1 Standard drinks or " "equivalent per week     Comment: occ    Drug use: No        Review of Systems:  ROS    OBJECTIVE:     Vital Signs (Most Recent)  /86 (BP Location: Left arm, Patient Position: Sitting, BP Method: Medium (Automatic))   Pulse 73   Ht 6' 2" (1.88 m)   Wt 96.3 kg (212 lb 4.9 oz)   BMI 27.26 kg/m²     Physical Exam:  General: Black or  male in no distress   Neuro: Alert and oriented to person, place, and time.  Moves all extremities.     HEENT: No icterus.  Trachea midline  Respiratory: Respirations are even and unlabored, no cough or audible wheezing  Skin: Warm dry and intact, No visible rashes, no jaundice    Labs reviewed today:  Lab Results   Component Value Date    WBC 3.09 (L) 05/04/2022    HGB 13.8 (L) 05/04/2022    HCT 41.9 05/04/2022     05/04/2022    CHOL 178 02/14/2023    TRIG 43 02/14/2023    HDL 53 02/14/2023    ALT 12 03/14/2023    AST 17 03/14/2023     03/14/2023    K 4.4 03/14/2023     03/14/2023    CREATININE 1.1 03/14/2023    BUN 15 03/14/2023    CO2 30 (H) 03/14/2023    TSH 1.416 03/14/2023    PSA 0.56 05/04/2022    HGBA1C 5.9 (H) 05/04/2022       Anorectal Exam:    Anal Skin: Normal    Digital Rectal Exam:  Resting Tone normal  Mass soft hemorrhoids  No pain    Anoscopy:  deferred      ASSESSMENT/PLAN:     Diagnoses and all orders for this visit:    Hemorrhoid prolapse  -     hydrocortisone (ANUSOL-HC) 2.5 % rectal cream; Place rectally 2 (two) times daily.      38 y.o. M here today for RBL. However going on vacation in a few days. We agreed lets do this next Friday since he has the weekend off in case he has pain.       AKIL Pickard-C  Colon and Rectal Surgery      "

## 2024-08-02 ENCOUNTER — OFFICE VISIT (OUTPATIENT)
Dept: SURGERY | Facility: CLINIC | Age: 39
End: 2024-08-02
Payer: OTHER GOVERNMENT

## 2024-08-02 VITALS
BODY MASS INDEX: 26.49 KG/M2 | DIASTOLIC BLOOD PRESSURE: 78 MMHG | WEIGHT: 206.38 LBS | HEART RATE: 73 BPM | HEIGHT: 74 IN | SYSTOLIC BLOOD PRESSURE: 147 MMHG

## 2024-08-02 DIAGNOSIS — K64.8 HEMORRHOID PROLAPSE: Primary | ICD-10-CM

## 2024-08-02 PROCEDURE — 99999 PR PBB SHADOW E&M-EST. PATIENT-LVL III: CPT | Mod: PBBFAC,,, | Performed by: NURSE PRACTITIONER

## 2024-08-02 PROCEDURE — 99213 OFFICE O/P EST LOW 20 MIN: CPT | Mod: PBBFAC | Performed by: NURSE PRACTITIONER

## 2024-08-02 RX ORDER — OXYCODONE HYDROCHLORIDE 5 MG/1
5 TABLET ORAL EVERY 6 HOURS PRN
Qty: 7 TABLET | Refills: 0 | Status: SHIPPED | OUTPATIENT
Start: 2024-08-02

## 2024-08-02 NOTE — PROGRESS NOTES
"CRS Office Visit History and Physical    Referring Md:   No referring provider defined for this encounter.    SUBJECTIVE:         History of Present Illness 2/2024 w Kusum NP:  The patient is new patient to this practice.   Course is as follows:  Patient is a 38 y.o. male presents with progressively worsening hemorrhoidal prolapse with bleeding.  Symptoms have been present for years.  Has tried prep h which is no longer working; does recall using a rx in the past with good results.  Associated bleeding: yes  Previous anorectal procedures: No  confirms straining/prolonged time on toilet with bowel movements.  is not currently taking fiber supplement or stool softener  1-2 bm/day soft formed stools  Blood thinners: No     Last Colonoscopy: none  Family history of colorectal cancer or IBD: none; paternal GF with cancer though unsure as to what kind/where.     Interval hx 7/25/2024 w Jacob HORTA  No help with anusol  Still having prolapsing hemorrhoids  Has stopped prolonged time on the toilet  Going on family jorge this weekend       Interval hx 8/2/2024  Pt here for RBL. Continues w prolapse.    Review of patient's allergies indicates:   Allergen Reactions    No known drug allergies        Past Medical History:   Diagnosis Date    Allergy     Asthma     Family history of sudden cardiac death in brother 08/23/2017    Hypertension     Hypertrophic cardiomyopathy     Hypertrophic cardiomyopathy--see cardiac MRI November 2023 07/25/2017     No past surgical history on file.  Family History   Problem Relation Name Age of Onset    Diabetes Mother      Hypertension Mother      Early death Brother  23        Sleep apnea; obese; SCD--says "heart failure" was on death certificate    Cancer Paternal Grandfather  78        colon ca and prostate ca    Eczema Other son      Social History     Tobacco Use    Smoking status: Never    Smokeless tobacco: Never   Substance Use Topics    Alcohol use: Yes     Alcohol/week: 4.0 standard " "drinks of alcohol     Types: 3 Shots of liquor, 1 Standard drinks or equivalent per week     Comment: occ    Drug use: No        Review of Systems:  ROS    OBJECTIVE:     Vital Signs (Most Recent)  BP (!) 147/78 (BP Location: Left forearm, Patient Position: Sitting, BP Method: Medium (Automatic))   Pulse 73   Ht 6' 2" (1.88 m)   Wt 93.6 kg (206 lb 5.6 oz)   BMI 26.49 kg/m²     Physical Exam:  General: Black or  male in no distress   Neuro: Alert and oriented to person, place, and time.  Moves all extremities.     HEENT: No icterus.  Trachea midline  Respiratory: Respirations are even and unlabored, no cough or audible wheezing  Skin: Warm dry and intact, No visible rashes, no jaundice    Labs reviewed today:  Lab Results   Component Value Date    WBC 3.09 (L) 05/04/2022    HGB 13.8 (L) 05/04/2022    HCT 41.9 05/04/2022     05/04/2022    CHOL 178 02/14/2023    TRIG 43 02/14/2023    HDL 53 02/14/2023    ALT 12 03/14/2023    AST 17 03/14/2023     03/14/2023    K 4.4 03/14/2023     03/14/2023    CREATININE 1.1 03/14/2023    BUN 15 03/14/2023    CO2 30 (H) 03/14/2023    TSH 1.416 03/14/2023    PSA 0.56 05/04/2022    HGBA1C 5.9 (H) 05/04/2022     Anorectal Exam:    Anal Skin: Normal    Digital Rectal Exam:  Resting Tone normal  Mass soft, bouncy, hemorrhoid like lesions  Tenderness  absent    Anoscopy:  Verbal consent was obtained.   Clear plastic anoscope inserted.    Hemorrhoids  present  Stigmata of bleeding  present  Stigmata of prolapsed  present  Distal rectal mucosa normal    Rubber Band Ligation:  Pt understands risks vs benefits of procedure, agreeable to procedure  Suction applicator was placed above the dentate line.   Rubber bands were deployed in the L Lateral and R posterior position.    Patient tolerated the procedure well.       ASSESSMENT/PLAN:     Diagnoses and all orders for this visit:    Hemorrhoid prolapse  -     oxyCODONE (ROXICODONE) 5 MG immediate release " tablet; Take 1 tablet (5 mg total) by mouth every 6 (six) hours as needed for Pain.    38 y.o. M here with above. RBL today. Tolerated well. May need to RTC for repeat PRN    The patient was instructed to:  Instructions following hemorrhoidal banding    DIET:  High fiber diet to keep stool soft and to prevent constipation. Eat plenty of fruits and vegetables. Drink 8-10 glasses of water per day. Salads with raw veggies or whole grains are good options    Stool  (fiber supplement):  Metamucil, citrucel , fibercon, or benefiber daily. Make sure to drink plenty of water with the fiber    Pain control:  Sit in a warm tub of water to relieve minor discomfort. Tylenol can be taken for moderate pain. Do not take aspirin for 10 days following procedure unless ordered by your physician.     Complications:  1) A small amount of bright red blood can be expected. If bleeding persists or if it is greater greater than 1 cup of blood call your doctor.   2) If you have severe pain, fever greater than 101 or if you are unable to urinate within 6 hours of following hemorrhoidal treatment, call your doctor    Phone numbers: [facility] clinic [phone], 8 am to 5pm Mon- Friday. If you have problems with pain, bleeding, fever, or problems urinating after 5 pm please call [phone] and ask for the doctor on call for the colon and rectal department.     LILIBETH Pickard  Colon and Rectal Surgery

## 2024-08-02 NOTE — PATIENT INSTRUCTIONS
Instructions following hemorrhoidal banding    DIET:  High fiber diet to keep stool soft and to prevent constipation. Eat plenty of fruits and vegetables. Drink 8-10 glasses of water per day. Salads with raw veggies or whole grains are good options    Stool  (fiber supplement):  Metamucil, citrucel , fibercon, or benefiber daily. Make sure to drink plenty of water with the fiber    Pain control:  Sit in a warm tub of water to relieve minor discomfort. Tylenol can be taken for moderate pain. Do not take aspirin for 10 days following procedure unless ordered by your physician.     Complications:  1) A small amount of bright red blood can be expected. If bleeding persists or if it is greater greater than 1 cup of blood call your doctor.   2) If you have severe pain, fever greater than 101 or if you are unable to urinate within 6 hours of following hemorrhoidal treatment, call your doctor    Phone numbers: [facility] clinic [phone], 8 am to 5pm Mon- Friday. If you have problems with pain, bleeding, fever, or problems urinating after 5 pm please call [phone] and ask for the doctor on call for the colon and rectal department.

## 2024-08-08 ENCOUNTER — PATIENT MESSAGE (OUTPATIENT)
Dept: SURGERY | Facility: CLINIC | Age: 39
End: 2024-08-08
Payer: OTHER GOVERNMENT

## 2024-11-22 ENCOUNTER — PATIENT MESSAGE (OUTPATIENT)
Dept: TRANSPLANT | Facility: CLINIC | Age: 39
End: 2024-11-22
Payer: OTHER GOVERNMENT

## 2024-12-16 ENCOUNTER — OFFICE VISIT (OUTPATIENT)
Dept: URGENT CARE | Facility: CLINIC | Age: 39
End: 2024-12-16
Payer: OTHER GOVERNMENT

## 2024-12-16 VITALS
HEIGHT: 74 IN | BODY MASS INDEX: 26.31 KG/M2 | WEIGHT: 205 LBS | HEART RATE: 102 BPM | TEMPERATURE: 99 F | OXYGEN SATURATION: 96 % | DIASTOLIC BLOOD PRESSURE: 83 MMHG | SYSTOLIC BLOOD PRESSURE: 146 MMHG | RESPIRATION RATE: 18 BRPM

## 2024-12-16 DIAGNOSIS — R05.9 COUGH, UNSPECIFIED TYPE: Primary | ICD-10-CM

## 2024-12-16 DIAGNOSIS — R09.81 SINUS CONGESTION: ICD-10-CM

## 2024-12-16 DIAGNOSIS — J02.9 SORE THROAT: ICD-10-CM

## 2024-12-16 DIAGNOSIS — J10.1 INFLUENZA A: ICD-10-CM

## 2024-12-16 LAB
CTP QC/QA: YES
CTP QC/QA: YES
MOLECULAR STREP A: NEGATIVE
POC MOLECULAR INFLUENZA A AGN: POSITIVE
POC MOLECULAR INFLUENZA B AGN: NEGATIVE

## 2024-12-16 PROCEDURE — 87502 INFLUENZA DNA AMP PROBE: CPT | Mod: QW,S$GLB,, | Performed by: FAMILY MEDICINE

## 2024-12-16 PROCEDURE — 99214 OFFICE O/P EST MOD 30 MIN: CPT | Mod: S$GLB,,, | Performed by: FAMILY MEDICINE

## 2024-12-16 PROCEDURE — 87651 STREP A DNA AMP PROBE: CPT | Mod: QW,S$GLB,, | Performed by: FAMILY MEDICINE

## 2024-12-16 RX ORDER — CYCLOBENZAPRINE HCL 5 MG
5 TABLET ORAL
COMMUNITY
Start: 2024-10-28

## 2024-12-16 RX ORDER — OSELTAMIVIR PHOSPHATE 75 MG/1
75 CAPSULE ORAL 2 TIMES DAILY
Qty: 10 CAPSULE | Refills: 0 | Status: SHIPPED | OUTPATIENT
Start: 2024-12-16 | End: 2024-12-21

## 2024-12-16 RX ORDER — RIMEGEPANT SULFATE 75 MG/75MG
TABLET, ORALLY DISINTEGRATING ORAL
COMMUNITY
Start: 2024-06-10 | End: 2025-06-11

## 2024-12-16 RX ORDER — OSELTAMIVIR PHOSPHATE 75 MG/1
75 CAPSULE ORAL 2 TIMES DAILY
Qty: 10 CAPSULE | Refills: 0 | Status: SHIPPED | OUTPATIENT
Start: 2024-12-16 | End: 2024-12-16

## 2024-12-16 NOTE — PROGRESS NOTES
"Subjective:      Patient ID: Kurt Shelley is a 39 y.o. male.    Vitals:  height is 6' 2" (1.88 m) and weight is 93 kg (205 lb). His oral temperature is 99 °F (37.2 °C). His blood pressure is 146/83 (abnormal) and his pulse is 102. His respiration is 18 and oxygen saturation is 96%.     Chief Complaint: Cough    Pt is here c/o cough, headaches, nausea, body-aches, congestion and mild sore throat. He states his son tested + for flu and strep.  Patient denies chest pain, SOB, abdominal pain, nausea/vomiting, diarrhea, dysuria.    Cough  This is a new problem. The current episode started yesterday. The problem has been unchanged. Associated symptoms include headaches, nasal congestion, postnasal drip and a sore throat. He has tried nothing for the symptoms.       HENT:  Positive for postnasal drip and sore throat.    Respiratory:  Positive for cough.    Neurological:  Positive for headaches.      Objective:     Physical Exam   Constitutional: He is oriented to person, place, and time. He appears well-developed. He is cooperative.  Non-toxic appearance. He does not appear ill. No distress.   HENT:   Head: Normocephalic and atraumatic.   Ears:   Right Ear: Hearing, tympanic membrane, external ear and ear canal normal. no impacted cerumen  Left Ear: Hearing, tympanic membrane, external ear and ear canal normal. no impacted cerumen  Nose: Congestion present. No mucosal edema, rhinorrhea or nasal deformity. No epistaxis. Right sinus exhibits no maxillary sinus tenderness and no frontal sinus tenderness. Left sinus exhibits no maxillary sinus tenderness and no frontal sinus tenderness.   Mouth/Throat: Uvula is midline, oropharynx is clear and moist and mucous membranes are normal. No trismus in the jaw. Normal dentition. No uvula swelling. No oropharyngeal exudate or posterior oropharyngeal edema.      Comments: +ve pharyngeal erythema w/o tonsillar swelling or exudates.        Eyes: Conjunctivae and lids are normal. No " scleral icterus.   Neck: Trachea normal and phonation normal. Neck supple. No edema present. No erythema present. No neck rigidity present.   Cardiovascular: Normal rate, regular rhythm, normal heart sounds and normal pulses.   No murmur heard.  Pulmonary/Chest: Effort normal and breath sounds normal. No stridor. No respiratory distress. He has no decreased breath sounds. He has no wheezes. He has no rhonchi. He has no rales.   Abdominal: Normal appearance. He exhibits no distension. There is no abdominal tenderness. There is no left CVA tenderness and no right CVA tenderness.   Musculoskeletal: Normal range of motion.         General: No deformity. Normal range of motion.      Cervical back: He exhibits no tenderness.   Lymphadenopathy:     He has no cervical adenopathy.   Neurological: He is alert, oriented to person, place, and time and at baseline. He exhibits normal muscle tone. Coordination normal.   Skin: Skin is warm, dry, intact, not diaphoretic and not pale.   Psychiatric: His speech is normal and behavior is normal. Judgment and thought content normal.   Nursing note and vitals reviewed.      Assessment:     1. Cough, unspecified type    2. Sore throat    3. Influenza A    4. Sinus congestion        Plan:   Discussed exam findings/results/diagnosis/plan with patient. Advised to f/u with PCP within 2-5 days. ER precautions given if symptoms get any worse. All questions answered. Patient verbally understood and agreed with treatment plan.  Educational materials and instructions regarding the visit diagnosis and management provided.     Cough, unspecified type  -     POCT Influenza A/B MOLECULAR    Sore throat  -     POCT Strep A, Molecular    Influenza A    Sinus congestion    Other orders  -     oseltamivir (TAMIFLU) 75 MG capsule; Take 1 capsule (75 mg total) by mouth 2 (two) times daily. for 5 days  Dispense: 10 capsule; Refill: 0

## 2024-12-28 ENCOUNTER — OFFICE VISIT (OUTPATIENT)
Dept: URGENT CARE | Facility: CLINIC | Age: 39
End: 2024-12-28
Payer: OTHER GOVERNMENT

## 2024-12-28 VITALS
TEMPERATURE: 98 F | RESPIRATION RATE: 17 BRPM | OXYGEN SATURATION: 99 % | HEART RATE: 77 BPM | BODY MASS INDEX: 26.31 KG/M2 | HEIGHT: 74 IN | SYSTOLIC BLOOD PRESSURE: 135 MMHG | WEIGHT: 205 LBS | DIASTOLIC BLOOD PRESSURE: 79 MMHG

## 2024-12-28 DIAGNOSIS — J02.9 SORE THROAT: ICD-10-CM

## 2024-12-28 DIAGNOSIS — J02.9 ACUTE VIRAL PHARYNGITIS: Primary | ICD-10-CM

## 2024-12-28 LAB
CTP QC/QA: YES
CTP QC/QA: YES
MOLECULAR STREP A: NEGATIVE
SARS-COV-2 AG RESP QL IA.RAPID: NEGATIVE

## 2024-12-28 PROCEDURE — 87811 SARS-COV-2 COVID19 W/OPTIC: CPT | Mod: QW,S$GLB,, | Performed by: NURSE PRACTITIONER

## 2024-12-28 PROCEDURE — 87651 STREP A DNA AMP PROBE: CPT | Mod: QW,S$GLB,, | Performed by: NURSE PRACTITIONER

## 2024-12-28 PROCEDURE — 99213 OFFICE O/P EST LOW 20 MIN: CPT | Mod: S$GLB,,, | Performed by: NURSE PRACTITIONER

## 2024-12-28 NOTE — PATIENT INSTRUCTIONS

## 2024-12-28 NOTE — PROGRESS NOTES
"Subjective:      Patient ID: Kurt Shelley is a 39 y.o. male.    Vitals:  height is 6' 2" (1.88 m) and weight is 93 kg (205 lb). His oral temperature is 98.1 °F (36.7 °C). His blood pressure is 135/79 and his pulse is 77. His respiration is 17 and oxygen saturation is 99%.     Chief Complaint: Sore Throat    Pt has a sore throat. Pt symptoms started onset today. Pt hasn't tried treating his symptoms with any medication. Pt pain scale is 8/10    Sore Throat   This is a new problem. The current episode started today. The problem has been unchanged. There has been no fever. The pain is at a severity of 8/10. The pain is moderate. Pertinent negatives include no abdominal pain, coughing, ear discharge, ear pain, neck pain, shortness of breath or vomiting. The treatment provided no relief.       Constitution: Negative. Negative for chills, fatigue and fever.   HENT:  Positive for sore throat. Negative for ear pain, ear discharge, facial swelling and sinus pressure.    Neck: Negative for neck pain, neck stiffness and painful lymph nodes.   Cardiovascular: Negative.  Negative for chest pain and sob on exertion.   Eyes: Negative.  Negative for eye itching, eye pain and eye redness.   Respiratory: Negative.  Negative for chest tightness, cough, shortness of breath, wheezing and asthma.    Gastrointestinal: Negative.  Negative for abdominal pain, nausea and vomiting.   Endocrine: negative. excessive thirst.   Genitourinary: Negative.  Negative for dysuria, frequency, urgency and flank pain.   Musculoskeletal: Negative.  Negative for pain, trauma, joint pain and joint swelling.   Skin: Negative.  Negative for rash, wound, lesion and hives.   Allergic/Immunologic: Negative.  Negative for eczema, asthma, hives, itching and sneezing.   Neurological: Negative.  Negative for dizziness, passing out, disorientation and altered mental status.   Hematologic/Lymphatic: Negative.  Negative for swollen lymph nodes. "   Psychiatric/Behavioral: Negative.  Negative for altered mental status, disorientation and confusion.       Objective:     Physical Exam   Constitutional: He is oriented to person, place, and time. He appears well-developed. He is cooperative.  Non-toxic appearance. He does not appear ill. No distress.   HENT:   Head: Normocephalic and atraumatic.   Ears:   Right Ear: Hearing, tympanic membrane, external ear and ear canal normal. no impacted cerumen  Left Ear: Hearing, tympanic membrane, external ear and ear canal normal. no impacted cerumen  Nose: Nose normal. No mucosal edema, rhinorrhea, nasal deformity or congestion. No epistaxis. Right sinus exhibits no maxillary sinus tenderness and no frontal sinus tenderness. Left sinus exhibits no maxillary sinus tenderness and no frontal sinus tenderness.   Mouth/Throat: Uvula is midline and mucous membranes are normal. No trismus in the jaw. Normal dentition. No uvula swelling. Posterior oropharyngeal erythema (MIld) present. No oropharyngeal exudate, posterior oropharyngeal edema, tonsillar abscesses or cobblestoning. Tonsils are 0 on the right. Tonsils are 0 on the left. No tonsillar exudate.   Eyes: Conjunctivae and lids are normal. No scleral icterus.   Neck: Trachea normal and phonation normal. Neck supple. No edema present. No erythema present. No neck rigidity present.   Cardiovascular: Normal rate, regular rhythm, normal heart sounds and normal pulses.   Pulmonary/Chest: Effort normal and breath sounds normal. No stridor. No respiratory distress. He has no decreased breath sounds. He has no wheezes. He has no rhonchi. He has no rales. He exhibits no tenderness.   Abdominal: Normal appearance.   Musculoskeletal: Normal range of motion.         General: No deformity. Normal range of motion.   Lymphadenopathy:     He has no cervical adenopathy.   Neurological: no focal deficit. He is alert, oriented to person, place, and time and at baseline. He exhibits normal  muscle tone. Coordination normal.   Skin: Skin is warm, dry, intact, not diaphoretic and not pale.   Psychiatric: His speech is normal and behavior is normal. Mood, judgment and thought content normal.   Nursing note and vitals reviewed.    Results for orders placed or performed in visit on 12/28/24   SARS Coronavirus 2 Antigen, POCT Manual Read    Collection Time: 12/28/24 10:18 AM   Result Value Ref Range    SARS Coronavirus 2 Antigen Negative Negative     Acceptable Yes    POCT Strep A, Molecular    Collection Time: 12/28/24 10:22 AM   Result Value Ref Range    Molecular Strep A, POC Negative Negative     Acceptable Yes          Assessment:     1. Acute viral pharyngitis    2. Sore throat        Plan:   FOLLOWUP  Follow up if symptoms worsen or fail to improve, for PLEASE CONTACT PCP OR CONTACT THE EMERGENCY ROOM..     PATIENT INSTRUCTIONS  Patient Instructions   INSTRUCTIONS:  - Rest.  - Drink plenty of fluids.  - Take Tylenol and/or Ibuprofen as directed as needed for fever/pain.  Do not take more than the recommended dose.  - follow up with your PCP within the next 1-2 weeks as needed.  - You must understand that you have received an Urgent Care treatment only and that you may be released before all of your medical problems are known or treated.   - You, the patient, will arrange for follow up care as instructed.   - If your condition worsens or fails to improve we recommend that you receive another evaluation at the ER immediately or contact your PCP to discuss your concerns.   - You can call [phone] or [phone] to help schedule an appointment with the appropriate provider.     -If you smoke cigarettes, it would be beneficial for you to stop.         THANK YOU FOR ALLOWING ME TO PARTICIPATE IN YOUR HEALTHCARE,     Joshua Díaz NP     Acute viral pharyngitis    Sore throat  -     POCT Strep A, Molecular  -     SARS Coronavirus 2 Antigen, POCT Manual  Read

## 2025-02-19 ENCOUNTER — OFFICE VISIT (OUTPATIENT)
Dept: PRIMARY CARE CLINIC | Facility: CLINIC | Age: 40
End: 2025-02-19
Payer: COMMERCIAL

## 2025-02-19 ENCOUNTER — LAB VISIT (OUTPATIENT)
Dept: LAB | Facility: HOSPITAL | Age: 40
End: 2025-02-19
Attending: FAMILY MEDICINE
Payer: OTHER GOVERNMENT

## 2025-02-19 VITALS
HEART RATE: 74 BPM | SYSTOLIC BLOOD PRESSURE: 128 MMHG | WEIGHT: 213.88 LBS | DIASTOLIC BLOOD PRESSURE: 72 MMHG | RESPIRATION RATE: 18 BRPM | BODY MASS INDEX: 27.45 KG/M2 | OXYGEN SATURATION: 98 % | HEIGHT: 74 IN

## 2025-02-19 DIAGNOSIS — I42.2 HYPERTROPHIC CARDIOMYOPATHY: ICD-10-CM

## 2025-02-19 DIAGNOSIS — Z00.00 ROUTINE MEDICAL EXAM: Primary | ICD-10-CM

## 2025-02-19 DIAGNOSIS — Z00.00 ROUTINE MEDICAL EXAM: ICD-10-CM

## 2025-02-19 DIAGNOSIS — I10 ESSENTIAL HYPERTENSION: Chronic | ICD-10-CM

## 2025-02-19 LAB
ALBUMIN SERPL BCP-MCNC: 4.2 G/DL (ref 3.5–5.2)
ALP SERPL-CCNC: 66 U/L (ref 40–150)
ALT SERPL W/O P-5'-P-CCNC: 25 U/L (ref 10–44)
ANION GAP SERPL CALC-SCNC: 9 MMOL/L (ref 8–16)
AST SERPL-CCNC: 34 U/L (ref 10–40)
BILIRUB SERPL-MCNC: 0.6 MG/DL (ref 0.1–1)
BUN SERPL-MCNC: 10 MG/DL (ref 6–20)
CALCIUM SERPL-MCNC: 9.8 MG/DL (ref 8.7–10.5)
CHLORIDE SERPL-SCNC: 105 MMOL/L (ref 95–110)
CHOLEST SERPL-MCNC: 179 MG/DL (ref 120–199)
CHOLEST/HDLC SERPL: 2.9 {RATIO} (ref 2–5)
CO2 SERPL-SCNC: 25 MMOL/L (ref 23–29)
COMPLEXED PSA SERPL-MCNC: 0.72 NG/ML (ref 0–4)
CREAT SERPL-MCNC: 1 MG/DL (ref 0.5–1.4)
ERYTHROCYTE [DISTWIDTH] IN BLOOD BY AUTOMATED COUNT: 14.1 % (ref 11.5–14.5)
EST. GFR  (NO RACE VARIABLE): >60 ML/MIN/1.73 M^2
ESTIMATED AVG GLUCOSE: 128 MG/DL (ref 68–131)
FERRITIN SERPL-MCNC: 89 NG/ML (ref 20–300)
GLUCOSE SERPL-MCNC: 104 MG/DL (ref 70–110)
HBA1C MFR BLD: 6.1 % (ref 4–5.6)
HCT VFR BLD AUTO: 42.5 % (ref 40–54)
HDLC SERPL-MCNC: 61 MG/DL (ref 40–75)
HDLC SERPL: 34.1 % (ref 20–50)
HGB BLD-MCNC: 13.7 G/DL (ref 14–18)
IRON SERPL-MCNC: 97 UG/DL (ref 45–160)
LDLC SERPL CALC-MCNC: 110 MG/DL (ref 63–159)
MCH RBC QN AUTO: 28.3 PG (ref 27–31)
MCHC RBC AUTO-ENTMCNC: 32.2 G/DL (ref 32–36)
MCV RBC AUTO: 88 FL (ref 82–98)
NONHDLC SERPL-MCNC: 118 MG/DL
PLATELET # BLD AUTO: 235 K/UL (ref 150–450)
PMV BLD AUTO: 11.3 FL (ref 9.2–12.9)
POTASSIUM SERPL-SCNC: 4.2 MMOL/L (ref 3.5–5.1)
PROT SERPL-MCNC: 7.4 G/DL (ref 6–8.4)
RBC # BLD AUTO: 4.84 M/UL (ref 4.6–6.2)
SATURATED IRON: 28 % (ref 20–50)
SODIUM SERPL-SCNC: 139 MMOL/L (ref 136–145)
TOTAL IRON BINDING CAPACITY: 349 UG/DL (ref 250–450)
TRANSFERRIN SERPL-MCNC: 236 MG/DL (ref 200–375)
TRIGL SERPL-MCNC: 40 MG/DL (ref 30–150)
TSH SERPL DL<=0.005 MIU/L-ACNC: 0.9 UIU/ML (ref 0.4–4)
WBC # BLD AUTO: 3.28 K/UL (ref 3.9–12.7)

## 2025-02-19 PROCEDURE — 84153 ASSAY OF PSA TOTAL: CPT | Performed by: FAMILY MEDICINE

## 2025-02-19 PROCEDURE — 80061 LIPID PANEL: CPT | Performed by: FAMILY MEDICINE

## 2025-02-19 PROCEDURE — 83540 ASSAY OF IRON: CPT | Performed by: FAMILY MEDICINE

## 2025-02-19 PROCEDURE — 1160F RVW MEDS BY RX/DR IN RCRD: CPT | Mod: CPTII,S$GLB,, | Performed by: FAMILY MEDICINE

## 2025-02-19 PROCEDURE — 36415 COLL VENOUS BLD VENIPUNCTURE: CPT | Mod: PN | Performed by: FAMILY MEDICINE

## 2025-02-19 PROCEDURE — 84443 ASSAY THYROID STIM HORMONE: CPT | Performed by: FAMILY MEDICINE

## 2025-02-19 PROCEDURE — 3074F SYST BP LT 130 MM HG: CPT | Mod: CPTII,S$GLB,, | Performed by: FAMILY MEDICINE

## 2025-02-19 PROCEDURE — 85027 COMPLETE CBC AUTOMATED: CPT | Performed by: FAMILY MEDICINE

## 2025-02-19 PROCEDURE — 3078F DIAST BP <80 MM HG: CPT | Mod: CPTII,S$GLB,, | Performed by: FAMILY MEDICINE

## 2025-02-19 PROCEDURE — 1159F MED LIST DOCD IN RCRD: CPT | Mod: CPTII,S$GLB,, | Performed by: FAMILY MEDICINE

## 2025-02-19 PROCEDURE — 80053 COMPREHEN METABOLIC PANEL: CPT | Performed by: FAMILY MEDICINE

## 2025-02-19 PROCEDURE — 83036 HEMOGLOBIN GLYCOSYLATED A1C: CPT | Performed by: FAMILY MEDICINE

## 2025-02-19 PROCEDURE — 99999 PR PBB SHADOW E&M-EST. PATIENT-LVL IV: CPT | Mod: PBBFAC,,, | Performed by: FAMILY MEDICINE

## 2025-02-19 PROCEDURE — 82728 ASSAY OF FERRITIN: CPT | Performed by: FAMILY MEDICINE

## 2025-02-19 PROCEDURE — 3008F BODY MASS INDEX DOCD: CPT | Mod: CPTII,S$GLB,, | Performed by: FAMILY MEDICINE

## 2025-02-19 PROCEDURE — 99396 PREV VISIT EST AGE 40-64: CPT | Mod: S$GLB,,, | Performed by: FAMILY MEDICINE

## 2025-02-19 PROCEDURE — 3044F HG A1C LEVEL LT 7.0%: CPT | Mod: CPTII,S$GLB,, | Performed by: FAMILY MEDICINE

## 2025-02-19 RX ORDER — SILDENAFIL 100 MG/1
100 TABLET, FILM COATED ORAL
COMMUNITY
Start: 2024-09-30

## 2025-02-22 ENCOUNTER — RESULTS FOLLOW-UP (OUTPATIENT)
Dept: PRIMARY CARE CLINIC | Facility: CLINIC | Age: 40
End: 2025-02-22

## 2025-02-23 NOTE — PROGRESS NOTES
"      /72 (BP Location: Right arm, Patient Position: Sitting)   Pulse 74   Resp 18   Ht 6' 2" (1.88 m)   Wt 97 kg (213 lb 13.5 oz)   SpO2 98%   BMI 27.46 kg/m²       ===========              Kurt Rosario Shelley is a 39 y.o. male     here for    Annual exam.    Health maintenance reviewed with patient in detail inc any recent labs and studies and needs for future screening labs.  Age-appropriate vaccines and other age-appropriate screening studies reviewed with patient in detail.  Sleep health reviewed with patient.  Skin health regarding possible skin cancer screening reviewed with patient.  General regularity of bowel movements and urinations reviewed with patient including any possibility of urine leakage.  Vision screening reviewed with patient.      Patient queried and denies any further complaints      Problem List[1]    SURGICAL AND MEDICAL HISTORY: updated and reviewed.  History reviewed. No pertinent surgical history.  ALLERGIES updated and reviewed.  Review of patient's allergies indicates:   Allergen Reactions    No known drug allergies        CURRENT OUTPATIENT MEDICATIONS updated and reviewed  Current Medications[2]    Review of Systems   Constitutional:  Negative for activity change, appetite change, chills, diaphoresis, fatigue, fever and unexpected weight change.   HENT:  Negative for congestion, ear discharge, ear pain, facial swelling, hearing loss, nosebleeds, postnasal drip, rhinorrhea, sinus pressure, sneezing, sore throat, tinnitus, trouble swallowing and voice change.    Eyes:  Negative for photophobia, pain, discharge, redness, itching and visual disturbance.   Respiratory:  Negative for cough, chest tightness, shortness of breath and wheezing.    Cardiovascular:  Negative for chest pain, palpitations and leg swelling.   Gastrointestinal:  Negative for abdominal distention, abdominal pain, anal bleeding, blood in stool, constipation, diarrhea, nausea, rectal pain and vomiting. " "  Endocrine: Negative for cold intolerance, heat intolerance, polydipsia, polyphagia and polyuria.   Genitourinary:  Negative for difficulty urinating, dysuria and flank pain.   Musculoskeletal:  Negative for arthralgias, back pain, joint swelling, myalgias and neck pain.   Skin:  Negative for rash.   Neurological:  Negative for dizziness, tremors, seizures, syncope, speech difficulty, weakness, light-headedness, numbness and headaches.   Psychiatric/Behavioral:  Negative for behavioral problems, confusion, decreased concentration, dysphoric mood, sleep disturbance and suicidal ideas. The patient is not nervous/anxious and is not hyperactive.        /72 (BP Location: Right arm, Patient Position: Sitting)   Pulse 74   Resp 18   Ht 6' 2" (1.88 m)   Wt 97 kg (213 lb 13.5 oz)   SpO2 98%   BMI 27.46 kg/m²   Physical Exam  Vitals and nursing note reviewed.   Constitutional:       General: He is not in acute distress.     Appearance: Normal appearance. He is well-developed. He is not ill-appearing or toxic-appearing.   HENT:      Head: Normocephalic and atraumatic.      Right Ear: Tympanic membrane, ear canal and external ear normal.      Left Ear: Tympanic membrane, ear canal and external ear normal.      Nose: Nose normal.      Mouth/Throat:      Lips: Pink.      Mouth: Mucous membranes are moist.      Pharynx: No oropharyngeal exudate or posterior oropharyngeal erythema.   Eyes:      General: No scleral icterus.        Right eye: No discharge.         Left eye: No discharge.      Extraocular Movements: Extraocular movements intact.      Conjunctiva/sclera: Conjunctivae normal.   Cardiovascular:      Rate and Rhythm: Normal rate and regular rhythm.      Pulses: Normal pulses.      Heart sounds: Normal heart sounds. No murmur heard.  Pulmonary:      Effort: Pulmonary effort is normal. No respiratory distress.      Breath sounds: Normal breath sounds. No wheezing or rales.   Abdominal:      General: Bowel " "sounds are normal. There is no distension.      Palpations: Abdomen is soft. There is no mass.      Tenderness: There is no abdominal tenderness. There is no right CVA tenderness, left CVA tenderness, guarding or rebound.      Hernia: No hernia is present.   Musculoskeletal:      Cervical back: Normal range of motion and neck supple. No rigidity or tenderness.   Lymphadenopathy:      Cervical: No cervical adenopathy.   Skin:     General: Skin is warm and dry.   Neurological:      General: No focal deficit present.      Mental Status: He is alert. Mental status is at baseline.   Psychiatric:         Mood and Affect: Mood normal.         Behavior: Behavior normal. Behavior is cooperative.         ASSESSMENT/PLAN    Kurt Esquivel" was seen today for annual exam.    Diagnoses and all orders for this visit:    Routine medical exam  -     PSA, Screening; Future  -     CBC Without Differential; Future  -     Comprehensive Metabolic Panel; Future  -     Hemoglobin A1C; Future  -     Lipid Panel; Future  -     TSH; Future  -     Iron and TIBC; Future  -     Ferritin; Future    Hypertrophic cardiomyopathy  -     Echo; Future    Essential hypertension            Most recent some lab results reviewed with patient.  Any new prescription medications gone over in detail including reason for taking the medication, most common possible side effects and possible costs, etcetera.    Chronic conditions updated. Other than changes or additions as above, cont current medications and maintain follow-up with specialists if indicated.     - Cautioned the patient against excessive use of internet searches for interpreting results before professional review.     Joshua Dodson MD  A dictation device was used to produce this document. Use of such devices sometimes results in grammatical errors or replacement of words that sound similarly.                         [1]   Patient Active Problem List  Diagnosis    Allergic rhinitis due to " pollen    Abnormal ECG    Hypertrophic cardiomyopathy--see cardiac MRI November 2023    Essential hypertension    Osteoarthritis of left knee   [2]   Current Outpatient Medications:     albuterol (VENTOLIN HFA) 90 mcg/actuation inhaler, Inhale 2 puffs into the lungs every 6 (six) hours as needed for Wheezing. Rescue, Disp: 18 g, Rfl: 0    cetirizine (ZYRTEC) 10 MG tablet, Take 10 mg by mouth daily as needed for Allergies., Disp: , Rfl:     chlorthalidone (HYGROTEN) 25 MG Tab, Take 0.5 tablets (12.5 mg total) by mouth once daily., Disp: 45 tablet, Rfl: 3    cyclobenzaprine (FLEXERIL) 5 MG tablet, Take 5 mg by mouth., Disp: , Rfl:     hydrocortisone (ANUSOL-HC) 2.5 % rectal cream, Place rectally 2 (two) times daily., Disp: 28 g, Rfl: 1    LORazepam (ATIVAN) 1 MG tablet, Take 2 tablets 4 hours before test and repeat 1 or 2 tablets 4 hrs later if still not relaxed, Disp: 4 tablet, Rfl: 0    oxyCODONE (ROXICODONE) 5 MG immediate release tablet, Take 1 tablet (5 mg total) by mouth every 6 (six) hours as needed for Pain., Disp: 7 tablet, Rfl: 0    rimegepant (NURTEC) 75 mg odt, DISSOLVE ONE TABLET UNDER THE TONGUE ONCE DAILY AS NEEDED FOR MIGRAINES PLACE ONE TABLET UNDER THE TONGUE AT ONSET OF MIGRAINE HEADACHE. DO NOT TAKE MORE THAN ONE TABLET IN 24 HOURS., Disp: , Rfl:     sildenafiL (VIAGRA) 100 MG tablet, Take 100 mg by mouth., Disp: , Rfl:     sulfacetamide sodium-sulfur 10-5 % (w/w) Clsr, Apply topically., Disp: , Rfl:     valsartan (DIOVAN) 40 MG tablet, Take 1 tablet (40 mg total) by mouth 2 (two) times daily., Disp: 180 tablet, Rfl: 3

## 2025-03-26 ENCOUNTER — TELEPHONE (OUTPATIENT)
Dept: PRIMARY CARE CLINIC | Facility: CLINIC | Age: 40
End: 2025-03-26
Payer: OTHER GOVERNMENT

## 2025-03-26 NOTE — TELEPHONE ENCOUNTER
"----- Message from Joshua Dodson MD sent at 3/26/2025 12:46 PM CDT -----  Regarding: FW: Kurt  I do not know what "coded for approved" means.Given patient's history, I did order another echocardiogram.  If it is being denied or not covered--the diagnosis code placed by me for the given procedure is correct in my opinion--then he will have to see Cardiology to see if they can get it approved  ----- Message -----  From: Brittni Denson  Sent: 3/26/2025  12:01 PM CDT  To: [facility] Staff  Subject: Kurt                                            Who called:Christianne is the request in detail: Patient echo is still pending and wants it coded for approved so it can be scheduled patient wants someone to call himCan the clinic reply by [facility]? NoWould the patient rather a call back or a response via [facility]? [phone] Information:  "

## 2025-04-14 ENCOUNTER — HOSPITAL ENCOUNTER (OUTPATIENT)
Dept: CARDIOLOGY | Facility: OTHER | Age: 40
Discharge: HOME | End: 2025-04-14
Attending: FAMILY MEDICINE
Payer: OTHER GOVERNMENT

## 2025-04-14 VITALS
WEIGHT: 213 LBS | DIASTOLIC BLOOD PRESSURE: 72 MMHG | HEART RATE: 74 BPM | SYSTOLIC BLOOD PRESSURE: 128 MMHG | BODY MASS INDEX: 27.34 KG/M2 | HEIGHT: 74 IN

## 2025-04-14 DIAGNOSIS — I42.2 HYPERTROPHIC CARDIOMYOPATHY: ICD-10-CM

## 2025-04-14 LAB
AV INDEX (PROSTH): 0.95
AV MEAN GRADIENT: 6 MMHG
AV PEAK GRADIENT: 9 MMHG
AV VALVE AREA BY VELOCITY RATIO: 3.2 CM²
AV VALVE AREA: 3.3 CM²
AV VELOCITY RATIO: 0.93
BSA FOR ECHO PROCEDURE: 2.25 M2
CV ECHO LV RWT: 0.48 CM
DOP CALC AO PEAK VEL: 1.5 M/S
DOP CALC AO VTI: 29.7 CM
DOP CALC LVOT AREA: 3.5 CM2
DOP CALC LVOT DIAMETER: 2.1 CM
DOP CALC LVOT PEAK VEL: 1.4 M/S
DOP CALC LVOT STROKE VOLUME: 98 CM3
DOP CALCLVOT PEAK VEL VTI: 28.3 CM
E WAVE DECELERATION TIME: 240 MSEC
E/A RATIO: 1.4
E/E' RATIO: 7 M/S
ECHO LV POSTERIOR WALL: 1 CM (ref 0.6–1.1)
FRACTIONAL SHORTENING: 38.1 % (ref 28–44)
INTERVENTRICULAR SEPTUM: 1 CM (ref 0.6–1.1)
IVC DIAMETER: 1.14 CM
IVRT: 80 MSEC
LA MAJOR: 5.6 CM
LA MINOR: 4.6 CM
LA WIDTH: 3.6 CM
LEFT ATRIUM SIZE: 4 CM
LEFT ATRIUM VOLUME INDEX: 28 ML/M2
LEFT ATRIUM VOLUME: 62 CM3
LEFT INTERNAL DIMENSION IN SYSTOLE: 2.6 CM (ref 2.1–4)
LEFT VENTRICLE DIASTOLIC VOLUME INDEX: 35.43 ML/M2
LEFT VENTRICLE DIASTOLIC VOLUME: 79 ML
LEFT VENTRICLE MASS INDEX: 61.5 G/M2
LEFT VENTRICLE SYSTOLIC VOLUME INDEX: 10.8 ML/M2
LEFT VENTRICLE SYSTOLIC VOLUME: 24 ML
LEFT VENTRICULAR INTERNAL DIMENSION IN DIASTOLE: 4.2 CM (ref 3.5–6)
LEFT VENTRICULAR MASS: 137.2 G
LV LATERAL E/E' RATIO: 5.5 M/S
LV SEPTAL E/E' RATIO: 11 M/S
LVED V (TEICH): 78.52 ML
LVES V (TEICH): 24.03 ML
LVOT MG: 5.2 MMHG
LVOT MV: 1.09 CM/S
MV PEAK A VEL: 0.47 M/S
MV PEAK E VEL: 0.66 M/S
MV STENOSIS PRESSURE HALF TIME: 69.71 MS
MV VALVE AREA P 1/2 METHOD: 3.16 CM2
PISA MRMAX VEL: 3.17 M/S
PISA TR MAX VEL: 2.5 M/S
PULM VEIN S/D RATIO: 1.83
PV PEAK D VEL: 0.23 M/S
PV PEAK GRADIENT: 7 MMHG
PV PEAK S VEL: 0.42 M/S
PV PEAK VELOCITY: 1.28 M/S
RA MAJOR: 5.19 CM
RA PRESSURE ESTIMATED: 3 MMHG
RA WIDTH: 3.7 CM
RV TB RVSP: 6 MMHG
TDI LATERAL: 0.12 M/S
TDI SEPTAL: 0.06 M/S
TDI: 0.09 M/S
TR MAX PG: 24 MMHG
TV REST PULMONARY ARTERY PRESSURE: 28 MMHG
Z-SCORE OF LEFT VENTRICULAR DIMENSION IN END DIASTOLE: -6.29
Z-SCORE OF LEFT VENTRICULAR DIMENSION IN END SYSTOLE: -4.81

## 2025-04-14 PROCEDURE — 93306 TTE W/DOPPLER COMPLETE: CPT

## 2025-04-14 PROCEDURE — 93306 TTE W/DOPPLER COMPLETE: CPT | Mod: 26,,, | Performed by: INTERNAL MEDICINE

## 2025-08-05 ENCOUNTER — OFFICE VISIT (OUTPATIENT)
Dept: URGENT CARE | Facility: CLINIC | Age: 40
End: 2025-08-05
Payer: COMMERCIAL

## 2025-08-05 DIAGNOSIS — Z02.6 ENCOUNTER RELATED TO WORKER'S COMPENSATION CLAIM: ICD-10-CM

## 2025-08-05 DIAGNOSIS — S63.286A DISLOCATION OF PROXIMAL INTERPHALANGEAL JOINT OF RIGHT LITTLE FINGER, INITIAL ENCOUNTER: Primary | ICD-10-CM

## 2025-08-05 NOTE — LETTER
[facility] [address] [address]  Phone: [phone]  Fax: [phone]  [facility]: [email or web address]    Pt Name: Kurt Shelley  Injury Date: 08/02/2025   Employee ID:  Date of First Treatment: 08/05/2025   Company: [email or web address]      Appointment Time: 03:20 PM Arrived: 3:35 pm   Provider: Angelo Houser MD Time Out:4:35 pm      Office Treatment:   1. Dislocation of proximal interphalangeal joint of right little finger, initial encounter    2. Encounter related to worker's compensation claim          Patient Instructions: Attention not to aggravate affected area, Use splint as directed    Work Modifications: Return to Regular Duty Immediately     Return Appointment: 8/26/2025 at 2:00 pm [place]

## 2025-08-05 NOTE — PROGRESS NOTES
Subjective:      Patient ID: Kurt Shelley is a 39 y.o. male.    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  He states it is still swollen and slightly off alignment.  Physical exam shows swelling bruising very minimal tenderness to palpation at the PIP joint.  Consistent with reduced joint.  Will get x-ray to rule in or out postreduction fracture and encouraged to rest, ice, elevate, wear splint.  Will have him follow up several weeks from now for repeat x-rays and with minimal pain and normal range of motion and strength, will be allowed to work regular duty without restrictions.  Prescribed ibuprofen 600 mg if needed for pain and inflammation.  Return to clinic in 3 weeks    Hand Injury   His dominant hand is their right hand. The incident occurred 2 days ago. The incident occurred at work. The pain is present in the right hand. The quality of the pain is described as aching. The pain radiates to the right hand. The pain is at a severity of 2/10. The pain is moderate. Pertinent negatives include no chest pain or numbness. Nothing aggravates the symptoms. He has tried nothing for the symptoms. The treatment provided no relief.     ros  Constitution: Negative for chills and fever.   HENT:  Negative for postnasal drip, sinus pain and sore throat.    Neck: Negative for neck pain and neck stiffness.   Cardiovascular:  Negative for chest pain and  palpitations.   Respiratory:  Negative for cough and shortness of breath.    Genitourinary:  Negative for dysuria and hematuria.   Musculoskeletal:  Positive for trauma, joint swelling and abnormal ROM of joint. Negative for pain and joint pain.   Skin:  Negative for wound and laceration.   Neurological:  Negative for altered mental status, numbness and tingling.   Psychiatric/Behavioral:  Negative for altered mental status.      Objective:     Physical Exam  Vitals and nursing note reviewed.   Constitutional:       General: He is not in acute distress.     Appearance: Normal appearance. He is well-developed. He is not ill-appearing, toxic-appearing or diaphoretic.   HENT:      Head: Normocephalic and atraumatic.      Jaw: No trismus.      Right Ear: Hearing, tympanic membrane, ear canal and external ear normal.      Left Ear: Hearing, tympanic membrane, ear canal and external ear normal.      Nose: Nose normal. No nasal deformity, mucosal edema or rhinorrhea.      Right Sinus: No maxillary sinus tenderness or frontal sinus tenderness.      Left Sinus: No maxillary sinus tenderness or frontal sinus tenderness.      Mouth/Throat:      Dentition: Normal dentition.      Pharynx: Uvula midline. No posterior oropharyngeal erythema or uvula swelling.   Eyes:      General: Lids are normal. No scleral icterus.        Right eye: No discharge.         Left eye: No discharge.      Conjunctiva/sclera: Conjunctivae normal.      Comments: Sclera clear bilat   Neck:      Trachea: Trachea and phonation normal.   Cardiovascular:      Rate and Rhythm: Normal rate and regular rhythm.      Pulses: Normal pulses.      Heart sounds: Normal heart sounds.   Pulmonary:      Effort: Pulmonary effort is normal. No respiratory distress.      Breath sounds: Normal breath sounds.   Abdominal:      General: Bowel sounds are normal. There is no distension.      Palpations: Abdomen is soft. There is no mass or pulsatile mass.      Tenderness: There  is no abdominal tenderness.   Musculoskeletal:         General: Swelling, tenderness and signs of injury present. No deformity.      Cervical back: Full passive range of motion without pain, normal range of motion and neck supple.      Comments: Patient reports reducing the right 5th digit at the PIP joint when it occurred in the field.  Physical exam today shows mild ecchymosis, mild swelling, mild ulnar deviation at the PIP.  No pain on palpation and range of motion.  Distally neurovascularly intact.   Skin:     General: Skin is warm and dry.      Coloration: Skin is not pale.   Neurological:      Mental Status: He is alert and oriented to person, place, and time.      Motor: No abnormal muscle tone.      Coordination: Coordination normal.   Psychiatric:         Speech: Speech normal.         Behavior: Behavior normal. Behavior is cooperative.         Thought Content: Thought content normal.         Judgment: Judgment normal.       X-ray shows normal alignment without any obvious fracture  Assessment:      1. Dislocation of proximal interphalangeal joint of right little finger, initial encounter    2. 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  He states it is still swollen and slightly off alignment.  Physical exam shows swelling bruising very minimal tenderness to palpation at the PIP joint.  Consistent with reduced joint.  Will get x-ray to rule in or out postreduction fracture and encouraged to rest, ice, elevate, wear splint.  Will have him follow up several weeks from now for repeat x-rays and with minimal pain and normal range of motion and strength, will be allowed to work regular duty without restrictions.  Prescribed ibuprofen 600 mg if needed for pain and inflammation.   Return to clinic in 3 weeks     Patient Instructions: Attention not to aggravate affected area, Use splint as directed   Work Modifications: Return to Regular Duty Immediately  Follow up in about 3 weeks (around 8/26/2025).

## 2025-08-27 ENCOUNTER — TELEPHONE (OUTPATIENT)
Dept: URGENT CARE | Facility: CLINIC | Age: 40
End: 2025-08-27
Payer: COMMERCIAL

## 2025-08-27 ENCOUNTER — OFFICE VISIT (OUTPATIENT)
Dept: URGENT CARE | Facility: CLINIC | Age: 40
End: 2025-08-27
Payer: COMMERCIAL

## 2025-08-27 VITALS
HEART RATE: 68 BPM | DIASTOLIC BLOOD PRESSURE: 85 MMHG | RESPIRATION RATE: 18 BRPM | SYSTOLIC BLOOD PRESSURE: 132 MMHG | WEIGHT: 213 LBS | BODY MASS INDEX: 27.34 KG/M2 | HEIGHT: 74 IN | OXYGEN SATURATION: 98 % | TEMPERATURE: 98 F

## 2025-08-27 DIAGNOSIS — S63.286D DISLOCATION OF PROXIMAL INTERPHALANGEAL JOINT OF RIGHT LITTLE FINGER, SUBSEQUENT ENCOUNTER: Primary | ICD-10-CM

## 2025-08-27 DIAGNOSIS — Z02.6 ENCOUNTER RELATED TO WORKER'S COMPENSATION CLAIM: ICD-10-CM
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
## 2025-08-27 PROCEDURE — 99214 OFFICE O/P EST MOD 30 MIN: CPT | Mod: S$GLB,,, | Performed by: PHYSICIAN ASSISTANT

## 2025-08-27 RX ORDER — DIAZEPAM 5 MG/1
10 TABLET ORAL ONCE
Qty: 2 TABLET | Refills: 0 | Status: SHIPPED | OUTPATIENT
Start: 2025-08-27 | End: 2025-08-27

## 2025-09-02 ENCOUNTER — CLINICAL SUPPORT (OUTPATIENT)
Dept: REHABILITATION | Facility: HOSPITAL | Age: 40
End: 2025-09-02
Payer: COMMERCIAL

## 2025-09-02 ENCOUNTER — OFFICE VISIT (OUTPATIENT)
Dept: ORTHOPEDICS | Facility: CLINIC | Age: 40
End: 2025-09-02
Payer: COMMERCIAL

## 2025-09-02 ENCOUNTER — TELEPHONE (OUTPATIENT)
Dept: ORTHOPEDICS | Facility: CLINIC | Age: 40
End: 2025-09-02
Payer: COMMERCIAL

## 2025-09-02 VITALS — HEIGHT: 74 IN | WEIGHT: 210.63 LBS | BODY MASS INDEX: 27.03 KG/M2

## 2025-09-02 DIAGNOSIS — M79.644 PAIN OF FINGER OF RIGHT HAND: Primary | ICD-10-CM

## 2025-09-02 DIAGNOSIS — S63.286A CLOSED TRAUMATIC DISLOCATION OF PROXIMAL INTERPHALANGEAL (PIP) JOINT OF RIGHT LITTLE FINGER: ICD-10-CM

## 2025-09-02 DIAGNOSIS — S63.286A CLOSED TRAUMATIC DISLOCATION OF PROXIMAL INTERPHALANGEAL (PIP) JOINT OF RIGHT LITTLE FINGER: Primary | ICD-10-CM

## 2025-09-02 DIAGNOSIS — M25.641 STIFFNESS OF FINGER JOINT OF RIGHT HAND: ICD-10-CM

## 2025-09-02 PROCEDURE — 97760 ORTHOTIC MGMT&TRAING 1ST ENC: CPT | Performed by: OCCUPATIONAL THERAPIST

## 2025-09-02 PROCEDURE — L3933 FO W/O JOINTS CF: HCPCS | Performed by: OCCUPATIONAL THERAPIST
